# Patient Record
Sex: FEMALE | Race: WHITE | Employment: OTHER | URBAN - NONMETROPOLITAN AREA
[De-identification: names, ages, dates, MRNs, and addresses within clinical notes are randomized per-mention and may not be internally consistent; named-entity substitution may affect disease eponyms.]

---

## 2018-03-09 ENCOUNTER — HOSPITAL ENCOUNTER (OUTPATIENT)
Dept: WOMENS IMAGING | Age: 68
Discharge: HOME | End: 2018-03-09
Payer: COMMERCIAL

## 2018-03-09 DIAGNOSIS — Z12.31 VISIT FOR SCREENING MAMMOGRAM: ICD-10-CM

## 2018-03-09 PROCEDURE — 77063 BREAST TOMOSYNTHESIS BI: CPT

## 2018-05-10 ENCOUNTER — OFFICE VISIT (OUTPATIENT)
Dept: SURGERY | Age: 68
End: 2018-05-10
Payer: COMMERCIAL

## 2018-05-10 VITALS
SYSTOLIC BLOOD PRESSURE: 110 MMHG | RESPIRATION RATE: 20 BRPM | OXYGEN SATURATION: 97 % | HEIGHT: 64 IN | BODY MASS INDEX: 34.62 KG/M2 | TEMPERATURE: 97.5 F | HEART RATE: 64 BPM | DIASTOLIC BLOOD PRESSURE: 68 MMHG | WEIGHT: 202.8 LBS

## 2018-05-10 DIAGNOSIS — I10 ESSENTIAL HYPERTENSION: ICD-10-CM

## 2018-05-10 DIAGNOSIS — K80.50 BILIARY COLIC: Primary | ICD-10-CM

## 2018-05-10 PROCEDURE — 99204 OFFICE O/P NEW MOD 45 MIN: CPT | Performed by: SURGERY

## 2018-05-10 RX ORDER — OMEPRAZOLE 20 MG/1
20 CAPSULE, DELAYED RELEASE ORAL DAILY
COMMUNITY
End: 2019-04-15

## 2018-05-10 RX ORDER — ATORVASTATIN CALCIUM 10 MG/1
10 TABLET, FILM COATED ORAL DAILY
COMMUNITY

## 2018-05-11 ASSESSMENT — ENCOUNTER SYMPTOMS
EYE PAIN: 0
DIARRHEA: 1
APNEA: 0
RHINORRHEA: 1
NAUSEA: 1
ABDOMINAL PAIN: 1
BACK PAIN: 1
PHOTOPHOBIA: 0
COUGH: 1
CHOKING: 0

## 2018-05-14 ENCOUNTER — HOSPITAL ENCOUNTER (OUTPATIENT)
Dept: NUCLEAR MEDICINE | Age: 68
Discharge: HOME | End: 2018-05-14
Payer: COMMERCIAL

## 2018-05-14 VITALS — BODY MASS INDEX: 35.22 KG/M2 | WEIGHT: 202 LBS

## 2018-05-14 DIAGNOSIS — K80.50 BILIARY COLIC: ICD-10-CM

## 2018-05-14 PROCEDURE — A9537 TC99M MEBROFENIN: HCPCS | Performed by: SURGERY

## 2018-05-14 PROCEDURE — 6360000004 HC RX CONTRAST MEDICATION: Performed by: RADIOLOGY

## 2018-05-14 PROCEDURE — 78227 HEPATOBIL SYST IMAGE W/DRUG: CPT

## 2018-05-14 PROCEDURE — 3430000000 HC RX DIAGNOSTIC RADIOPHARMACEUTICAL: Performed by: SURGERY

## 2018-05-14 PROCEDURE — 2580000003 HC RX 258: Performed by: RADIOLOGY

## 2018-05-14 RX ADMIN — SODIUM CHLORIDE 1.83 MCG: 9 INJECTION, SOLUTION INTRAVENOUS at 10:47

## 2018-05-14 RX ADMIN — MEBROFENIN 8.6 MILLICURIE: 45 INJECTION, POWDER, LYOPHILIZED, FOR SOLUTION INTRAVENOUS at 09:42

## 2018-05-16 ASSESSMENT — ENCOUNTER SYMPTOMS
PHOTOPHOBIA: 0
DIARRHEA: 1
BACK PAIN: 1
APNEA: 0
CHOKING: 0
ABDOMINAL PAIN: 1
EYE PAIN: 0
NAUSEA: 1

## 2018-05-17 ENCOUNTER — OFFICE VISIT (OUTPATIENT)
Dept: SURGERY | Age: 68
End: 2018-05-17
Payer: COMMERCIAL

## 2018-05-17 VITALS
HEART RATE: 58 BPM | DIASTOLIC BLOOD PRESSURE: 76 MMHG | SYSTOLIC BLOOD PRESSURE: 120 MMHG | BODY MASS INDEX: 34.15 KG/M2 | HEIGHT: 64 IN | OXYGEN SATURATION: 97 % | WEIGHT: 200 LBS | TEMPERATURE: 97.2 F | RESPIRATION RATE: 18 BRPM

## 2018-05-17 DIAGNOSIS — E66.9 OBESITY (BMI 30-39.9): ICD-10-CM

## 2018-05-17 DIAGNOSIS — R74.8 ABNORMAL SERUM LEVEL OF LIPASE: ICD-10-CM

## 2018-05-17 DIAGNOSIS — R10.13 EPIGASTRIC PAIN: Primary | ICD-10-CM

## 2018-05-17 DIAGNOSIS — M79.7 FIBROMYALGIA: ICD-10-CM

## 2018-05-17 DIAGNOSIS — I10 ESSENTIAL HYPERTENSION: ICD-10-CM

## 2018-05-17 PROCEDURE — 99214 OFFICE O/P EST MOD 30 MIN: CPT | Performed by: SURGERY

## 2018-05-18 ASSESSMENT — ENCOUNTER SYMPTOMS
RHINORRHEA: 0
COUGH: 0

## 2018-05-29 ENCOUNTER — HOSPITAL ENCOUNTER (OUTPATIENT)
Dept: CT IMAGING | Age: 68
Discharge: HOME | End: 2018-05-29
Payer: COMMERCIAL

## 2018-05-29 ENCOUNTER — HOSPITAL ENCOUNTER (OUTPATIENT)
Age: 68
Discharge: HOME | End: 2018-05-29
Payer: COMMERCIAL

## 2018-05-29 DIAGNOSIS — R10.13 EPIGASTRIC PAIN: ICD-10-CM

## 2018-05-29 DIAGNOSIS — K80.50 BILIARY COLIC: ICD-10-CM

## 2018-05-29 DIAGNOSIS — I10 ESSENTIAL HYPERTENSION: ICD-10-CM

## 2018-05-29 LAB
ALBUMIN SERPL-MCNC: 4.2 G/DL (ref 3.5–5.1)
ALP BLD-CCNC: 112 U/L (ref 38–126)
ALT SERPL-CCNC: 14 U/L (ref 11–66)
ANION GAP SERPL CALCULATED.3IONS-SCNC: 10 MEQ/L (ref 8–16)
ANISOCYTOSIS: ABNORMAL
AST SERPL-CCNC: 17 U/L (ref 5–40)
BASOPHILS # BLD: 0.4 %
BASOPHILS ABSOLUTE: 0 THOU/MM3 (ref 0–0.1)
BILIRUB SERPL-MCNC: 0.4 MG/DL (ref 0.3–1.2)
BILIRUBIN DIRECT: < 0.2 MG/DL (ref 0–0.3)
BUN BLDV-MCNC: 18 MG/DL (ref 7–22)
CALCIUM SERPL-MCNC: 9.5 MG/DL (ref 8.5–10.5)
CHLORIDE BLD-SCNC: 104 MEQ/L (ref 98–111)
CO2: 30 MEQ/L (ref 23–33)
CREAT SERPL-MCNC: 0.8 MG/DL (ref 0.4–1.2)
EOSINOPHIL # BLD: 2 %
EOSINOPHILS ABSOLUTE: 0.1 THOU/MM3 (ref 0–0.4)
GFR SERPL CREATININE-BSD FRML MDRD: 71 ML/MIN/1.73M2
GLUCOSE BLD-MCNC: 116 MG/DL (ref 70–108)
HCT VFR BLD CALC: 39 % (ref 37–47)
HEMOGLOBIN: 13.2 GM/DL (ref 12–16)
LIPASE: 90.6 U/L (ref 5.6–51.3)
LYMPHOCYTES # BLD: 30.1 %
LYMPHOCYTES ABSOLUTE: 1.6 THOU/MM3 (ref 1–4.8)
MCH RBC QN AUTO: 28.9 PG (ref 27–31)
MCHC RBC AUTO-ENTMCNC: 34 GM/DL (ref 33–37)
MCV RBC AUTO: 85.1 FL (ref 81–99)
MONOCYTES # BLD: 8.5 %
MONOCYTES ABSOLUTE: 0.5 THOU/MM3 (ref 0.4–1.3)
NUCLEATED RED BLOOD CELLS: 0 /100 WBC
PDW BLD-RTO: 14.7 % (ref 11.5–14.5)
PLATELET # BLD: 171 THOU/MM3 (ref 130–400)
PMV BLD AUTO: 9.4 FL (ref 7.4–10.4)
POC CREATININE WHOLE BLOOD: 0.8 MG/DL (ref 0.5–1.2)
POTASSIUM SERPL-SCNC: 4.6 MEQ/L (ref 3.5–5.2)
RBC # BLD: 4.59 MILL/MM3 (ref 4.2–5.4)
SEG NEUTROPHILS: 59 %
SEGMENTED NEUTROPHILS ABSOLUTE COUNT: 3.2 THOU/MM3 (ref 1.8–7.7)
SODIUM BLD-SCNC: 144 MEQ/L (ref 135–145)
TOTAL PROTEIN: 6.9 G/DL (ref 6.1–8)
WBC # BLD: 5.4 THOU/MM3 (ref 4.8–10.8)

## 2018-05-29 PROCEDURE — 36415 COLL VENOUS BLD VENIPUNCTURE: CPT

## 2018-05-29 PROCEDURE — 6360000004 HC RX CONTRAST MEDICATION: Performed by: SURGERY

## 2018-05-29 PROCEDURE — 82565 ASSAY OF CREATININE: CPT

## 2018-05-29 PROCEDURE — 80053 COMPREHEN METABOLIC PANEL: CPT

## 2018-05-29 PROCEDURE — 83690 ASSAY OF LIPASE: CPT

## 2018-05-29 PROCEDURE — 82248 BILIRUBIN DIRECT: CPT

## 2018-05-29 PROCEDURE — 74177 CT ABD & PELVIS W/CONTRAST: CPT

## 2018-05-29 PROCEDURE — 85025 COMPLETE CBC W/AUTO DIFF WBC: CPT

## 2018-05-29 RX ADMIN — IOPAMIDOL 85 ML: 755 INJECTION, SOLUTION INTRAVENOUS at 13:50

## 2018-05-29 RX ADMIN — IOHEXOL 50 ML: 240 INJECTION, SOLUTION INTRATHECAL; INTRAVASCULAR; INTRAVENOUS; ORAL at 13:51

## 2018-05-31 ENCOUNTER — OFFICE VISIT (OUTPATIENT)
Dept: SURGERY | Age: 68
End: 2018-05-31
Payer: COMMERCIAL

## 2018-05-31 VITALS
HEART RATE: 54 BPM | DIASTOLIC BLOOD PRESSURE: 72 MMHG | RESPIRATION RATE: 18 BRPM | WEIGHT: 203.2 LBS | HEIGHT: 63 IN | SYSTOLIC BLOOD PRESSURE: 130 MMHG | OXYGEN SATURATION: 96 % | BODY MASS INDEX: 36 KG/M2 | TEMPERATURE: 98.3 F

## 2018-05-31 DIAGNOSIS — R74.8 ABNORMAL SERUM LEVEL OF LIPASE: ICD-10-CM

## 2018-05-31 DIAGNOSIS — R10.13 EPIGASTRIC PAIN: Primary | ICD-10-CM

## 2018-05-31 DIAGNOSIS — M79.7 FIBROMYALGIA: ICD-10-CM

## 2018-05-31 DIAGNOSIS — E66.9 OBESITY (BMI 30-39.9): ICD-10-CM

## 2018-05-31 DIAGNOSIS — I10 ESSENTIAL HYPERTENSION: ICD-10-CM

## 2018-05-31 PROCEDURE — 99213 OFFICE O/P EST LOW 20 MIN: CPT | Performed by: SURGERY

## 2018-05-31 ASSESSMENT — ENCOUNTER SYMPTOMS
CHOKING: 0
APNEA: 0
EYE PAIN: 0
NAUSEA: 1
BACK PAIN: 1
RHINORRHEA: 0
DIARRHEA: 1
COUGH: 0
PHOTOPHOBIA: 0
ABDOMINAL PAIN: 1

## 2018-06-01 ASSESSMENT — ENCOUNTER SYMPTOMS: ABDOMINAL DISTENTION: 0

## 2018-07-31 ENCOUNTER — HOSPITAL ENCOUNTER (EMERGENCY)
Age: 68
Discharge: HOME | End: 2018-07-31
Payer: COMMERCIAL

## 2018-07-31 VITALS
HEIGHT: 64 IN | WEIGHT: 202.4 LBS | HEART RATE: 58 BPM | OXYGEN SATURATION: 98 % | DIASTOLIC BLOOD PRESSURE: 72 MMHG | TEMPERATURE: 98.7 F | RESPIRATION RATE: 18 BRPM | SYSTOLIC BLOOD PRESSURE: 157 MMHG | BODY MASS INDEX: 34.56 KG/M2

## 2018-07-31 DIAGNOSIS — L25.5 CONTACT DERMATITIS DUE TO PLANTS, EXCEPT FOOD, UNSPECIFIED CONTACT DERMATITIS TYPE: Primary | ICD-10-CM

## 2018-07-31 PROCEDURE — 99213 OFFICE O/P EST LOW 20 MIN: CPT | Performed by: NURSE PRACTITIONER

## 2018-07-31 PROCEDURE — 99213 OFFICE O/P EST LOW 20 MIN: CPT

## 2018-07-31 RX ORDER — DIAPER,BRIEF,INFANT-TODD,DISP
EACH MISCELLANEOUS 2 TIMES DAILY
COMMUNITY
End: 2019-07-18

## 2018-07-31 RX ORDER — PREDNISONE 10 MG/1
TABLET ORAL
Qty: 30 TABLET | Refills: 0 | Status: SHIPPED | OUTPATIENT
Start: 2018-07-31 | End: 2018-12-10 | Stop reason: ALTCHOICE

## 2018-07-31 ASSESSMENT — ENCOUNTER SYMPTOMS
SHORTNESS OF BREATH: 0
VOMITING: 0
DIARRHEA: 0
ABDOMINAL PAIN: 0
NAUSEA: 0
CHEST TIGHTNESS: 0

## 2018-07-31 NOTE — ED PROVIDER NOTES
[facility]  Urgent Care Encounter      CHIEF COMPLAINT       Chief Complaint   Patient presents with    Rash     bilateral arms after working out in the yard       Nurses Notes reviewed and I agree except as noted in the HPI. HISTORY OF PRESENT ILLNESS   Chino Guerra is a 69 y.o. female who presents for evaluation of a rash that she noticed Thursday to her bilateral arms after working out in the yard. She thinks that it is poison ivy. She has been using Cortisone 10 to the areas with no relief. REVIEW OF SYSTEMS     Review of Systems   Constitutional: Negative. Respiratory: Negative for chest tightness and shortness of breath. Cardiovascular: Negative for chest pain. Gastrointestinal: Negative for abdominal pain, diarrhea, nausea and vomiting. Skin: Positive for rash. Allergic/Immunologic: Negative for environmental allergies and food allergies. Neurological: Negative for headaches. PAST MEDICAL HISTORY         Diagnosis Date    Arthritis     Asthma     seasonal    Depression     Fibromyalgia     GERD (gastroesophageal reflux disease)     Hx of blood clots 2003    DVT after arthroscopy right knee    Hyperlipidemia     Hypertension     Hypothyroidism     Restless legs syndrome     Type II or unspecified type diabetes mellitus without mention of complication, not stated as uncontrolled     Unspecified sleep apnea     no CPAP       SURGICAL HISTORY     Patient  has a past surgical history that includes sinus surgery (1994); Tonsillectomy (1955); Hysterectomy (1992); Knee arthroscopy (Right, 2003); Vena Cava Filter Placement (2003); Cardiac catheterization (2006?? ); Breast lumpectomy (Right, 2009? ??); Colonoscopy; joint replacement (Right, 2013); eye surgery (feb. 2016); skin biopsy; Upper gastrointestinal endoscopy (07/25/2017); and Colonoscopy (08/11/2017).     CURRENT MEDICATIONS       Previous Medications    ATENOLOL (TENORMIN) 25 MG TABLET    Take 25 mg by mouth daily. ATORVASTATIN (LIPITOR) 10 MG TABLET    Take 10 mg by mouth daily    GLIMEPIRIDE (AMARYL) 1 MG TABLET    Take 1 mg by mouth every morning (before breakfast). HYDROCORTISONE 1 % CREAM    Apply topically 2 times daily Apply topically 2 times daily. LEVOTHYROXINE (LEVOTHROID) 25 MCG TABLET    Take 25 mcg by mouth Daily    LISINOPRIL (PRINIVIL;ZESTRIL) 10 MG TABLET    Take 10 mg by mouth daily. OMEPRAZOLE (PRILOSEC) 20 MG DELAYED RELEASE CAPSULE    Take 20 mg by mouth Daily    SITAGLIPTAN (JANUVIA) 100 MG TABLET    Take 100 mg by mouth daily. VENLAFAXINE (EFFEXOR XR) 150 MG EXTENDED RELEASE CAPSULE    Take 150 mg by mouth daily       ALLERGIES     Patient is is allergic to cephalosporins and morphine. FAMILY HISTORY     Patient's family history includes Arthritis in her mother; Cancer in her mother; Diabetes in her mother; Heart Disease in her father; High Blood Pressure in her father; Other in her sister; Thyroid Cancer in her sister. SOCIAL HISTORY     Patient  reports that she has never smoked. She has never used smokeless tobacco. She reports that she does not drink alcohol or use drugs. PHYSICAL EXAM     ED TRIAGE VITALS  BP: (!) 157/72, Temp: 98.7 °F (37.1 °C), Pulse: 58, Resp: 18, SpO2: 98 %  Physical Exam   Constitutional: She is oriented to person, place, and time. Vital signs are normal. She appears well-developed and well-nourished. She is cooperative. She does not appear ill. No distress. HENT:   Head: Normocephalic and atraumatic. Right Ear: External ear normal.   Left Ear: External ear normal.   Nose: Nose normal.   Mouth/Throat: Mucous membranes are normal.   Eyes: Conjunctivae are normal.   Neck: Normal range of motion and full passive range of motion without pain. No neck rigidity. Cardiovascular: Normal rate. Pulmonary/Chest: Effort normal. No accessory muscle usage. No respiratory distress.    Neurological: She is alert and oriented to person, place, and [address] [phone]    As needed, If symptoms worsen, GO DIRECTLY TO THE EMERGENCY DEPARTMENT    DISCHARGE MEDICATIONS:  New Prescriptions    PREDNISONE (DELTASONE) 10 MG TABLET    Take 5 tabs daily AM x 2d, take 4 tabs daily AM x 2d, take 3 tabs daily AM x 2d, take 2 tabs daily AM x 2d, take 1 tab daily AM x 2.      Current Discharge Medication List          Adalberto Zuniga, [address], APRN - CNP  07/31/18 9178

## 2018-07-31 NOTE — ED TRIAGE NOTES
Pt to room 2 with her  and c/o a red itchy rash that appeared on her lower left arm on Friday after working in her yard. She states that is seems to be spreading to her right lower arm now and it is keeping her up at night because of the itching.

## 2018-12-10 ENCOUNTER — HOSPITAL ENCOUNTER (EMERGENCY)
Age: 68
Discharge: HOME | End: 2018-12-10
Payer: MEDICARE

## 2018-12-10 VITALS
RESPIRATION RATE: 18 BRPM | WEIGHT: 200 LBS | OXYGEN SATURATION: 97 % | HEIGHT: 63 IN | SYSTOLIC BLOOD PRESSURE: 138 MMHG | TEMPERATURE: 97.3 F | BODY MASS INDEX: 35.44 KG/M2 | HEART RATE: 56 BPM | DIASTOLIC BLOOD PRESSURE: 63 MMHG

## 2018-12-10 DIAGNOSIS — M79.621 PAIN IN RIGHT UPPER ARM: Primary | ICD-10-CM

## 2018-12-10 PROCEDURE — 99212 OFFICE O/P EST SF 10 MIN: CPT

## 2018-12-10 PROCEDURE — 99213 OFFICE O/P EST LOW 20 MIN: CPT | Performed by: NURSE PRACTITIONER

## 2018-12-10 RX ORDER — METHYLPREDNISOLONE 4 MG/1
TABLET ORAL
Qty: 1 KIT | Refills: 0 | Status: SHIPPED | OUTPATIENT
Start: 2018-12-10 | End: 2018-12-16

## 2018-12-10 ASSESSMENT — ENCOUNTER SYMPTOMS
DIARRHEA: 0
CHEST TIGHTNESS: 0
ABDOMINAL PAIN: 0
VOMITING: 0
SHORTNESS OF BREATH: 0
NAUSEA: 0

## 2018-12-10 ASSESSMENT — PAIN DESCRIPTION - PAIN TYPE: TYPE: ACUTE PAIN

## 2018-12-10 ASSESSMENT — PAIN DESCRIPTION - DESCRIPTORS: DESCRIPTORS: ACHING

## 2018-12-10 ASSESSMENT — PAIN DESCRIPTION - LOCATION: LOCATION: ARM

## 2018-12-10 ASSESSMENT — PAIN SCALES - GENERAL: PAINLEVEL_OUTOF10: 9

## 2018-12-10 ASSESSMENT — PAIN DESCRIPTION - FREQUENCY: FREQUENCY: INTERMITTENT

## 2018-12-10 NOTE — ED PROVIDER NOTES
Emilee Nieto [facility] URGENT CARE  [address] Encounter      [address] [facility]       Chief Complaint   Patient presents with    Bursitis     right arm       Nurses Notes reviewed and I agree except as noted in the HPI. HISTORY OF PRESENT ILLNESS   Fernando Chisholm is a 77 y.o. female who presents for evaluation of right upper arm pain for 6 weeks. She describes the pain as a deep muscle ache. She reports that her symptoms began when she started she has been carrying her 15 lb female dog around during this time due to injury inability and inability to walk. She denies any elbow or shoulder pain. She denies any radiation of pain, numbness, tingling. She denies injury or trauma to the site. She is right-hand dominant. She has been applying lidocaine patch left over from her husbands injury. It seems to help a little. REVIEW OF SYSTEMS     Review of Systems   Constitutional: Negative. Respiratory: Negative for chest tightness and shortness of breath. Cardiovascular: Negative for chest pain. Gastrointestinal: Negative for abdominal pain, diarrhea, nausea and vomiting. Musculoskeletal: Positive for myalgias. Skin: Negative for rash. Allergic/Immunologic: Negative for environmental allergies and food allergies. Neurological: Negative for headaches. PAST MEDICAL HISTORY         Diagnosis Date    Arthritis     Asthma     seasonal    Depression     Fibromyalgia     GERD (gastroesophageal reflux disease)     Hx of blood clots 2003    DVT after arthroscopy right knee    Hyperlipidemia     Hypertension     Hypothyroidism     Restless legs syndrome     Type II or unspecified type diabetes mellitus without mention of complication, not stated as uncontrolled     Unspecified sleep apnea     no CPAP       SURGICAL HISTORY     Patient  has a past surgical history that includes sinus surgery (1994); Tonsillectomy (1955); Hysterectomy (1992); Knee arthroscopy (Right, 2003);  Vena Cava Filter Placement (2003); Cardiac catheterization (2006?? ); Breast lumpectomy (Right, 2009? ??); Colonoscopy; joint replacement (Right, 2013); eye surgery (feb. 2016); skin biopsy; Upper gastrointestinal endoscopy (07/25/2017); and Colonoscopy (08/11/2017). CURRENT MEDICATIONS       Discharge Medication List as of 12/10/2018  9:41 AM      CONTINUE these medications which have NOT CHANGED    Details   hydrocortisone 1 % cream Apply topically 2 times daily Apply topically 2 times daily. , Topical, 2 TIMES DAILY, Historical Med      omeprazole (PRILOSEC) 20 MG delayed release capsule Take 20 mg by mouth DailyHistorical Med      atorvastatin (LIPITOR) 10 MG tablet Take 10 mg by mouth dailyHistorical Med      venlafaxine (EFFEXOR XR) 150 MG extended release capsule Take 150 mg by mouth dailyHistorical Med      levothyroxine (LEVOTHROID) 25 MCG tablet Take 25 mcg by mouth DailyHistorical Med      sitaGLIPtan (JANUVIA) 100 MG tablet Take 100 mg by mouth daily. lisinopril (PRINIVIL;ZESTRIL) 10 MG tablet Take 10 mg by mouth daily. atenolol (TENORMIN) 25 MG tablet Take 25 mg by mouth daily. glimepiride (AMARYL) 1 MG tablet Take 1 mg by mouth every morning (before breakfast). ALLERGIES     Patient is is allergic to cephalosporins and morphine. FAMILY HISTORY     Patient'sfamily history includes Arthritis in her mother; Cancer in her mother; Diabetes in her mother; Heart Disease in her father; High Blood Pressure in her father; Other in her sister; Thyroid Cancer in her sister. SOCIAL HISTORY     Patient  reports that she has never smoked. She has never used smokeless tobacco. She reports that she does not drink alcohol or use drugs. PHYSICAL EXAM     ED TRIAGE VITALS  BP: 138/63, Temp: 97.3 °F (36.3 °C), Pulse: 56, Resp: 18, SpO2: 97 %  Physical Exam   Constitutional: She is oriented to person, place, and time. Vital signs are normal. She appears well-developed and well-nourished.  She is Berto Sims, [address] [place] [address]  [phone]    Schedule an appointment as soon as possible for a visit in 3 days  for further evalation, If symptoms worsen, GO DIRECTLY TO [address] [place] [address] Urgent Care  [address] [place] [address] Drive  [phone]    As needed, If symptoms worsen, GO DIRECTLY TO THE EMERGENCY DEPARTMENT    DISCHARGE MEDICATIONS:  Discharge Medication List as of 12/10/2018  9:41 AM      START taking these medications    Details   methylPREDNISolone (MEDROL, AKUA,) 4 MG tablet Take by mouth., Disp-1 kit, R-0Normal           Discharge Medication List as of 12/10/2018  9:41 AM          Antonieta Maya, [address], APRN - CNP  12/10/18 9669

## 2019-03-13 ENCOUNTER — HOSPITAL ENCOUNTER (OUTPATIENT)
Dept: WOMENS IMAGING | Age: 69
Discharge: HOME | End: 2019-03-13
Payer: MEDICARE

## 2019-03-13 DIAGNOSIS — Z12.39 BREAST SCREENING: ICD-10-CM

## 2019-03-13 PROCEDURE — 77063 BREAST TOMOSYNTHESIS BI: CPT

## 2019-04-15 ENCOUNTER — HOSPITAL ENCOUNTER (EMERGENCY)
Age: 69
Discharge: HOME | End: 2019-04-15
Payer: MEDICARE

## 2019-04-15 VITALS
TEMPERATURE: 98.3 F | DIASTOLIC BLOOD PRESSURE: 72 MMHG | BODY MASS INDEX: 35.61 KG/M2 | RESPIRATION RATE: 18 BRPM | SYSTOLIC BLOOD PRESSURE: 160 MMHG | OXYGEN SATURATION: 98 % | HEIGHT: 63 IN | HEART RATE: 52 BPM | WEIGHT: 201 LBS

## 2019-04-15 DIAGNOSIS — J01.10 ACUTE FRONTAL SINUSITIS, RECURRENCE NOT SPECIFIED: Primary | ICD-10-CM

## 2019-04-15 DIAGNOSIS — H61.21 IMPACTED CERUMEN OF RIGHT EAR: ICD-10-CM

## 2019-04-15 PROCEDURE — 99213 OFFICE O/P EST LOW 20 MIN: CPT | Performed by: NURSE PRACTITIONER

## 2019-04-15 PROCEDURE — 99212 OFFICE O/P EST SF 10 MIN: CPT

## 2019-04-15 RX ORDER — PANTOPRAZOLE SODIUM 40 MG/1
40 TABLET, DELAYED RELEASE ORAL DAILY
COMMUNITY
Start: 2019-03-27 | End: 2020-11-10 | Stop reason: SDUPTHER

## 2019-04-15 RX ORDER — AZELASTINE 1 MG/ML
1 SPRAY, METERED NASAL 2 TIMES DAILY
Qty: 1 BOTTLE | Refills: 0 | Status: SHIPPED | OUTPATIENT
Start: 2019-04-15 | End: 2020-11-10

## 2019-04-15 RX ORDER — DEXTROMETHORPHAN HYDROBROMIDE AND PROMETHAZINE HYDROCHLORIDE 15; 6.25 MG/5ML; MG/5ML
5 SYRUP ORAL 4 TIMES DAILY PRN
Qty: 60 ML | Refills: 0 | Status: SHIPPED | OUTPATIENT
Start: 2019-04-15 | End: 2019-04-20

## 2019-04-15 RX ORDER — AMOXICILLIN AND CLAVULANATE POTASSIUM 875; 125 MG/1; MG/1
1 TABLET, FILM COATED ORAL 2 TIMES DAILY
Qty: 10 TABLET | Refills: 0 | Status: SHIPPED | OUTPATIENT
Start: 2019-04-15 | End: 2019-04-20

## 2019-04-15 ASSESSMENT — ENCOUNTER SYMPTOMS
SORE THROAT: 1
ABDOMINAL PAIN: 0
VOMITING: 0
CHEST TIGHTNESS: 0
RHINORRHEA: 1
CHOKING: 0
SINUS PRESSURE: 1
SINUS PAIN: 1
DIARRHEA: 0
NAUSEA: 1
WHEEZING: 0
COUGH: 1
APNEA: 0
SWOLLEN GLANDS: 0
STRIDOR: 0
SHORTNESS OF BREATH: 0
CONSTIPATION: 0

## 2019-04-15 ASSESSMENT — PAIN SCALES - GENERAL: PAINLEVEL_OUTOF10: 1

## 2019-04-15 ASSESSMENT — PAIN DESCRIPTION - PAIN TYPE: TYPE: ACUTE PAIN

## 2019-04-15 ASSESSMENT — PAIN DESCRIPTION - LOCATION: LOCATION: HEAD

## 2019-04-15 NOTE — ED PROVIDER NOTES
[place] [address]  Urgent Care Encounter      CHIEF COMPLAINT       Chief Complaint   Patient presents with    URI    Fatigue       Nurses Notes reviewed and I agree except as noted in the HPI. HISTORY OFPRESENT ILLNESS   Vincenzo Lee is a 77 y.o. The history is provided by the patient. No  was used. URI   Presenting symptoms: cough, fatigue, rhinorrhea and sore throat    Presenting symptoms: no congestion, no ear pain, no facial pain and no fever    Severity:  Severe  Onset quality:  Gradual  Duration:  1 week  Timing:  Constant  Progression:  Worsening  Chronicity:  New  Relieved by:  Nothing  Worsened by:  Certain positions and movement  Ineffective treatments:  OTC medications  Associated symptoms: headaches, sinus pain and sneezing    Associated symptoms: no arthralgias, no myalgias, no neck pain, no swollen glands and no wheezing    Risk factors: diabetes mellitus and sick contacts    Risk factors: not elderly, no chronic cardiac disease, no chronic kidney disease, no chronic respiratory disease, no immunosuppression, no recent illness and no recent travel        REVIEW OF SYSTEMS     Review of Systems   Constitutional: Positive for activity change, appetite change, diaphoresis and fatigue. Negative for chills and fever. HENT: Positive for postnasal drip, rhinorrhea, sinus pressure, sinus pain, sneezing and sore throat. Negative for congestion and ear pain. Respiratory: Positive for cough. Negative for apnea, choking, chest tightness, shortness of breath, wheezing and stridor. Cardiovascular: Negative for chest pain and leg swelling. Gastrointestinal: Positive for nausea. Negative for abdominal pain, constipation, diarrhea and vomiting. Musculoskeletal: Negative for arthralgias, myalgias and neck pain. Neurological: Positive for headaches. Negative for dizziness and light-headedness.        PAST MEDICAL HISTORY         Diagnosis Date    Arthritis     Heart Disease in her father; High Blood Pressure in her father; Other in her sister; Thyroid Cancer in her sister. SOCIAL HISTORY     Patient  reports that she has never smoked. She has never used smokeless tobacco. She reports that she does not drink alcohol or use drugs. PHYSICAL EXAM     ED TRIAGE VITALS  BP: (!) 160/72, Temp: 98.3 °F (36.8 °C), Pulse: 52, Resp: 18, SpO2: 98 %  Physical Exam   Constitutional: She is oriented to person, place, and time. Vital signs are normal. She appears well-developed and well-nourished. She is active and cooperative. Non-toxic appearance. She does not have a sickly appearance. She does not appear ill. No distress. HENT:   Head: Normocephalic and atraumatic. Right Ear: Hearing and external ear normal. A foreign body (dark dry cerumen obstructs TM) is present. Left Ear: Hearing, external ear and ear canal normal. Tympanic membrane is bulging. Nose: Mucosal edema and rhinorrhea present. Right sinus exhibits frontal sinus tenderness. Left sinus exhibits frontal sinus tenderness. Mouth/Throat: Uvula is midline, oropharynx is clear and moist and mucous membranes are normal. No tonsillar exudate. Eyes: Conjunctivae and EOM are normal.   Neck: Normal range of motion. Pulmonary/Chest: Effort normal and breath sounds normal. No stridor. No respiratory distress. She has no wheezes. She has no rales. She exhibits no tenderness. Musculoskeletal: Normal range of motion. Neurological: She is alert and oriented to person, place, and time. Skin: Skin is warm and dry. She is not diaphoretic. Psychiatric: She has a normal mood and affect. Her behavior is normal. Judgment and thought content normal.   Nursing note and vitals reviewed. DIAGNOSTIC RESULTS   Labs:No results found for this visit on 04/15/19.     IMAGING:  No orders to display     URGENT CARE COURSE:     Vitals:    04/15/19 1046   BP: (!) 160/72   Pulse: 52   Resp: 18   Temp: 98.3 °F (36.8 °C) TempSrc: Temporal   SpO2: 98%   Weight: 201 lb (91.2 kg)   Height: 5' 3\" (1.6 m)       Medications - No data to display  PROCEDURES:  None  FINAL IMPRESSION      1. Acute frontal sinusitis, recurrence not specified    2. Impacted cerumen of right ear        DISPOSITION/PLAN   Decision To Discharge    Drink lots of fluids  Take Motrin or Tylenol for headache  Humidification of air  Use nasal spray as directed  Take a nasal decongestant as directed  Monitor for any fever increased and sinus pain or pressure  Follow-up see her primary care provider in 48 hours  Or go to the emergency department for any changes or concerns.       PATIENT REFERRED TO:  Sophia March MD  [place] [address]  [phone]    Schedule an appointment as soon as possible for a visit in 1 week      DISCHARGE MEDICATIONS:  Discharge Medication List as of 4/15/2019 11:01 AM      START taking these medications    Details   amoxicillin-clavulanate (AUGMENTIN) 875-125 MG per tablet Take 1 tablet by mouth 2 times daily for 5 days, Disp-10 tablet, R-0Normal      azelastine (ASTELIN) 0.1 % nasal spray 1 spray by Nasal route 2 times daily for 14 days Use in each nostril as directed, Disp-1 Bottle, R-0Normal      promethazine-dextromethorphan (PROMETHAZINE-DM) 6.25-15 MG/5ML syrup Take 5 mLs by mouth 4 times daily as needed for Cough (may cause drowsiness), Disp-60 mL, R-0Normal           Discharge Medication List as of 4/15/2019 11:01 AM          ANA Carmen CNP, APRN - CNP  04/15/19 1108

## 2019-06-12 ENCOUNTER — HOSPITAL ENCOUNTER (EMERGENCY)
Dept: GENERAL RADIOLOGY | Age: 69
Discharge: HOME | End: 2019-06-12
Payer: MEDICARE

## 2019-06-12 ENCOUNTER — HOSPITAL ENCOUNTER (EMERGENCY)
Age: 69
Discharge: HOME | End: 2019-06-12
Payer: MEDICARE

## 2019-06-12 VITALS
SYSTOLIC BLOOD PRESSURE: 134 MMHG | BODY MASS INDEX: 33.8 KG/M2 | OXYGEN SATURATION: 97 % | HEART RATE: 50 BPM | TEMPERATURE: 97.9 F | DIASTOLIC BLOOD PRESSURE: 64 MMHG | RESPIRATION RATE: 16 BRPM | WEIGHT: 198 LBS | HEIGHT: 64 IN

## 2019-06-12 DIAGNOSIS — R11.0 NAUSEA: ICD-10-CM

## 2019-06-12 DIAGNOSIS — R19.7 ACUTE DIARRHEA: Primary | ICD-10-CM

## 2019-06-12 DIAGNOSIS — R10.13 ABDOMINAL PAIN, EPIGASTRIC: ICD-10-CM

## 2019-06-12 LAB
BASOPHILS # BLD: 0.5 %
BASOPHILS ABSOLUTE: 0 THOU/MM3 (ref 0–0.1)
BUN WHOLE BLOOD, UC: 16 MG/DL (ref 8–26)
CHLORIDE, WHOLE BLOOD: 107 MEQ/L (ref 98–109)
CO2, WHOLE BLOOD: 27 MEQ/L (ref 23–33)
CREATININE WHOLE BLOOD, UC: 0.8 MG/DL (ref 0.5–1.2)
EOSINOPHIL # BLD: 0.7 %
EOSINOPHILS ABSOLUTE: 0 THOU/MM3 (ref 0–0.4)
GFR, ESTIMATED: 75 ML/MIN/1.73M2
GLUCOSE, WHOLE BLOOD: 73 MG/DL (ref 70–108)
HCT VFR BLD CALC: 41.6 % (ref 37–47)
HEMOGLOBIN: 13.9 GM/DL (ref 12–16)
IMMATURE GRANS (ABS): 0.01 THOU/MM3 (ref 0–0.07)
IMMATURE GRANULOCYTES: 0.2 %
LYMPHOCYTES # BLD: 27.6 %
LYMPHOCYTES ABSOLUTE: 1.5 THOU/MM3 (ref 1–4.8)
MCH RBC QN AUTO: 28.4 PG (ref 27–31)
MCHC RBC AUTO-ENTMCNC: 33.4 GM/DL (ref 33–37)
MCV RBC AUTO: 85 FL (ref 81–99)
MONOCYTES # BLD: 9.7 %
MONOCYTES ABSOLUTE: 0.5 THOU/MM3 (ref 0.4–1.3)
NUCLEATED RED BLOOD CELLS: 0 /100 WBC
PDW BLD-RTO: 12.2 % (ref 11.5–14.5)
PLATELET # BLD: 161 THOU/MM3 (ref 130–400)
PMV BLD AUTO: 11.1 FL (ref 7.4–10.4)
POTASSIUM, WHOLE BLOOD: 3.5 MEQ/L (ref 3.5–4.9)
RBC # BLD: 4.88 MILL/MM3 (ref 4.2–5.4)
SEG NEUTROPHILS: 61.3 %
SEGMENTED NEUTROPHILS ABSOLUTE COUNT: 3.4 THOU/MM3 (ref 1.8–7.7)
SODIUM, WHOLE BLOOD: 144 MEQ/L (ref 138–146)
WBC # BLD: 5.6 THOU/MM3 (ref 4.8–10.8)

## 2019-06-12 PROCEDURE — 80051 ELECTROLYTE PANEL: CPT

## 2019-06-12 PROCEDURE — 85025 COMPLETE CBC W/AUTO DIFF WBC: CPT

## 2019-06-12 PROCEDURE — 82947 ASSAY GLUCOSE BLOOD QUANT: CPT

## 2019-06-12 PROCEDURE — 84520 ASSAY OF UREA NITROGEN: CPT

## 2019-06-12 PROCEDURE — 99214 OFFICE O/P EST MOD 30 MIN: CPT

## 2019-06-12 PROCEDURE — 2709999900 HC NON-CHARGEABLE SUPPLY

## 2019-06-12 PROCEDURE — 96374 THER/PROPH/DIAG INJ IV PUSH: CPT

## 2019-06-12 PROCEDURE — 2580000003 HC RX 258: Performed by: NURSE PRACTITIONER

## 2019-06-12 PROCEDURE — 6370000000 HC RX 637 (ALT 250 FOR IP): Performed by: NURSE PRACTITIONER

## 2019-06-12 PROCEDURE — 74018 RADEX ABDOMEN 1 VIEW: CPT

## 2019-06-12 PROCEDURE — 96361 HYDRATE IV INFUSION ADD-ON: CPT

## 2019-06-12 PROCEDURE — 6360000002 HC RX W HCPCS: Performed by: NURSE PRACTITIONER

## 2019-06-12 PROCEDURE — 82565 ASSAY OF CREATININE: CPT

## 2019-06-12 PROCEDURE — 36415 COLL VENOUS BLD VENIPUNCTURE: CPT

## 2019-06-12 PROCEDURE — 99214 OFFICE O/P EST MOD 30 MIN: CPT | Performed by: NURSE PRACTITIONER

## 2019-06-12 RX ORDER — 0.9 % SODIUM CHLORIDE 0.9 %
1000 INTRAVENOUS SOLUTION INTRAVENOUS ONCE
Status: COMPLETED | OUTPATIENT
Start: 2019-06-12 | End: 2019-06-12

## 2019-06-12 RX ORDER — ONDANSETRON 2 MG/ML
4 INJECTION INTRAMUSCULAR; INTRAVENOUS ONCE
Status: COMPLETED | OUTPATIENT
Start: 2019-06-12 | End: 2019-06-12

## 2019-06-12 RX ORDER — ONDANSETRON 4 MG/1
4 TABLET, FILM COATED ORAL EVERY 8 HOURS PRN
Qty: 15 TABLET | Refills: 0 | Status: SHIPPED | OUTPATIENT
Start: 2019-06-12 | End: 2019-07-18

## 2019-06-12 RX ORDER — DICYCLOMINE HYDROCHLORIDE 10 MG/1
20 CAPSULE ORAL ONCE
Status: COMPLETED | OUTPATIENT
Start: 2019-06-12 | End: 2019-06-12

## 2019-06-12 RX ADMIN — DICYCLOMINE HYDROCHLORIDE 20 MG: 10 CAPSULE ORAL at 13:04

## 2019-06-12 RX ADMIN — SODIUM CHLORIDE 1000 ML: 9 INJECTION, SOLUTION INTRAVENOUS at 13:01

## 2019-06-12 RX ADMIN — ONDANSETRON 4 MG: 2 INJECTION INTRAMUSCULAR; INTRAVENOUS at 12:57

## 2019-06-12 RX ADMIN — LIDOCAINE HYDROCHLORIDE: 20 SOLUTION ORAL; TOPICAL at 13:06

## 2019-06-12 ASSESSMENT — ENCOUNTER SYMPTOMS
TROUBLE SWALLOWING: 0
ABDOMINAL PAIN: 1
ANAL BLEEDING: 0
CHEST TIGHTNESS: 0
COUGH: 0
RECTAL PAIN: 0
VOMITING: 0
SORE THROAT: 0
ABDOMINAL DISTENTION: 0
BLOOD IN STOOL: 0
CONSTIPATION: 0
DIARRHEA: 1
VOICE CHANGE: 0
NAUSEA: 0
SHORTNESS OF BREATH: 0

## 2019-06-12 NOTE — ED TRIAGE NOTES
Patient ambulated to room with complaint of diarrhea that started Friday. States in the beginning of symptoms she had mid abdominal pain but not recently now. States she has had several episodes of incontinence of stool.  Also complains of nausea and feeling worn out also states she is having nausea since she has walked back to room 3

## 2019-06-12 NOTE — ED NOTES
Patient states her nausea is improved and pain is improved.  IV infusing with dressing clean, dry and intact     Rubia Weaver RN  06/12/19 8436

## 2019-06-12 NOTE — ED PROVIDER NOTES
[place]  Urgent Care Encounter      Vinh Armenta       Chief Complaint   Patient presents with    Diarrhea     x1       Nurses Notes reviewed and I agree except as noted in the HPI. HISTORY OF PRESENT ILLNESS   Lexi Tomlinson is a 77 y.o. female who presents:  Patient presents to the urgent care for evaluation of epigastric pain. She states it does not radiate and she has had 2 episodes of epigastric pain since her diarrhea started on Friday. She states it is a sharp pain. Nothing makes it better or worse. She denies any fever, chills, vomiting. She did become nauseated while she presented to the urgent care today. She denies any chest pain, dyspnea, dizziness. She did develop a headache recently with this illness. She has had a EGD and a colonoscopy in the past by her GI physician she has also been diagnosed in the past with biliary colic. She has a history of hypertension and diabetes. She states her sugars have been controlled. She has a history of hysterectomy but denies any other abdominal surgeries. She denies drinking alcohol or smoking. He states at first onset on Friday the diarrhea was loose and brown and she had approximately 10 stools. She states each day the diarrhea decreases stating that she only had one episode today. She denies any diaphoresis, blood in the stool, or mucus. She has not had any recent travel, or ill exposures. Denies any suspicion for spoiled foods or recent antibiotic use. She had oatmeal today and states she had a loose stool directly after eating. She has been urinating normal and drinking a lot of water. Denies any urgency, frequency, or dysuria. She took pepto bismol on the first day and it did not help so she stopped. She is on protonix has history of GERD. Denies any dyspepsia. The history is provided by the patient.        REVIEW OF SYSTEMS     Review of Systems   Constitutional: Positive for activity change and fatigue. Negative for appetite change, chills, diaphoresis and fever. HENT: Negative for sore throat, trouble swallowing and voice change. Eyes: Negative for visual disturbance. Respiratory: Negative for cough, chest tightness and shortness of breath. Cardiovascular: Negative for chest pain. Gastrointestinal: Positive for abdominal pain and diarrhea. Negative for abdominal distention, anal bleeding, blood in stool, constipation, nausea, rectal pain and vomiting. Genitourinary: Negative for decreased urine volume, difficulty urinating, dysuria, frequency, urgency and vaginal pain. Musculoskeletal: Negative for arthralgias and myalgias. Skin: Negative for pallor and rash. Neurological: Positive for headaches. Negative for dizziness, syncope, light-headedness and numbness. Hematological: Negative for adenopathy. Does not bruise/bleed easily. Psychiatric/Behavioral: The patient is not nervous/anxious. PAST MEDICAL HISTORY         Diagnosis Date    Arthritis     Asthma     seasonal    Depression     Fibromyalgia     GERD (gastroesophageal reflux disease)     Hx of blood clots 2003    DVT after arthroscopy right knee    Hyperlipidemia     Hypertension     Hypothyroidism     Restless legs syndrome     Type II or unspecified type diabetes mellitus without mention of complication, not stated as uncontrolled     Unspecified sleep apnea     no CPAP       SURGICAL HISTORY     Patient  has a past surgical history that includes sinus surgery (1994); Tonsillectomy (1955); Hysterectomy (1992); Knee arthroscopy (Right, 2003); Vena Cava Filter Placement (2003); Cardiac catheterization (2006?? ); Breast lumpectomy (Right, 2009? ??); Colonoscopy; joint replacement (Right, 2013); eye surgery (feb. 2016); skin biopsy; Upper gastrointestinal endoscopy (07/25/2017); and Colonoscopy (08/11/2017).     CURRENT MEDICATIONS       Previous Medications    ATENOLOL (TENORMIN) 25 MG TABLET    Take 25 mg by heard.  Pulmonary/Chest: Effort normal and breath sounds normal. No tachypnea. No respiratory distress. Abdominal: Soft. Normal appearance and bowel sounds are normal. She exhibits no distension and no mass. There is no hepatosplenomegaly. There is tenderness in the epigastric area. There is no rigidity, no rebound, no guarding, no CVA tenderness and negative Munoz's sign. No hernia. Musculoskeletal: Normal range of motion. Right knee: She exhibits normal range of motion. Left knee: She exhibits normal range of motion. Lymphadenopathy:     She has no cervical adenopathy. Neurological: She is alert and oriented to person, place, and time. No sensory deficit. GCS eye subscore is 4. GCS verbal subscore is 5. GCS motor subscore is 6. No neuro focal deficits   Skin: Skin is warm, dry and intact. No rash noted. She is not diaphoretic. No cyanosis or erythema. No pallor. No obvious signs of infection or trauma. Psychiatric: She has a normal mood and affect. Her behavior is normal.   Nursing note and vitals reviewed.       DIAGNOSTIC RESULTS   Labs:  Results for orders placed or performed during the hospital encounter of 06/12/19   POC Basic Metabol Panel without Calcium   Result Value Ref Range    Sodium, Whole Blood 144 138 - 146 meq/l    Potassium, Whole Blood 3.5 3.5 - 4.9 meq/l    Chloride, Whole Blood 107 98 - 109 meq/l    CO2, WHOLE BLOOD 27 23 - 33 meq/l    Glucose, Whole Blood 73 70 - 108 mg/dl    BUN WHOLE BLOOD, UC 16 8 - 26 mg/dl    CREATININE WHOLE BLOOD, UC 0.8 0.5 - 1.2 mg/dl   Glomerular Filtration Rate, Estimated   Result Value Ref Range    GFR, Estimated 75 ml/min/1.73m2       IMAGING:    URGENT CARE COURSE:     Vitals:    06/12/19 1155 06/12/19 1403   BP: (!) 140/67 134/64   Pulse: 52 50   Resp: 16 16   Temp: 97.9 °F (36.6 °C)    TempSrc: Oral    SpO2: 97% 97%   Weight: 198 lb (89.8 kg)    Height: 5' 4\" (1.626 m)        Medications   0.9 % sodium chloride bolus (0 mLs

## 2019-06-14 ENCOUNTER — HOSPITAL ENCOUNTER (OUTPATIENT)
Age: 69
Setting detail: SPECIMEN
Discharge: HOME | End: 2019-06-14
Payer: MEDICARE

## 2019-06-14 ENCOUNTER — HOSPITAL ENCOUNTER (EMERGENCY)
Age: 69
Discharge: HOME | End: 2019-06-14
Payer: MEDICARE

## 2019-06-14 VITALS
DIASTOLIC BLOOD PRESSURE: 76 MMHG | TEMPERATURE: 98.2 F | OXYGEN SATURATION: 99 % | HEIGHT: 64 IN | RESPIRATION RATE: 13 BRPM | WEIGHT: 196 LBS | HEART RATE: 52 BPM | BODY MASS INDEX: 33.46 KG/M2 | SYSTOLIC BLOOD PRESSURE: 142 MMHG

## 2019-06-14 DIAGNOSIS — R19.7 DIARRHEA OF PRESUMED INFECTIOUS ORIGIN: Primary | ICD-10-CM

## 2019-06-14 LAB
ALBUMIN SERPL-MCNC: 3.8 G/DL (ref 3.5–5.1)
ALP BLD-CCNC: 93 U/L (ref 38–126)
ALT SERPL-CCNC: 14 U/L (ref 11–66)
ANION GAP SERPL CALCULATED.3IONS-SCNC: 14 MEQ/L (ref 8–16)
AST SERPL-CCNC: 19 U/L (ref 5–40)
BASOPHILS # BLD: 0.4 %
BASOPHILS ABSOLUTE: 0 THOU/MM3 (ref 0–0.1)
BILIRUB SERPL-MCNC: 0.4 MG/DL (ref 0.3–1.2)
BUN BLDV-MCNC: 11 MG/DL (ref 7–22)
CALCIUM SERPL-MCNC: 9.1 MG/DL (ref 8.5–10.5)
CHLORIDE BLD-SCNC: 107 MEQ/L (ref 98–111)
CO2: 21 MEQ/L (ref 23–33)
CREAT SERPL-MCNC: 0.7 MG/DL (ref 0.4–1.2)
EOSINOPHIL # BLD: 0.6 %
EOSINOPHILS ABSOLUTE: 0 THOU/MM3 (ref 0–0.4)
ERYTHROCYTE [DISTWIDTH] IN BLOOD BY AUTOMATED COUNT: 13.5 % (ref 11.5–14.5)
ERYTHROCYTE [DISTWIDTH] IN BLOOD BY AUTOMATED COUNT: 43.8 FL (ref 35–45)
GFR SERPL CREATININE-BSD FRML MDRD: 83 ML/MIN/1.73M2
GLUCOSE BLD-MCNC: 116 MG/DL (ref 70–108)
HCT VFR BLD CALC: 40.3 % (ref 37–47)
HEMOGLOBIN: 12.8 GM/DL (ref 12–16)
IMMATURE GRANS (ABS): 0.01 THOU/MM3 (ref 0–0.07)
IMMATURE GRANULOCYTES: 0.2 %
LYMPHOCYTES # BLD: 28.1 %
LYMPHOCYTES ABSOLUTE: 1.3 THOU/MM3 (ref 1–4.8)
MCH RBC QN AUTO: 28.3 PG (ref 26–33)
MCHC RBC AUTO-ENTMCNC: 31.8 GM/DL (ref 32.2–35.5)
MCV RBC AUTO: 89 FL (ref 81–99)
MONOCYTES # BLD: 10.3 %
MONOCYTES ABSOLUTE: 0.5 THOU/MM3 (ref 0.4–1.3)
NUCLEATED RED BLOOD CELLS: 0 /100 WBC
OSMOLALITY CALCULATION: 283.5 MOSMOL/KG (ref 275–300)
PLATELET # BLD: 146 THOU/MM3 (ref 130–400)
PMV BLD AUTO: 10.3 FL (ref 9.4–12.4)
POTASSIUM SERPL-SCNC: 3.9 MEQ/L (ref 3.5–5.2)
RBC # BLD: 4.53 MILL/MM3 (ref 4.2–5.4)
SEG NEUTROPHILS: 60.4 %
SEGMENTED NEUTROPHILS ABSOLUTE COUNT: 2.8 THOU/MM3 (ref 1.8–7.7)
SODIUM BLD-SCNC: 142 MEQ/L (ref 135–145)
TOTAL PROTEIN: 6.9 G/DL (ref 6.1–8)
WBC # BLD: 4.7 THOU/MM3 (ref 4.8–10.8)

## 2019-06-14 PROCEDURE — 87449 NOS EACH ORGANISM AG IA: CPT

## 2019-06-14 PROCEDURE — 80053 COMPREHEN METABOLIC PANEL: CPT

## 2019-06-14 PROCEDURE — 2709999900 HC NON-CHARGEABLE SUPPLY

## 2019-06-14 PROCEDURE — 2580000003 HC RX 258: Performed by: PHYSICIAN ASSISTANT

## 2019-06-14 PROCEDURE — 36415 COLL VENOUS BLD VENIPUNCTURE: CPT

## 2019-06-14 PROCEDURE — 87507 IADNA-DNA/RNA PROBE TQ 12-25: CPT

## 2019-06-14 PROCEDURE — 99284 EMERGENCY DEPT VISIT MOD MDM: CPT

## 2019-06-14 PROCEDURE — 85025 COMPLETE CBC W/AUTO DIFF WBC: CPT

## 2019-06-14 RX ORDER — DIPHENOXYLATE HCL/ATROPINE 2.5-.025/5
10 LIQUID (ML) ORAL 4 TIMES DAILY PRN
Qty: 120 ML | Refills: 0 | Status: SHIPPED | OUTPATIENT
Start: 2019-06-14 | End: 2019-07-14

## 2019-06-14 RX ORDER — 0.9 % SODIUM CHLORIDE 0.9 %
1000 INTRAVENOUS SOLUTION INTRAVENOUS ONCE
Status: COMPLETED | OUTPATIENT
Start: 2019-06-14 | End: 2019-06-14

## 2019-06-14 RX ADMIN — SODIUM CHLORIDE 1000 ML: 9 INJECTION, SOLUTION INTRAVENOUS at 14:00

## 2019-06-14 ASSESSMENT — ENCOUNTER SYMPTOMS
EYE DISCHARGE: 0
BLOOD IN STOOL: 0
NAUSEA: 0
WHEEZING: 0
EYE PAIN: 0
RHINORRHEA: 0
DIARRHEA: 1
BACK PAIN: 0
ABDOMINAL PAIN: 1
SORE THROAT: 0
VOMITING: 0
SHORTNESS OF BREATH: 0
COUGH: 0

## 2019-06-14 NOTE — ED PROVIDER NOTES
[facility] Emergency Department      CHIEF COMPLAINT       Chief Complaint   Patient presents with    Diarrhea       Nurses Notes reviewed and I agree except as noted in the HPI. HISTORY OF PRESENT ILLNESS    Dimitris Fernandez is a 77 y.o. female who presents to the ED with a primary complain of diarrhea. Patient has experienced this diarrhea for the past 7 days. She denies any blood in the stool. She reports 5 to 6 episodes per day. Patient was evaluated at urgent care on 06/12. She complained of intermittent abdominal pain at that time as well. Lab work and an xray were performed with no acute findings. Patient was treated with Zofran and diagnosed with viral gastroenteritis. Her symptoms have continued, prompting her visit here today. She denies nausea or vomiting. She reports decreased appetite, but has been able to keep foods/liquids down. She had this intermittent stabbing pain to the RUQ X2. She denies any pain at this time. She denies fever, chills, or recent sick contacts. She denies pain/burning with urination. Patient recently started a new Nutri system diet. Before this, she was eating mostly fast foods, specifically [employer] and [employer]. Patient has a mild headache and occasional episodes of lightheadedness. She denies vision change. She denies chest pain or shortness of breath. The patient has been diagnosed with GERD and biliary colic in the past. She has a history of abdominal surgery including a hysterectomy. Patient follows with Dr. Jimena Alva for GI. She has other history including hypertension, hyperlipidemia, diabetes, asthma, and fibromyalgia. Patient does not appear to be in any distress. There are no other complaints or symptoms. REVIEW OF SYSTEMS     Review of Systems   Constitutional: Positive for appetite change. Negative for chills, fatigue and fever. HENT: Negative for congestion, ear pain, rhinorrhea and sore throat.     Eyes: Negative for pain, discharge and visual disturbance. Respiratory: Negative for cough, shortness of breath and wheezing. Cardiovascular: Negative for chest pain, palpitations and leg swelling. Gastrointestinal: Positive for abdominal pain (X2-resolved) and diarrhea. Negative for blood in stool, nausea and vomiting. Genitourinary: Negative for difficulty urinating, dysuria, hematuria and vaginal discharge. Musculoskeletal: Negative for arthralgias, back pain, joint swelling and neck pain. Skin: Negative for pallor and rash. Neurological: Negative for dizziness, syncope, weakness, light-headedness, numbness and headaches. Hematological: Negative for adenopathy. Psychiatric/Behavioral: Negative for confusion and suicidal ideas. The patient is not nervous/anxious. PAST MEDICAL HISTORY    has a past medical history of Arthritis, Asthma, Depression, Fibromyalgia, GERD (gastroesophageal reflux disease), Hx of blood clots, Hyperlipidemia, Hypertension, Hypothyroidism, Restless legs syndrome, Type II or unspecified type diabetes mellitus without mention of complication, not stated as uncontrolled, and Unspecified sleep apnea. SURGICAL HISTORY      has a past surgical history that includes sinus surgery (1994); Tonsillectomy (1955); Hysterectomy (1992); Knee arthroscopy (Right, 2003); Vena Cava Filter Placement (2003); Cardiac catheterization (2006?? ); Breast lumpectomy (Right, 2009? ??); Colonoscopy; joint replacement (Right, 2013); eye surgery (feb. 2016); skin biopsy; Upper gastrointestinal endoscopy (07/25/2017); and Colonoscopy (08/11/2017).     CURRENT MEDICATIONS       Discharge Medication List as of 6/14/2019  3:50 PM      CONTINUE these medications which have NOT CHANGED    Details   ondansetron (ZOFRAN) 4 MG tablet Take 1 tablet by mouth every 8 hours as needed for Nausea or Vomiting, Disp-15 tablet, R-0Normal      pantoprazole (PROTONIX) 40 MG tablet Historical Med      azelastine (ASTELIN) 0.1 % nasal spray 1 spray by Nasal route 2 times daily for 14 days Use in each nostril as directed, Disp-1 Bottle, R-0Normal      hydrocortisone 1 % cream Apply topically 2 times daily Apply topically 2 times daily. , Topical, 2 TIMES DAILY, Historical Med      atorvastatin (LIPITOR) 10 MG tablet Take 10 mg by mouth dailyHistorical Med      venlafaxine (EFFEXOR XR) 150 MG extended release capsule Take 150 mg by mouth dailyHistorical Med      levothyroxine (LEVOTHROID) 25 MCG tablet Take 25 mcg by mouth DailyHistorical Med      sitaGLIPtan (JANUVIA) 100 MG tablet Take 100 mg by mouth daily. lisinopril (PRINIVIL;ZESTRIL) 10 MG tablet Take 10 mg by mouth daily. atenolol (TENORMIN) 25 MG tablet Take 25 mg by mouth daily. glimepiride (AMARYL) 1 MG tablet Take 1 mg by mouth every morning (before breakfast). is allergic to cephalosporins and morphine. FAMILY HISTORY     indicated that her mother is . She indicated that her father is . She indicated that both of her sisters are alive. She indicated that her brother is alive. family history includes Arthritis in her mother; Cancer in her mother; Diabetes in her mother; Heart Disease in her father; High Blood Pressure in her father; Other in her sister; Thyroid Cancer in her sister. SOCIAL HISTORY      reports that she has never smoked. She has never used smokeless tobacco. She reports that she does not drink alcohol or use drugs. PHYSICAL EXAM     INITIAL VITALS:  height is 5' 4\" (1.626 m) and weight is 196 lb (88.9 kg). Her oral temperature is 98.2 °F (36.8 °C). Her blood pressure is 142/76 (abnormal) and her pulse is 52. Her respiration is 13 and oxygen saturation is 99%. Physical Exam   Constitutional: She is oriented to person, place, and time. Vital signs are normal. She appears well-developed and well-nourished. Non-toxic appearance. No distress. HENT:   Head: Normocephalic and atraumatic.    Right Ear: Hearing normal.   Left Ear: Hearing normal.   Nose: Nose normal. No rhinorrhea. Mouth/Throat: Uvula is midline, oropharynx is clear and moist and mucous membranes are normal.   Eyes: Pupils are equal, round, and reactive to light. Conjunctivae and EOM are normal. No scleral icterus. Neck: No JVD present. No neck rigidity. No tracheal deviation present. Cardiovascular: Normal rate, regular rhythm and normal heart sounds. Pulmonary/Chest: Effort normal and breath sounds normal. No respiratory distress. She has no decreased breath sounds. She has no wheezes. Abdominal: Soft. Normal appearance and bowel sounds are normal. She exhibits no distension and no pulsatile midline mass. There is no tenderness. There is no rigidity, no rebound, no guarding, no CVA tenderness, no tenderness at McBurney's point and negative Munoz's sign. No hernia. Musculoskeletal: Normal range of motion. Lymphadenopathy:     She has no cervical adenopathy. Neurological: She is alert and oriented to person, place, and time. She has normal strength. Gait normal.   Skin: Skin is warm, dry and intact. No rash noted. She is not diaphoretic. No pallor. Psychiatric: She has a normal mood and affect. Her speech is normal and behavior is normal. Thought content normal.   Nursing note and vitals reviewed. DIFFERENTIAL DIAGNOSIS:   Including but not limited to Gastroenteritis, Infectious diarrhea, Dehydration    DIAGNOSTIC RESULTS     EKG: All EKG's are interpreted by the Emergency Department Physician who either signs or Co-signs this chart in the absence of a cardiologist.  None     RADIOLOGY: non-plain film images(s) such as CT, Ultrasound andMRI are read by the radiologist.  Plain radiographic images are visualized and preliminarily interpreted by the emergency physician unless otherwise stated below.     No orders to display       LABS:   Labs Reviewed   COMPREHENSIVE METABOLIC PANEL - Abnormal; Notable for the following components:       Result Value    Glucose 116 (*)     CO2 21 (*)     All other components within normal limits   CBC WITH AUTO DIFFERENTIAL - Abnormal; Notable for the following components:    WBC 4.7 (*)     MCHC 31.8 (*)     All other components within normal limits   GLOMERULAR FILTRATION RATE, ESTIMATED - Abnormal; Notable for the following components:    Est, Glom Filt Rate 83 (*)     All other components within normal limits   GASTROINTESTINAL PANEL BY DNA   ANION GAP   OSMOLALITY       EMERGENCY DEPARTMENT COURSE:   Vitals:    Vitals:    06/14/19 1243 06/14/19 1248 06/14/19 1354 06/14/19 1450   BP:  (!) 147/71 (!) 145/73 (!) 142/76   Pulse: 60  50 52   Resp: 12  12 13   Temp: 98.2 °F (36.8 °C)      TempSrc: Oral      SpO2: 99%  98% 99%   Weight: 196 lb (88.9 kg)      Height: 5' 4\" (1.626 m)        1257 Labs ordered    1343 Fluids started    1535 I offered to make the patient an appointment at the [facility] clinic or family [facility] clinic and she declined. The patient presents here with complaints of diarrhea and intermittent abdominal pain for 1 week. Patient was evaluated at urgent care two weeks ago with a negative workup including labs and abdominal xray. Symptoms have not improved, prompting her visit here today. Vital signs are within normal limits. I appreciated a normal physical exam, specifically with no abdominal tenderness. Labs were ordered and patient was given IV fluids in the meantime. Patient agreed to provide a stool sample if possible, but she was unable to do so. Labs are reassuring. I discussed results with the patient and she was offered an appointment at the [facility] clinic or with family [facility], but she declined. On re-exam the patient's abdomen is soft and non-tender, with no peritoneal signs. Vital signs have remained stable. The patient remains non-toxic appearing with no distress evident in the ER. The patient was then discharged home in stable condition with Lomotil.     I have given the patient strict written and verbal instructions about care at home, follow-up, and sign and symptoms of worsening of condition and they did verbalize understanding. CRITICAL CARE:   None    CONSULTS:  None    PROCEDURES:  None    FINALIMPRESSION      1. Diarrhea of presumed infectious origin          DISPOSITION/PLAN   Discharge      PATIENT REFERRED TO:  Miki Sun  Lane Reyesler [address]    Schedule an appointment as soon as possible for a visit       Carissa Easley MD  [address] [facility]  [address]  [phone]    Schedule an appointment as soon as possible for a visit         DISCHARGE MEDICATIONS:  Discharge Medication List as of 6/14/2019  3:50 PM      START taking these medications    Details   diphenoxylate-atropine (LOMOTIL) 2.5-0.025 MG/5ML liquid Take 10 mLs by mouth 4 times daily as needed (diarrhea) for up to 30 days. , Disp-120 mL, R-0Print             (Please note that portions of this note were completed with a voice recognition program.  Efforts were made to edit the dictations but occasionally words are mis-transcribed.)    Scribe:by: Luke Low, 6/14/19 1:41 PM Scribing for and in the presence of Nelsy Wahl PA-C. Provider:  I personally performed the services described in the documentation, reviewed and edited the documentation which was dictated to the scribe in my presence, and it accurately records my words and actions.     Nelsy Wahl PA-C 6/14/19 8:00 PM        PAM [place]  06/14/19 2002

## 2019-06-14 NOTE — ED TRIAGE NOTES
Patient presents to the ED with complaints of diarrhea since 6/7/19. She states she was seen at ambulatory care 6/12/19 and was given instructions to eat the BRAT diet and advance as tolerated. She states she is diabetic but has not been able to check her sugar due to a broken glucometer for \"quite awhile\". She states she has not had an appetite to eat lately and has not eaten anything today due to fear of diarrhea episodes. She states she is afraid to leave the house due to episodes. She has has two bouts today, which was liquidity and a brownish color. She denies a foul odor. VSS. Denies pain at this time.

## 2019-06-15 LAB
ADENOVIRUS F 40 41 PCR: NOT DETECTED
ASTROVIRUS PCR: NOT DETECTED
C DIFF TOXIN/ANTIGEN: NEGATIVE
CAMPYLOBACTER PCR: NOT DETECTED
CLOSTRIDIUM DIFFICILE, PCR: NORMAL
CRYPTOSPORIDIUM PCR: NOT DETECTED
CYCLOSPORA CAYETANENSIS PCR: NOT DETECTED
E COLI 0157 PCR: NORMAL
E COLI ENTEROAGGREGATIVE PCR: NOT DETECTED
E COLI ENTEROPATHOGENIC PCR: NOT DETECTED
E COLI ENTEROTOXIGENIC PCR: NOT DETECTED
E COLI SHIGA LIKE TOXIN PCR: NOT DETECTED
E COLI SHIGELLA/ENTEROINVASIVE PCR: NOT DETECTED
E HISTOLYTICA GI FILM ARRAY: NOT DETECTED
GIARDIA LAMBLIA PCR: NOT DETECTED
NOROVIRUS GI GII PCR: NOT DETECTED
PLESIOMONAS SHIGELLOIDES PCR: NOT DETECTED
ROTAVIRUS A PCR: NOT DETECTED
SALMONELLA PCR: NOT DETECTED
SAPOVIRUS PCR: NOT DETECTED
VIBRIO CHOLERAE PCR: NOT DETECTED
VIBRIO PCR: NOT DETECTED
YERSINIA ENTEROCOLITICA PCR: NOT DETECTED

## 2019-10-07 ENCOUNTER — APPOINTMENT (OUTPATIENT)
Dept: GENERAL RADIOLOGY | Age: 69
End: 2019-10-07
Payer: MEDICARE

## 2019-10-07 ENCOUNTER — HOSPITAL ENCOUNTER (EMERGENCY)
Age: 69
Discharge: HOME | End: 2019-10-07
Attending: EMERGENCY MEDICINE
Payer: MEDICARE

## 2019-10-07 ENCOUNTER — APPOINTMENT (OUTPATIENT)
Dept: ULTRASOUND IMAGING | Age: 69
End: 2019-10-07
Payer: MEDICARE

## 2019-10-07 VITALS
OXYGEN SATURATION: 96 % | RESPIRATION RATE: 16 BRPM | DIASTOLIC BLOOD PRESSURE: 57 MMHG | BODY MASS INDEX: 35.43 KG/M2 | HEART RATE: 56 BPM | SYSTOLIC BLOOD PRESSURE: 127 MMHG | WEIGHT: 200 LBS | TEMPERATURE: 97.3 F

## 2019-10-07 DIAGNOSIS — K80.20 GALLSTONES: ICD-10-CM

## 2019-10-07 DIAGNOSIS — R07.89 ATYPICAL CHEST PAIN: Primary | ICD-10-CM

## 2019-10-07 LAB
ALBUMIN SERPL-MCNC: 3.9 G/DL (ref 3.5–5.1)
ALP BLD-CCNC: 121 U/L (ref 38–126)
ALT SERPL-CCNC: 31 U/L (ref 11–66)
AMORPHOUS: ABNORMAL
ANION GAP SERPL CALCULATED.3IONS-SCNC: 11 MEQ/L (ref 8–16)
AST SERPL-CCNC: 54 U/L (ref 5–40)
BACTERIA: ABNORMAL /HPF
BASOPHILS # BLD: 0.2 %
BASOPHILS ABSOLUTE: 0 THOU/MM3 (ref 0–0.1)
BILIRUB SERPL-MCNC: 0.3 MG/DL (ref 0.3–1.2)
BILIRUBIN DIRECT: < 0.2 MG/DL (ref 0–0.3)
BILIRUBIN URINE: NEGATIVE
BLOOD, URINE: NEGATIVE
BUN BLDV-MCNC: 19 MG/DL (ref 7–22)
CALCIUM SERPL-MCNC: 9.1 MG/DL (ref 8.5–10.5)
CASTS 2: ABNORMAL /LPF
CASTS UA: ABNORMAL /LPF
CHARACTER, URINE: ABNORMAL
CHLORIDE BLD-SCNC: 103 MEQ/L (ref 98–111)
CO2: 28 MEQ/L (ref 23–33)
COLOR: YELLOW
CREAT SERPL-MCNC: 0.8 MG/DL (ref 0.4–1.2)
CRYSTALS, UA: ABNORMAL
EKG ATRIAL RATE: 60 BPM
EKG P AXIS: 56 DEGREES
EKG P-R INTERVAL: 154 MS
EKG Q-T INTERVAL: 448 MS
EKG QRS DURATION: 102 MS
EKG QTC CALCULATION (BAZETT): 448 MS
EKG R AXIS: -4 DEGREES
EKG T AXIS: 42 DEGREES
EKG VENTRICULAR RATE: 60 BPM
EOSINOPHIL # BLD: 1.2 %
EOSINOPHILS ABSOLUTE: 0.1 THOU/MM3 (ref 0–0.4)
EPITHELIAL CELLS, UA: ABNORMAL /HPF
ERYTHROCYTE [DISTWIDTH] IN BLOOD BY AUTOMATED COUNT: 14.4 % (ref 11.5–14.5)
ERYTHROCYTE [DISTWIDTH] IN BLOOD BY AUTOMATED COUNT: 46.3 FL (ref 35–45)
ETHYL ALCOHOL, SERUM: < 0.01 %
GFR SERPL CREATININE-BSD FRML MDRD: 71 ML/MIN/1.73M2
GLUCOSE BLD-MCNC: 164 MG/DL (ref 70–108)
GLUCOSE URINE: NEGATIVE MG/DL
HCT VFR BLD CALC: 40.3 % (ref 37–47)
HEMOGLOBIN: 12.7 GM/DL (ref 12–16)
IMMATURE GRANS (ABS): 0.02 THOU/MM3 (ref 0–0.07)
IMMATURE GRANULOCYTES: 0.3 %
KETONES, URINE: NEGATIVE
LEUKOCYTE ESTERASE, URINE: NEGATIVE
LIPASE: 76.4 U/L (ref 5.6–51.3)
LYMPHOCYTES # BLD: 28.7 %
LYMPHOCYTES ABSOLUTE: 1.6 THOU/MM3 (ref 1–4.8)
MAGNESIUM: 1.9 MG/DL (ref 1.6–2.4)
MCH RBC QN AUTO: 28.2 PG (ref 26–33)
MCHC RBC AUTO-ENTMCNC: 31.5 GM/DL (ref 32.2–35.5)
MCV RBC AUTO: 89.4 FL (ref 81–99)
MISCELLANEOUS 2: ABNORMAL
MONOCYTES # BLD: 8 %
MONOCYTES ABSOLUTE: 0.5 THOU/MM3 (ref 0.4–1.3)
MUCUS: ABNORMAL
NITRITE, URINE: NEGATIVE
NUCLEATED RED BLOOD CELLS: 0 /100 WBC
OSMOLALITY CALCULATION: 289 MOSMOL/KG (ref 275–300)
PH UA: 5.5 (ref 5–9)
PLATELET # BLD: 142 THOU/MM3 (ref 130–400)
PMV BLD AUTO: 9.7 FL (ref 9.4–12.4)
POTASSIUM SERPL-SCNC: 3.9 MEQ/L (ref 3.5–5.2)
PRO-BNP: 76.9 PG/ML (ref 0–900)
PROTEIN UA: NEGATIVE
RBC # BLD: 4.51 MILL/MM3 (ref 4.2–5.4)
RBC URINE: ABNORMAL /HPF
RENAL EPITHELIAL, UA: ABNORMAL
SEG NEUTROPHILS: 61.6 %
SEGMENTED NEUTROPHILS ABSOLUTE COUNT: 3.5 THOU/MM3 (ref 1.8–7.7)
SODIUM BLD-SCNC: 142 MEQ/L (ref 135–145)
SPECIFIC GRAVITY, URINE: 1.02 (ref 1–1.03)
TOTAL PROTEIN: 6.7 G/DL (ref 6.1–8)
TROPONIN T: < 0.01 NG/ML
TROPONIN T: < 0.01 NG/ML
UROBILINOGEN, URINE: 0.2 EU/DL (ref 0–1)
WBC # BLD: 5.7 THOU/MM3 (ref 4.8–10.8)
WBC UA: ABNORMAL /HPF
YEAST: ABNORMAL

## 2019-10-07 PROCEDURE — 83880 ASSAY OF NATRIURETIC PEPTIDE: CPT

## 2019-10-07 PROCEDURE — 71046 X-RAY EXAM CHEST 2 VIEWS: CPT

## 2019-10-07 PROCEDURE — 80053 COMPREHEN METABOLIC PANEL: CPT

## 2019-10-07 PROCEDURE — 82248 BILIRUBIN DIRECT: CPT

## 2019-10-07 PROCEDURE — 36415 COLL VENOUS BLD VENIPUNCTURE: CPT

## 2019-10-07 PROCEDURE — 81001 URINALYSIS AUTO W/SCOPE: CPT

## 2019-10-07 PROCEDURE — 76705 ECHO EXAM OF ABDOMEN: CPT

## 2019-10-07 PROCEDURE — 99284 EMERGENCY DEPT VISIT MOD MDM: CPT

## 2019-10-07 PROCEDURE — 85025 COMPLETE CBC W/AUTO DIFF WBC: CPT

## 2019-10-07 PROCEDURE — G0480 DRUG TEST DEF 1-7 CLASSES: HCPCS

## 2019-10-07 PROCEDURE — 83690 ASSAY OF LIPASE: CPT

## 2019-10-07 PROCEDURE — 83735 ASSAY OF MAGNESIUM: CPT

## 2019-10-07 PROCEDURE — 84484 ASSAY OF TROPONIN QUANT: CPT

## 2019-10-07 PROCEDURE — 93010 ELECTROCARDIOGRAM REPORT: CPT | Performed by: NUCLEAR MEDICINE

## 2019-10-07 PROCEDURE — 6370000000 HC RX 637 (ALT 250 FOR IP): Performed by: EMERGENCY MEDICINE

## 2019-10-07 PROCEDURE — 93005 ELECTROCARDIOGRAM TRACING: CPT | Performed by: EMERGENCY MEDICINE

## 2019-10-07 RX ORDER — ONDANSETRON 4 MG/1
4 TABLET, ORALLY DISINTEGRATING ORAL ONCE
Status: COMPLETED | OUTPATIENT
Start: 2019-10-07 | End: 2019-10-07

## 2019-10-07 RX ORDER — PANTOPRAZOLE SODIUM 40 MG/1
40 TABLET, DELAYED RELEASE ORAL ONCE
Status: COMPLETED | OUTPATIENT
Start: 2019-10-07 | End: 2019-10-07

## 2019-10-07 RX ORDER — SUCRALFATE 1 G/1
1 TABLET ORAL ONCE
Status: COMPLETED | OUTPATIENT
Start: 2019-10-07 | End: 2019-10-07

## 2019-10-07 RX ADMIN — SUCRALFATE 1 G: 1 TABLET ORAL at 05:42

## 2019-10-07 RX ADMIN — PANTOPRAZOLE SODIUM 40 MG: 40 TABLET, DELAYED RELEASE ORAL at 05:42

## 2019-10-07 RX ADMIN — ONDANSETRON 4 MG: 4 TABLET, ORALLY DISINTEGRATING ORAL at 05:43

## 2019-10-07 RX ADMIN — LIDOCAINE HYDROCHLORIDE: 20 SOLUTION ORAL; TOPICAL at 05:41

## 2019-10-07 ASSESSMENT — ENCOUNTER SYMPTOMS
EYE DISCHARGE: 0
DIARRHEA: 0
ABDOMINAL PAIN: 1
SORE THROAT: 0
CHEST TIGHTNESS: 0
PHOTOPHOBIA: 0
CHOKING: 0
RHINORRHEA: 0
CONSTIPATION: 0
COUGH: 0
BACK PAIN: 1
VOICE CHANGE: 0
NAUSEA: 0
EYE PAIN: 0
SINUS PRESSURE: 0
VOMITING: 0
BLOOD IN STOOL: 0
SHORTNESS OF BREATH: 0
EYE ITCHING: 0
WHEEZING: 0
ABDOMINAL DISTENTION: 0
TROUBLE SWALLOWING: 0
EYE REDNESS: 0

## 2019-10-25 ENCOUNTER — HOSPITAL ENCOUNTER (OUTPATIENT)
Dept: PHYSICAL THERAPY | Age: 69
Setting detail: THERAPIES SERIES
Discharge: HOME | End: 2019-10-25
Payer: MEDICARE

## 2019-11-06 ENCOUNTER — HOSPITAL ENCOUNTER (OUTPATIENT)
Dept: PHYSICAL THERAPY | Age: 69
Setting detail: THERAPIES SERIES
Discharge: HOME | End: 2019-11-06
Payer: MEDICARE

## 2019-11-06 PROCEDURE — 97161 PT EVAL LOW COMPLEX 20 MIN: CPT

## 2019-11-06 PROCEDURE — 97035 APP MDLTY 1+ULTRASOUND EA 15: CPT

## 2019-11-06 ASSESSMENT — PAIN DESCRIPTION - DESCRIPTORS: DESCRIPTORS: ACHING;DULL

## 2019-11-06 ASSESSMENT — PAIN DESCRIPTION - LOCATION: LOCATION: KNEE

## 2019-11-06 ASSESSMENT — PAIN DESCRIPTION - PAIN TYPE: TYPE: CHRONIC PAIN

## 2019-11-06 ASSESSMENT — PAIN DESCRIPTION - FREQUENCY: FREQUENCY: INTERMITTENT

## 2019-11-06 ASSESSMENT — PAIN DESCRIPTION - ORIENTATION: ORIENTATION: LEFT;RIGHT

## 2019-11-11 ENCOUNTER — APPOINTMENT (OUTPATIENT)
Dept: PHYSICAL THERAPY | Age: 69
End: 2019-11-11
Payer: MEDICARE

## 2019-11-15 ENCOUNTER — APPOINTMENT (OUTPATIENT)
Dept: PHYSICAL THERAPY | Age: 69
End: 2019-11-15
Payer: MEDICARE

## 2019-11-18 ENCOUNTER — APPOINTMENT (OUTPATIENT)
Dept: PHYSICAL THERAPY | Age: 69
End: 2019-11-18
Payer: MEDICARE

## 2019-11-22 ENCOUNTER — APPOINTMENT (OUTPATIENT)
Dept: PHYSICAL THERAPY | Age: 69
End: 2019-11-22
Payer: MEDICARE

## 2019-11-25 ENCOUNTER — APPOINTMENT (OUTPATIENT)
Dept: PHYSICAL THERAPY | Age: 69
End: 2019-11-25
Payer: MEDICARE

## 2020-01-22 ENCOUNTER — HOSPITAL ENCOUNTER (EMERGENCY)
Age: 70
Discharge: HOME | End: 2020-01-22
Payer: MEDICARE

## 2020-01-22 VITALS
WEIGHT: 196 LBS | HEART RATE: 55 BPM | BODY MASS INDEX: 33.46 KG/M2 | SYSTOLIC BLOOD PRESSURE: 152 MMHG | DIASTOLIC BLOOD PRESSURE: 67 MMHG | TEMPERATURE: 97.4 F | RESPIRATION RATE: 14 BRPM | HEIGHT: 64 IN | OXYGEN SATURATION: 97 %

## 2020-01-22 PROCEDURE — 99213 OFFICE O/P EST LOW 20 MIN: CPT | Performed by: NURSE PRACTITIONER

## 2020-01-22 PROCEDURE — 99212 OFFICE O/P EST SF 10 MIN: CPT

## 2020-01-22 PROCEDURE — 6370000000 HC RX 637 (ALT 250 FOR IP): Performed by: NURSE PRACTITIONER

## 2020-01-22 RX ORDER — AMOXICILLIN 500 MG/1
500 CAPSULE ORAL 2 TIMES DAILY
Qty: 20 CAPSULE | Refills: 0 | Status: SHIPPED | OUTPATIENT
Start: 2020-01-22 | End: 2020-02-01

## 2020-01-22 RX ORDER — MECLIZINE HYDROCHLORIDE 25 MG/1
25 TABLET ORAL 3 TIMES DAILY PRN
Qty: 30 TABLET | Refills: 0 | Status: SHIPPED | OUTPATIENT
Start: 2020-01-22 | End: 2020-02-01

## 2020-01-22 RX ORDER — MECLIZINE HYDROCHLORIDE CHEWABLE TABLETS 25 MG/1
50 TABLET, CHEWABLE ORAL ONCE
Status: COMPLETED | OUTPATIENT
Start: 2020-01-22 | End: 2020-01-22

## 2020-01-22 RX ADMIN — MECLIZINE HCL 50 MG: 25 TABLET, CHEWABLE ORAL at 14:24

## 2020-01-22 ASSESSMENT — ENCOUNTER SYMPTOMS
RHINORRHEA: 0
COUGH: 0
VOMITING: 0
SORE THROAT: 0
TROUBLE SWALLOWING: 0
SHORTNESS OF BREATH: 0
SINUS PRESSURE: 0
VISUAL CHANGE: 0
BLOOD IN STOOL: 0
BACK PAIN: 0
NAUSEA: 0
DIARRHEA: 0

## 2020-01-22 NOTE — ED TRIAGE NOTES
To room 2 c/o dizziness that started today? Pt reports \" I had left ear pain andfew dauys ago.   And Stephany had a cough for about 1 year\"

## 2020-01-22 NOTE — ED PROVIDER NOTES
preauricular, posterior auricular or occipital adenopathy. Cervical: No cervical adenopathy. Right cervical: No superficial, deep or posterior cervical adenopathy. Left cervical: No superficial, deep or posterior cervical adenopathy. Upper Body:      Right upper body: No supraclavicular adenopathy. Left upper body: No supraclavicular adenopathy. Skin:     General: Skin is warm and dry. Capillary Refill: Capillary refill takes less than 2 seconds. Findings: No rash. Neurological:      Mental Status: She is alert and oriented to person, place, and time. Psychiatric:         Mood and Affect: Mood normal.         Behavior: Behavior normal. Behavior is cooperative. DIAGNOSTIC RESULTS     Labs:No results found for this visit on 01/22/20. IMAGING:    No orders to display         EKG:      URGENT CARE COURSE:     Vitals:    01/22/20 1356   BP: (!) 152/67   Pulse: 55   Resp: 14   Temp: 97.4 °F (36.3 °C)   SpO2: 97%   Weight: 196 lb (88.9 kg)   Height: 5' 3.5\" (1.613 m)       Medications   meclizine (ANTIVERT) chewable tablet 50 mg (50 mg Oral Given 1/22/20 1424)            PROCEDURES:  None    FINAL IMPRESSION      1. Non-recurrent acute serous otitis media of left ear    2. Benign paroxysmal positional vertigo, unspecified laterality          DISPOSITION/ PLAN      The parent or patient representative was advised that at this point the patient can be treated safely at home, the parent or Patient representative should be aware of the following symptoms. The patient is to go slow from lying sitting and standing and the patient was also advised to not drive or operate heavy machinery while experiencing vertigo. The patient was also advised that if he develops any headaches, visual changes, slurred speech, dysphagia,weakness of extremities or any other symptoms the patient is to dial 911 or go directly to the emergency department. the patient or patient's representative are agreeable to the management of care at this time. They were advised to follow-up with her primary care provider in 2-3 days if no changes or go to the emergency department for evaluation. The patient/Patient representative are agreeable to the treatment plan and left ambulatory without any problems.       PATIENT REFERRED TO:  MD eDb Pritchett / Kanwal Mckenna [id number]      DISCHARGE MEDICATIONS:  Discharge Medication List as of 1/22/2020  2:26 PM      START taking these medications    Details   amoxicillin (AMOXIL) 500 MG capsule Take 1 capsule by mouth 2 times daily for 10 days, Disp-20 capsule, R-0Normal      meclizine (ANTIVERT) 25 MG tablet Take 1 tablet by mouth 3 times daily as needed for Dizziness, Disp-30 tablet, R-0Normal             Discharge Medication List as of 1/22/2020  2:26 PM          Discharge Medication List as of 1/22/2020  2:26 PM          ANA Garcia CNP    (Please note that portions of this note were completed with a voice recognition program. Efforts were made to edit the dictations but occasionally words are mis-transcribed.)           ANA Garcia CNP  01/22/20 4674

## 2020-10-09 ENCOUNTER — HOSPITAL ENCOUNTER (OUTPATIENT)
Age: 70
Setting detail: SPECIMEN
Discharge: HOME | End: 2020-10-09
Payer: MEDICARE

## 2020-10-09 PROCEDURE — U0003 INFECTIOUS AGENT DETECTION BY NUCLEIC ACID (DNA OR RNA); SEVERE ACUTE RESPIRATORY SYNDROME CORONAVIRUS 2 (SARS-COV-2) (CORONAVIRUS DISEASE [COVID-19]), AMPLIFIED PROBE TECHNIQUE, MAKING USE OF HIGH THROUGHPUT TECHNOLOGIES AS DESCRIBED BY CMS-2020-01-R: HCPCS

## 2020-10-10 LAB — SARS-COV-2: NOT DETECTED

## 2020-10-23 ENCOUNTER — APPOINTMENT (OUTPATIENT)
Dept: GENERAL RADIOLOGY | Age: 70
End: 2020-10-23
Payer: MEDICARE

## 2020-10-23 ENCOUNTER — HOSPITAL ENCOUNTER (EMERGENCY)
Age: 70
Discharge: HOME | End: 2020-10-23
Attending: EMERGENCY MEDICINE
Payer: MEDICARE

## 2020-10-23 VITALS
HEIGHT: 64 IN | BODY MASS INDEX: 33.12 KG/M2 | HEART RATE: 66 BPM | SYSTOLIC BLOOD PRESSURE: 145 MMHG | DIASTOLIC BLOOD PRESSURE: 74 MMHG | WEIGHT: 194 LBS | TEMPERATURE: 98.1 F | OXYGEN SATURATION: 95 % | RESPIRATION RATE: 18 BRPM

## 2020-10-23 PROCEDURE — 6370000000 HC RX 637 (ALT 250 FOR IP): Performed by: STUDENT IN AN ORGANIZED HEALTH CARE EDUCATION/TRAINING PROGRAM

## 2020-10-23 PROCEDURE — 72100 X-RAY EXAM L-S SPINE 2/3 VWS: CPT

## 2020-10-23 PROCEDURE — 99283 EMERGENCY DEPT VISIT LOW MDM: CPT

## 2020-10-23 RX ORDER — ACETAMINOPHEN 500 MG
1000 TABLET ORAL ONCE
Status: COMPLETED | OUTPATIENT
Start: 2020-10-23 | End: 2020-10-23

## 2020-10-23 RX ORDER — LIDOCAINE 4 G/G
1 PATCH TOPICAL ONCE
Status: DISCONTINUED | OUTPATIENT
Start: 2020-10-23 | End: 2020-10-23 | Stop reason: HOSPADM

## 2020-10-23 RX ORDER — IBUPROFEN 200 MG
TABLET ORAL
Status: DISCONTINUED
Start: 2020-10-23 | End: 2020-10-23 | Stop reason: HOSPADM

## 2020-10-23 RX ORDER — IBUPROFEN 200 MG
600 TABLET ORAL ONCE
Status: COMPLETED | OUTPATIENT
Start: 2020-10-23 | End: 2020-10-23

## 2020-10-23 RX ADMIN — IBUPROFEN 600 MG: 200 TABLET, FILM COATED ORAL at 19:12

## 2020-10-23 RX ADMIN — ACETAMINOPHEN 1000 MG: 500 TABLET ORAL at 19:12

## 2020-10-23 ASSESSMENT — ENCOUNTER SYMPTOMS
NAUSEA: 0
DIARRHEA: 0
SHORTNESS OF BREATH: 0
EYE REDNESS: 0
ABDOMINAL PAIN: 0
BACK PAIN: 1
ABDOMINAL DISTENTION: 0
RHINORRHEA: 0
EYE DISCHARGE: 0
COLOR CHANGE: 0
SINUS PAIN: 0
CHEST TIGHTNESS: 0
EYE ITCHING: 0
VOMITING: 0
SINUS PRESSURE: 0

## 2020-10-23 ASSESSMENT — PAIN SCALES - GENERAL
PAINLEVEL_OUTOF10: 10
PAINLEVEL_OUTOF10: 7

## 2020-10-23 ASSESSMENT — PAIN DESCRIPTION - PAIN TYPE: TYPE: ACUTE PAIN

## 2020-10-23 ASSESSMENT — PAIN DESCRIPTION - LOCATION: LOCATION: BACK

## 2020-10-23 ASSESSMENT — PAIN DESCRIPTION - ORIENTATION: ORIENTATION: LOWER

## 2020-10-23 NOTE — ED PROVIDER NOTES
[facility] ENCOUNTER          Pt Name: Chacha Graves  MRN: [id number]  [place] 1950  Date of evaluation: 10/23/2020  Treating Resident Physician: Guille Mahajan DO  Supervising Physician: Evangelist Pugh MD    [address] [place] [address]       Chief Complaint   Patient presents with    Back Pain     lower     History obtained from the patient. HISTORY OF PRESENT ILLNESS    Chacha Graves is a 65 y.o. female who presents to the emergency department for evaluation of back pain. Patient states that she has had chronic back pain on and off for the past several months which she has not seen her PCP for. She states that she has been seeing her chiropractor for her back pain, most recently yesterday, and had an adjustment and reported feeling better. She states that today her back pain became gradually much worse starting in approximately 230 today prior to arrival.  She reports that her back pain is worse with movement, walking, and better with lying flat. She endorses radiation of her back pain down her right buttock and states that it is 10/10 in severity when she is moving and reports only mild pain at rest.  She has been using Aspercreme with mild relief. Denies red flag back pain symptoms. The patient has no other acute complaints at this time. REVIEW OF SYSTEMS   Review of Systems   Constitutional: Negative for fever. HENT: Negative for rhinorrhea, sinus pressure and sinus pain. Eyes: Negative for discharge, redness and itching. Respiratory: Negative for chest tightness and shortness of breath. Cardiovascular: Negative for chest pain. Gastrointestinal: Negative for abdominal distention, abdominal pain, diarrhea, nausea and vomiting. Genitourinary: Negative for difficulty urinating, dysuria, frequency, hematuria and urgency. Musculoskeletal: Positive for back pain. Negative for arthralgias.    Skin: Negative for color change and pallor. Neurological: Negative for dizziness, light-headedness and headaches. PAST MEDICAL AND SURGICAL HISTORY     Past Medical History:   Diagnosis Date    Arthritis     Asthma     seasonal    Depression     Fibromyalgia     GERD (gastroesophageal reflux disease)     Hx of blood clots 2003    DVT after arthroscopy right knee    Hyperlipidemia     Hypertension     Hypothyroidism     Restless legs syndrome     Type II or unspecified type diabetes mellitus without mention of complication, not stated as uncontrolled     Unspecified sleep apnea     no CPAP     Past Surgical History:   Procedure Laterality Date    BREAST LUMPECTOMY Right 2009???    Dr Kendell Go  2006??    results ok    COLONOSCOPY      last one 2014??    COLONOSCOPY  08/11/2017    Dr Sanjay Hancock  feb. 2016    left cataract surgery    HYSTERECTOMY  1992    JOINT REPLACEMENT Right 2013    total knee replacement    KNEE ARTHROSCOPY Right 2003    SINUS SURGERY  1994    SKIN BIOPSY      TONSILLECTOMY  1955    UPPER GASTROINTESTINAL ENDOSCOPY  07/25/2017    Dr Madisyn Gilliam  2003    casandra filter placed         MEDICATIONS     Current Facility-Administered Medications:     lidocaine 4 % external patch 1 patch, 1 patch, Transdermal, Once, López Berry, DO    ibuprofen (ADVIL;MOTRIN) 200 MG tablet, , , ,     Current Outpatient Medications:     Multiple Vitamins-Minerals (EYE VITAMINS PO), Take by mouth daily, Disp: , Rfl:     VITAMIN D PO, Take by mouth daily, Disp: , Rfl:     Ascorbic Acid (VITAMIN C PO), Take by mouth daily, Disp: , Rfl:     MULTIPLE VITAMIN PO, Take by mouth daily, Disp: , Rfl:     Lactobacillus (ACIDOPHILUS PO), Take by mouth daily Indications: 1Billion 3 daily, Disp: , Rfl:     polyethylene glycol (GLYCOLAX) 17 GM/SCOOP powder, Dispense 238 Gram Bottle.   Use as Directed, Disp: 238 g, Rfl: 0    diphenoxylate-atropine (LOMOTIL) 2.5-0.025 symptoms. Plan: Tylenol, ibuprofen, Lidoderm patch. Will pursue imaging of the lumbar spine with lumbar plain films given the patient has not had prior imaging. ED RESULTS   Laboratory results:  Labs Reviewed - No data to display    Radiologic studies results:  XR LUMBAR SPINE (2-3 VIEWS)   Final Result   Degenerative changes. No acute osseous findings. **This report has been created using voice recognition software. It may contain minor errors which are inherent in voice recognition technology. **      Final report electronically signed by Dr. Atilio Ayala on 10/23/2020 7:21 PM          ED Medications administered this visit:   Medications   lidocaine 4 % external patch 1 patch (has no administration in time range)   ibuprofen (ADVIL;MOTRIN) 200 MG tablet (has no administration in time range)   acetaminophen (TYLENOL) tablet 1,000 mg (1,000 mg Oral Given 10/23/20 1912)   ibuprofen (ADVIL;MOTRIN) tablet 600 mg (600 mg Oral Given 10/23/20 1912)         ED COURSE        Strict return precautions and follow up instructions were discussed with the patient prior to discharge, with which the patient agrees. MEDICATION CHANGES     Discharge Medication List as of 10/23/2020  8:24 PM            FINAL DISPOSITION   This is a 75-year-old female who presents to the emergency department for evaluation of back pain without red flag back pain symptoms and a positive straight leg test on the right. Work-up in the emergency department was remarkable for an x-ray of the lumbar spine which showed degenerative changes. The patient was given Tylenol, ibuprofen, and a Lidoderm patch in the emergency department and reported improvement in her pain and with walking and felt comfortable for discharge home.   I discussed with the patient that she could use Tylenol, ibuprofen, and Lidoderm patches over-the-counter as directed on the packaging for her pain and that she should follow-up with her primary care provider as soon as possible regarding her emergency department visit today. I additionally discussed that she should return to the emergency department if she noticed any red flag back pain symptoms. The patient verbalized understanding of the test results, the treatment plan, the return precautions, and the follow-up instructions and all questions were answered at bedside. The patient was subsequently discharged from the emergency department. Final diagnoses:   Acute right-sided low back pain with right-sided sciatica     Condition: condition: good  Dispo: Discharge to home      This transcription was electronically signed. Parts of this transcriptions may have been dictated by use of voice recognition software and electronically transcribed, and parts may have been transcribed with the assistance of an ED scribe. The transcription may contain errors not detected in proofreading. Please refer to my supervising physician's documentation if my documentation differs.     Electronically Signed: Ann-Marie Smith, 10/23/20, 8:33 PM          Ann-Marie Smith DO  Resident  10/23/20 2036

## 2020-10-23 NOTE — ED PROVIDER NOTES
ATTENDING ATTESTATION  Evaluation of Nav Larry. Patient seen and examined by me. Case discussed and care plan developed with resident. I agree with the note and plan as documented by him, except if my documentation differs. I reviewed the medical, surgical, family and social history, medications and allergies. I have reviewed the nursing documentation. I have reviewed the patient's vital signs. Patient c/o low back pain for the last approximately 10 days. Patient denies any recent history of fall or injury. She reports pain has been primarily on the right side of her lower back and occasionally radiating to the right buttock. She went to a chiropractor yesterday and had an adjustment and states that she \"was dancing out of the office I felt so good\". Then later on the afternoon pain began to come back again in her back. Pain is worse with movement and walking and better with rest.  She has been using Aspercreme only for pain relief. She denies fever, saddle anesthesia, bowel or bladder incontinence. She denies any urinary complaints. Physical exam is notable for well appearing, she has point tenderness at the left superior SI joint on my exam.  Straight leg raise is negative bilaterally. Strength is 5 out of 5 in the bilateral lower extremities. DTRs are 2+ bilaterally. MDM: Patient presents with lower back pain, has had no imaging up to this point time. We will obtain x-rays and administer medications in the emergency department. xrays negative except for DJD. Plan: As above. Please see the residents' completed note for final disposition except as documented on this attestation. Diagnosis, treatment and disposition plans were discussed and agreed upon by patient. This transcription was electronically signed. It was dictated by use of voice recognition software and electronically transcribed. The transcription may contain errors not detected in proofreading.      I was present

## 2020-10-23 NOTE — ED TRIAGE NOTES
Pt presents to ER with lower back pain shooting into her buttocks. Pt states she went to the chiropractor and got some relief but the pain came back.

## 2020-11-06 ENCOUNTER — HOSPITAL ENCOUNTER (OUTPATIENT)
Dept: PHYSICAL THERAPY | Age: 70
Setting detail: THERAPIES SERIES
Discharge: HOME | End: 2020-11-06
Payer: MEDICARE

## 2020-11-06 PROCEDURE — 97161 PT EVAL LOW COMPLEX 20 MIN: CPT

## 2020-11-06 NOTE — FLOWSHEET NOTE
** PLEASE SIGN, DATE AND TIME CERTIFICATION BELOW AND RETURN TO [facility] OUTPATIENT REHABILITATION (FAX #: [phone]). ATTEST/CO-SIGN IF ACCESSING VIA [employer]. THANK YOU.**    I certify that I have examined the patient below and determined that Physical Medicine and Rehabilitation service is necessary and that I approve the established plan of care for up to 90 days or as specifically noted. Attestation, signature or co-signature of physician indicates approval of certification requirements.    ________________________ ____________ __________  Physician Signature   Date   Time  [facility]  PHYSICAL THERAPY  [x] EVALUATION  [] DAILY NOTE (LAND) [] DAILY NOTE (AQUATIC ) [] PROGRESS NOTE [] DISCHARGE NOTE    [x] [facility]   [] [place] [address]    [] [facility]   [] Frankell Hand    Date: 2020  Patient Name:  Danial Carl  : 1950  MRN: [id number]    Referring Practitioner Gerard Villagran MD   Diagnosis Radiculopathy, lumbosacral region [M54.17]    Treatment Diagnosis Lumbago, B hip stiffness, R buttock pain, spinal stiffness, muscular weakness, difficulty walking, B knee pain, B knee stiffness   Date of Evaluation 20    Additional Pertinent History Lumbar XR 10/23/20 - Degenerative changes lumbar spine with disc space narrowing. Mild anterolisthesis L4 on L5 by 2 mm. Endplate spurring. SI joints are symmetric. .  R TKA , also having L knee pain, OIO gave cortisone injection       Functional Outcome Measure Used Mod Oswestry LBP   Functional Outcome Score 20/50 = 40% impairment (20)       Insurance: Primary: Payor: Tammy De /  /  / ,   Secondary:    Authorization Information: $40 copay, Auth after EVAL.  Unlimited visits, no massage   Visit # 1, 1/10 for progress note   Visits Allowed: Clive [id number]   Recertification Date:    Physician Follow-Up: None scheduled   Physician Orders: Eval and treat, modalities including therapeutic massage for R lumbar spasm, stiffness, improve ROM   History of Present Illness: Lower back pain on R side, flared up September 2020, sweeping floor with short broom and the pain severely flared up, went to ER and XR showing arthritis. SUBJECTIVE: Patient reports 1/10 back pain, mostly in the evenings elevates to 6/10. Difficulty with standing in static positions, walking is better. Sitting in static positions also aggravates pain. Pain located in lower back and R buttock regions, hurting and tingling. Also c/o knee arthritis bilateral. She has been trying to wear knee sleeve but it did not fit well, has tried using Salonpas patches, takes tylenol for pain. Social/Functional History and Current Status:   Medications and Allergies have been reviewed and are listed on Medical History Questionnaire. Harry Coko lives alone in a multiple floor home with stairs and no handrail to enter. Laundry in basement.      Task Previous Current   ADLs  Independent Independent   IADL's Independent Assistance Required - yardwork and mowing hired help   Ambulation Independent Independent   Transfers Independent Independent   Recreation Independent Modified Independent - wants to return to walking in the neighborhood, family lives in [place] [address]  Active    Work Retired  Retired     OBJECTIVE:    Pain: 1/10 lumbar and R buttock pain   Palpation Painful Midline Lumbar L3-S1, B SI joints, R piriformis/sciatic nerve   Observation    Posture Increased lordosis, Left iliac higher than R in standing  Left LLE shorter than RLE in standing       Range of Motion Standing trunk flexion 0-40 deg tight, cracking lumbar joints  Knee flexion WFL mild limitation R from TKA  Hip flex 0-90 deg B  Piriformis tighter on R  Hamstring 0-65 deg bilateral, tight  Prone knee flexion increased LBP bilaterally   Strength Hip abd 4/5  Hip add 4+/5  Hip flex 4/5 R, 4+/5 L  Bridge weak and limited   Coordination    Sensation WFL   Bed Mobility    Transfers    Ambulation Decreased gait speed, wide TATIANA, decreased B heel strike and anterior trunk lean   Stairs Reciprocal ascending/descending with BUE support, R knee anterior pain   Balance WFL static narrow TATIANA, eyes closed x10 sec, perturbations WFL  Unable to perform standing squat without UE support due to knee pain   Special Tests SLR test negative for provoking radicular buttock tingling         TREATMENT   Precautions:    Pain: Lumbar, R buttock tingling    X in shaded column indicates activity completed today   Modalities Parameters/  Location  Notes   Moist heat lumbar seated after exercises 10 min x                Manual Therapy Time/Technique  Notes                     Exercise/Intervention   Notes   Hooklying piriformis stretch, hip flexor supine off edge 3x 30 sec x bilateral   Longitudinal hip ER/IR rotation 10x  x bilateral   Posterior pelvic tilt 5x 5 sec x                                                              Specific Interventions Next Treatment: Check leg length - left LE appearing shorter than Right. MET could include isometric L hip flexion for correcting posterior iliac rotation. R piriformis inhibition/ manual therapy for sciatica pain in buttock. Piriformis stretching, hip flexor stretching, hamstring stretching, posterior tilt program (marching, quad sets, SAQ, longitudinal hip ER/IR), bridges, clamshells    Activity/Treatment Tolerance:  [x]  Patient tolerated treatment well  []  Patient limited by fatigue  []  Patient limited by pain   []  Patient limited by medical complications  [x]  Other: pain 6/10 at end of assessment, given moist heat    Assessment: Patient presents with acute exacerbation of lower back pain especially R buttock tingling radicular pain.  Her pain limits her tolerance for static positions standing or sitting, difficulty bending and lifting her small dogs and caring for them refilling their water. Patient lives alone and needs to be able to perform all indoor household tasks safely with good body mechanics, currently bending forward a great deal due to chronic knee pain. She would benefit from physical therapy to decrease lumbar pain and improve flexibility and strength of her hips, knees, and trunk in order to perform household tasks and care for her dogs without difficulty. Body Structures/Functions/Activity Limitations: impaired activity tolerance, impaired ROM, impaired strength, pain and abnormal gait  Prognosis: good    GOALS:  Patient Goal: decrease pain, bend down to lift without straining back    Short Term Goals:  Time Frame: 6 weeks    Patient will report decreased lower back and R buttock pain from baseline 6/10 to less than 2/10 during therapy exercises in order to  kitchen for meal preparation without breaks. Patient will improve BLE AROM to 0-80 deg hamstring, 0-120 deg piriformis stretch, 0-15 deg hip extension in order to bend and lift without straining lumbar spine. Patient will demonstrate good abdominal bracing and body mechanics during therapy session in order to preserve neutral spine during household tasks. Patient will be IND with HEP in order to meet long term goals. Long Term Goals:  Time Frame: 12 weeks    Patient will improve score of Modified Oswestry LBP questionnaire from baseline 40% impairment to at least 20% impairment in order to perform household tasks like sweeping, vacuuming and laundry. Patient will squat to lift 10 lb weight from floor to standing with good body mechanics in order to bend to lift her dogs at home and refill their water. Patient will improve BLE strength to 5/5 in order to squat, lunge, and navigate steps without difficulty.        Patient Education:   [x]  HEP/Education Completed: Plan of Care, Goals, HEP handout given for hip flexor stretch, piriformis, longitudinal ER/IR hip, and posterior tilt. Also given body mechanics for squat to lift, log roll, and fall prevention handout  [address] Access Code: [email or web address]  []  No new Education completed  []  Reviewed Prior HEP      [x]  Patient verbalized and/or demonstrated understanding of education provided. []  Patient unable to verbalize and/or demonstrate understanding of education provided. Will continue education. []  Barriers to learning: none    PLAN:  Treatment Recommendations: Strengthening, Range of Motion, Balance Training, Functional Mobility Training, Gait Training, Stair Training, Manual Therapy - Soft Tissue Mobilization, Manual Therapy - Joint Manipulation, Pain Management, Home Exercise Program, Patient Education, Integrative Dry Needling, Aquatics and Modalities    [x]  Plan of care initiated. Plan to see patient 2 times per week for 12 weeks to address the treatment planned outlined above.   []  Continue with current plan of care  []  Modify plan of care as follows:    []  Hold pending physician visit  []  Discharge    Time In 1355   Time Out 1455   Timed Code Minutes: 0 min AUTH   Total Treatment Time: 60 min       Electronically Signed by: Yvan Jimenez

## 2020-11-23 ENCOUNTER — HOSPITAL ENCOUNTER (OUTPATIENT)
Dept: PHYSICAL THERAPY | Age: 70
Setting detail: THERAPIES SERIES
Discharge: HOME | End: 2020-11-23
Payer: MEDICARE

## 2020-11-23 PROCEDURE — 97110 THERAPEUTIC EXERCISES: CPT

## 2020-11-23 NOTE — PROGRESS NOTES
[address] [facility]  PHYSICAL THERAPY  [x] DAILY NOTE (LAND) [] DAILY NOTE (AQUATIC ) [] PROGRESS NOTE [] DISCHARGE NOTE    [x] OUTPATIENT REHABILITATION CENTER [facility]   [] [place] [address]    [] [facility]   [] Darlene Singh    Date: 2020  Patient Name:  Ledy Paz  : 1950  MRN: [id number]    Referring Practitioner Sheron Martino MD   Diagnosis Radiculopathy, lumbosacral region [M54.17]    Treatment Diagnosis Lumbago, B hip stiffness, R buttock pain, spinal stiffness, muscular weakness, difficulty walking, B knee pain, B knee stiffness   Date of Evaluation 20    Additional Pertinent History Lumbar XR 10/23/20 - Degenerative changes lumbar spine with disc space narrowing. Mild anterolisthesis L4 on L5 by 2 mm. Endplate spurring. SI joints are symmetric. .  R TKA , also having L knee pain, OIO gave cortisone injection       Functional Outcome Measure Used Mod Oswestry LBP   Functional Outcome Score 20/50 = 40% impairment (20)       Insurance: Primary: Payor: Claudy Lezama /  /  / ,   Secondary:    Authorization Information: $40 copay, Auth after EVAL. RECEIVED AUTH FOR 6 PT VISITS FROM 20-21  no massage   Visit # 2, 2/10 for progress note   Visits Allowed: Ricarda Toribio   Recertification Date:    Physician Follow-Up: None scheduled   Physician Orders: Eval and treat, modalities including therapeutic massage for R lumbar spasm, stiffness, improve ROM   History of Present Illness: Lower back pain on R side, flared up 2020, sweeping floor with short broom and the pain severely flared up, went to ER and XR showing arthritis. SUBJECTIVE: Patient currently denies having any pain and reporting not doing HEP as much as she should.     TREATMENT   Precautions:    Pain: Denies    X in shaded column indicates activity completed today   Modalities Parameters/  Location  Notes   Moist heat lumbar seated after exercises 10 min Manual Therapy Time/Technique  Notes                     Exercise/Intervention   Notes   Hooklying piriformis stretch, hip flexor supine off edge, HS stretch 90/90 3x 30 sec x bilateral   LTR  3 15 sec x    Longitudinal hip ER/IR rotation 10x  x bilateral   Posterior pelvic tilt 5x 5 sec x    DLSP: Bracing/ Pelvic tilt with all                Shoulder flexion, marching  10  x              Heel slides                Quad sets, SAQ, hip adduction with ball  10 5 sec x    Bridge 1  x Stopped due to it causing pain   Hook lying hip abduction bilateral and unilateral  10 5 x Green band   Seated piriformis stretch and HS stretch 3 30 sec x    LAQ  10 5  x    HS curl and hip abduction bilat and unilat  10 5 x g                          Specific Interventions Next Treatment: Check leg length - left LE appearing shorter than Right. MET could include isometric L hip flexion for correcting posterior iliac rotation. R piriformis inhibition/ manual therapy for sciatica pain in buttock. Piriformis stretching, hip flexor stretching, hamstring stretching, posterior tilt program (marching, quad sets, SAQ, longitudinal hip ER/IR), bridges, clamshells    Activity/Treatment Tolerance:  [x]  Patient tolerated treatment well  []  Patient limited by fatigue  []  Patient limited by pain   []  Patient limited by medical complications  [x]  Other: pain 6/10 at end of assessment, given moist heat    Assessment: Progressed with stretches and strengthening exercises as noted with patient having good tolerance. Patient did get a little bit of low back pain while supine but pain went away upon sitting up. Patient denies having any pain at end of session and reporting no other complains.     GOALS:  Patient Goal: decrease pain, bend down to lift without straining back    Short Term Goals:  Time Frame: 6 weeks    Patient will report decreased lower back and R buttock pain from baseline 6/10 to less than 2/10 during therapy exercises in order to  kitchen for meal preparation without breaks. Patient will improve BLE AROM to 0-80 deg hamstring, 0-120 deg piriformis stretch, 0-15 deg hip extension in order to bend and lift without straining lumbar spine. Patient will demonstrate good abdominal bracing and body mechanics during therapy session in order to preserve neutral spine during household tasks. Patient will be IND with HEP in order to meet long term goals. Long Term Goals:  Time Frame: 12 weeks    Patient will improve score of Modified Oswestry LBP questionnaire from baseline 40% impairment to at least 20% impairment in order to perform household tasks like sweeping, vacuuming and laundry. Patient will squat to lift 10 lb weight from floor to standing with good body mechanics in order to bend to lift her dogs at home and refill their water. Patient will improve BLE strength to 5/5 in order to squat, lunge, and navigate steps without difficulty. Patient Education:   [x]  HEP/Education Completed:     Isidro Stein Access Code: [email or web address]    []  No new Education completed  []  Reviewed Prior HEP      [x]  Patient verbalized and/or demonstrated understanding of education provided. []  Patient unable to verbalize and/or demonstrate understanding of education provided. Will continue education. []  Barriers to learning: none    PLAN:  Treatment Recommendations: Strengthening, Range of Motion, Balance Training, Functional Mobility Training, Gait Training, Stair Training, Manual Therapy - Soft Tissue Mobilization, Manual Therapy - Joint Manipulation, Pain Management, Home Exercise Program, Patient Education, Integrative Dry Needling, Aquatics and Modalities       [x]  Continue with current plan of care. 2 times per week for 12 weeks.   []  Modify plan of care as follows:    []  Hold pending physician visit  []  Discharge    Time In 0945   Time Out 1039   Timed Code Minutes: 50   Total Treatment Time: 54 min

## 2020-11-25 ENCOUNTER — APPOINTMENT (OUTPATIENT)
Dept: PHYSICAL THERAPY | Age: 70
End: 2020-11-25
Payer: MEDICARE

## 2020-12-01 ENCOUNTER — HOSPITAL ENCOUNTER (OUTPATIENT)
Dept: PHYSICAL THERAPY | Age: 70
Setting detail: THERAPIES SERIES
Discharge: HOME | End: 2020-12-01
Payer: MEDICARE

## 2020-12-01 PROCEDURE — 97110 THERAPEUTIC EXERCISES: CPT

## 2020-12-01 NOTE — PROGRESS NOTES
[address] [facility]  PHYSICAL THERAPY  [x] DAILY NOTE (LAND) [] DAILY NOTE (AQUATIC ) [] PROGRESS NOTE [] DISCHARGE NOTE    [x] OUTPATIENT REHABILITATION CENTER [facility]   [] [place] [address]    [] [facility]   [] Adriana Momin    Date: 2020  Patient Name:  Fer Mcleod  : 1950  MRN: [id number]    Referring Practitioner Cely Lay MD   Diagnosis Radiculopathy, lumbosacral region [M54.17]    Treatment Diagnosis Lumbago, B hip stiffness, R buttock pain, spinal stiffness, muscular weakness, difficulty walking, B knee pain, B knee stiffness   Date of Evaluation 20    Additional Pertinent History Lumbar XR 10/23/20 - Degenerative changes lumbar spine with disc space narrowing. Mild anterolisthesis L4 on L5 by 2 mm. Endplate spurring. SI joints are symmetric. .  R TKA , also having L knee pain, OIO gave cortisone injection       Functional Outcome Measure Used Mod Oswestry LBP   Functional Outcome Score 20/50 = 40% impairment (20)       Insurance: Primary: Payor: Dee Aden /  /  / ,   Secondary:    Authorization Information: $40 copay, Auth after EVAL. RECEIVED AUTH FOR 6 PT VISITS FROM 20-21  no massage   Visit # 3, 3/10 for progress note   Visits Allowed: Dayanara Watts   Recertification Date:    Physician Follow-Up: None scheduled   Physician Orders: Eval and treat, modalities including therapeutic massage for R lumbar spasm, stiffness, improve ROM   History of Present Illness: Lower back pain on R side, flared up 2020, sweeping floor with short broom and the pain severely flared up, went to ER and XR showing arthritis. SUBJECTIVE: Patient currently denies having any pain.     TREATMENT   Precautions:    Pain: Denies    X in shaded column indicates activity completed today   Modalities Parameters/  Location  Notes   Moist heat lumbar seated after exercises 10 min                 Manual Therapy Time/Technique  Notes session and reporting no other complains. GOALS:  Patient Goal: decrease pain, bend down to lift without straining back    Short Term Goals:  Time Frame: 6 weeks    Patient will report decreased lower back and R buttock pain from baseline 6/10 to less than 2/10 during therapy exercises in order to  kitchen for meal preparation without breaks. Patient will improve BLE AROM to 0-80 deg hamstring, 0-120 deg piriformis stretch, 0-15 deg hip extension in order to bend and lift without straining lumbar spine. Patient will demonstrate good abdominal bracing and body mechanics during therapy session in order to preserve neutral spine during household tasks. Patient will be IND with HEP in order to meet long term goals. Long Term Goals:  Time Frame: 12 weeks    Patient will improve score of Modified Oswestry LBP questionnaire from baseline 40% impairment to at least 20% impairment in order to perform household tasks like sweeping, vacuuming and laundry. Patient will squat to lift 10 lb weight from floor to standing with good body mechanics in order to bend to lift her dogs at home and refill their water. Patient will improve BLE strength to 5/5 in order to squat, lunge, and navigate steps without difficulty. Patient Education:   [x]  HEP/Education Completed:     [address] Access Code: [email or web address]    []  No new Education completed  []  Reviewed Prior HEP      [x]  Patient verbalized and/or demonstrated understanding of education provided. []  Patient unable to verbalize and/or demonstrate understanding of education provided. Will continue education.   []  Barriers to learning: none    PLAN:  Treatment Recommendations: Strengthening, Range of Motion, Balance Training, Functional Mobility Training, Gait Training, Stair Training, Manual Therapy - Soft Tissue Mobilization, Manual Therapy - Joint Manipulation, Pain Management, Home Exercise Program, Patient Education, Integrative Dry Needling, Aquatics and Modalities       [x]  Continue with current plan of care. 2 times per week for 12 weeks.   []  Modify plan of care as follows:    []  Hold pending physician visit  []  Discharge    Time In [phone]   Time Out 1031   Timed Code Minutes: 44   Total Treatment Time: 44 min       Electronically Signed by: Wei Gary

## 2020-12-03 ENCOUNTER — APPOINTMENT (OUTPATIENT)
Dept: PHYSICAL THERAPY | Age: 70
End: 2020-12-03
Payer: MEDICARE

## 2020-12-08 ENCOUNTER — APPOINTMENT (OUTPATIENT)
Dept: PHYSICAL THERAPY | Age: 70
End: 2020-12-08
Payer: MEDICARE

## 2020-12-10 ENCOUNTER — APPOINTMENT (OUTPATIENT)
Dept: PHYSICAL THERAPY | Age: 70
End: 2020-12-10
Payer: MEDICARE

## 2021-01-06 NOTE — DISCHARGE SUMMARY
[facility]    Patient Name: Charo Whiteside        CSN: [id number]   YOB: 1950  Gender: female  Major Palafox MD,    Radiculopathy, lumbosacral region [M54.17] ,      Patient is discharged from Physical Therapy services at this time. See last note for details related to results of therapy and goal achievement. Date: 1/6/2021    Reason for discharge: patient request. She completed eval 11/6/20 and received auth for 6 visits. Patient completed 2 therapy visits and then on 12/7/20 called and requested to cancel her remaining appointments, too painful.        Mason Holcomb, PT, DPT

## 2021-02-12 ENCOUNTER — APPOINTMENT (OUTPATIENT)
Dept: GENERAL RADIOLOGY | Age: 71
End: 2021-02-12
Payer: MEDICARE

## 2021-02-12 ENCOUNTER — HOSPITAL ENCOUNTER (EMERGENCY)
Age: 71
Discharge: HOME | End: 2021-02-12
Payer: MEDICARE

## 2021-02-12 VITALS
SYSTOLIC BLOOD PRESSURE: 146 MMHG | OXYGEN SATURATION: 98 % | HEART RATE: 56 BPM | RESPIRATION RATE: 16 BRPM | WEIGHT: 200 LBS | TEMPERATURE: 97.3 F | BODY MASS INDEX: 34.33 KG/M2 | DIASTOLIC BLOOD PRESSURE: 80 MMHG

## 2021-02-12 DIAGNOSIS — G54.0 BRACHIAL PLEXUS DYSFUNCTION: Primary | ICD-10-CM

## 2021-02-12 PROCEDURE — 99213 OFFICE O/P EST LOW 20 MIN: CPT | Performed by: NURSE PRACTITIONER

## 2021-02-12 PROCEDURE — 73060 X-RAY EXAM OF HUMERUS: CPT

## 2021-02-12 PROCEDURE — 99213 OFFICE O/P EST LOW 20 MIN: CPT

## 2021-02-12 RX ORDER — ACETAMINOPHEN 325 MG/1
650 TABLET ORAL EVERY 6 HOURS PRN
COMMUNITY

## 2021-02-12 RX ORDER — PREDNISONE 20 MG/1
TABLET ORAL
Qty: 24 TABLET | Refills: 0 | Status: SHIPPED | OUTPATIENT
Start: 2021-02-12 | End: 2021-02-20

## 2021-02-12 ASSESSMENT — ENCOUNTER SYMPTOMS
CHOKING: 0
BACK PAIN: 0
WHEEZING: 0
CHEST TIGHTNESS: 0
APNEA: 0
STRIDOR: 0
SHORTNESS OF BREATH: 0
COUGH: 0

## 2021-02-12 ASSESSMENT — PAIN DESCRIPTION - LOCATION: LOCATION: ARM

## 2021-02-12 ASSESSMENT — PAIN DESCRIPTION - PAIN TYPE: TYPE: ACUTE PAIN

## 2021-02-12 ASSESSMENT — PAIN - FUNCTIONAL ASSESSMENT: PAIN_FUNCTIONAL_ASSESSMENT: PREVENTS OR INTERFERES SOME ACTIVE ACTIVITIES AND ADLS

## 2021-02-12 ASSESSMENT — PAIN DESCRIPTION - DESCRIPTORS: DESCRIPTORS: BURNING

## 2021-02-12 ASSESSMENT — PAIN DESCRIPTION - FREQUENCY: FREQUENCY: INTERMITTENT

## 2021-02-12 NOTE — ED TRIAGE NOTES
Patient states right upper arm pain for 1 1/2 wks. Denies injury. Pain worse with lying on right arm while sleeping, backward motion behind body. Tylenol taken. Noted,Patient has bruised arm from pressing on arm.

## 2021-02-12 NOTE — ED PROVIDER NOTES
[facility]  Urgent Care Encounter      CHIEF COMPLAINT       Chief Complaint   Patient presents with    Arm Pain     right upper arm       Nurses Notes reviewed and I agree except as noted in the HPI. HISTORY OFPRESENT ILLNESS   Ariana Rascon is a 65 y.o. The history is provided by the patient. No  was used. Arm Injury  Location:  Arm (denies injury presents with pain)  Arm location:  R upper arm  Injury: no    Pain details:     Quality:  Sharp and shooting    Radiates to:  R shoulder    Severity:  Severe    Onset quality:  Gradual    Duration:  2 weeks    Timing:  Constant    Progression:  Worsening  Handedness:  Right-handed  Dislocation: no    Foreign body present:  No foreign bodies  Tetanus status:  Unknown  Prior injury to area:  No  Relieved by:  Nothing  Worsened by:  Nothing  Ineffective treatments:  Rest and acetaminophen (topicals)  Associated symptoms: no back pain, no decreased range of motion, no fatigue, no fever, no muscle weakness, no neck pain, no numbness, no stiffness, no swelling and no tingling    Risk factors: no concern for non-accidental trauma, no known bone disorder, no frequent fractures and no recent illness        REVIEW OF SYSTEMS     Review of Systems   Constitutional: Negative for activity change, appetite change, chills, diaphoresis, fatigue and fever. Respiratory: Negative for apnea, cough, choking, chest tightness, shortness of breath, wheezing and stridor. Cardiovascular: Negative for chest pain, palpitations and leg swelling. Musculoskeletal: Positive for myalgias. Negative for arthralgias, back pain, gait problem, joint swelling, neck pain, neck stiffness and stiffness. Skin: Negative for pallor, rash and wound. Neurological: Negative for dizziness, light-headedness and headaches.        PAST MEDICAL HISTORY         Diagnosis Date    Arthritis     Asthma     seasonal    Depression     Fibromyalgia     GERD glimepiride (AMARYL) 1 MG tablet Take 1 mg by mouth every morning (before breakfast). diphenoxylate-atropine (LOMOTIL) 2.5-0.025 MG per tablet Take 1 tablet by mouth 4 times daily as needed for Diarrhea. Historical Med             ALLERGIES     Patient is is allergic to cephalosporins and morphine. FAMILY HISTORY     Patient's family history includes Arthritis in her mother; Cancer in her mother; Diabetes in her mother; Heart Disease in her father; High Blood Pressure in her father; Other in her sister; Thyroid Cancer in her sister. SOCIAL HISTORY     Patient  reports that she has never smoked. She has never used smokeless tobacco. She reports that she does not drink alcohol or use drugs. PHYSICAL EXAM     ED TRIAGE VITALS  BP: (!) 146/80, Temp: 97.3 °F (36.3 °C), Pulse: 56, Resp: 16, SpO2: 98 %  Physical Exam  Vitals signs and nursing note reviewed. Constitutional:       General: She is not in acute distress. Appearance: Normal appearance. She is obese. She is not ill-appearing, toxic-appearing or diaphoretic. HENT:      Head: Normocephalic and atraumatic. Right Ear: External ear normal.      Left Ear: External ear normal.   Eyes:      Extraocular Movements: Extraocular movements intact. Conjunctiva/sclera: Conjunctivae normal.   Neck:      Musculoskeletal: Normal range of motion. Cardiovascular:      Pulses: Normal pulses. Radial pulses are 2+ on the right side. Femoral pulses are 2+ on the right side. Pulmonary:      Effort: Pulmonary effort is normal.   Musculoskeletal:         General: Tenderness present. Right shoulder: She exhibits pain. She exhibits normal range of motion, no tenderness, no bony tenderness, no swelling, no effusion, no crepitus, no deformity, no laceration, no spasm, normal pulse and normal strength. Right elbow: She exhibits normal range of motion, no swelling, no effusion, no deformity and no laceration. No tenderness found.  No radial head, no medial epicondyle, no lateral epicondyle and no olecranon process tenderness noted. Right upper arm: She exhibits tenderness. She exhibits no bony tenderness, no swelling, no edema, no deformity and no laceration. Right forearm: She exhibits no tenderness, no bony tenderness, no swelling, no edema, no deformity and no laceration. Right hand: She exhibits normal range of motion, no tenderness, no bony tenderness, normal two-point discrimination, normal capillary refill, no deformity, no laceration and no swelling. Normal sensation noted. Normal strength noted. Skin:     Findings: Bruising present. Neurological:      General: No focal deficit present. Mental Status: She is alert and oriented to person, place, and time. Psychiatric:         Mood and Affect: Mood normal.         Behavior: Behavior normal.         Thought Content: Thought content normal.         Judgment: Judgment normal.         DIAGNOSTIC RESULTS   Labs:No results found for this visit on 02/12/21. IMAGING:  XR HUMERUS RIGHT (MIN 2 VIEWS)   Final Result   Normal  humerus. **This report has been created using voice recognition software. It may contain minor errors which are inherent in voice recognition technology. **      Final report electronically signed by Dr. Geovani Burgess on 2/12/2021 10:49 AM        URGENT CARE COURSE:     Vitals:    02/12/21 1012   BP: (!) 146/80   Pulse: 56   Resp: 16   Temp: 97.3 °F (36.3 °C)   TempSrc: Temporal   SpO2: 98%   Weight: 200 lb (90.7 kg)       Medications - No data to display  PROCEDURES:  None  FINAL IMPRESSION      1. Brachial plexus dysfunction        DISPOSITION/PLAN     Eat a few servings of vegetables every day, drink lots of water, avoid processed food. Stretch and meditate daily, workout, gentle stretching, at least 3 times a week, rest and think positively.      PATIENT REFERRED TO:  Andrea Carver MD  [place] [id number]  [phone]    Schedule an appointment as soon as possible for a visit in 1 week  For re-check    Fina Summers, [address] [address]  [phone]    Call   As needed    DISCHARGE MEDICATIONS:  Discharge Medication List as of 2/12/2021 10:59 AM      START taking these medications    Details   predniSONE (DELTASONE) 20 MG tablet Take 2 tablets by mouth 2 times daily for 4 days, THEN 1 tablet 2 times daily for 4 days. , Disp-24 tablet, R-0Normal           Discharge Medication List as of 2/12/2021 10:59 AM          Fabi Ventura, ANA - CHAYA Ventura, ANA - CNP  02/12/21 2643

## 2021-03-10 ENCOUNTER — HOSPITAL ENCOUNTER (OUTPATIENT)
Dept: WOMENS IMAGING | Age: 71
Discharge: HOME | End: 2021-03-10
Payer: MEDICARE

## 2021-03-10 DIAGNOSIS — Z12.31 VISIT FOR SCREENING MAMMOGRAM: ICD-10-CM

## 2021-03-10 PROCEDURE — 77063 BREAST TOMOSYNTHESIS BI: CPT

## 2021-05-30 ENCOUNTER — APPOINTMENT (OUTPATIENT)
Dept: CT IMAGING | Age: 71
End: 2021-05-30
Payer: MEDICARE

## 2021-05-30 ENCOUNTER — HOSPITAL ENCOUNTER (EMERGENCY)
Age: 71
Discharge: HOME | End: 2021-05-30
Attending: EMERGENCY MEDICINE
Payer: MEDICARE

## 2021-05-30 VITALS
WEIGHT: 208 LBS | DIASTOLIC BLOOD PRESSURE: 84 MMHG | TEMPERATURE: 98.5 F | HEIGHT: 63 IN | BODY MASS INDEX: 36.86 KG/M2 | HEART RATE: 67 BPM | SYSTOLIC BLOOD PRESSURE: 169 MMHG | OXYGEN SATURATION: 97 % | RESPIRATION RATE: 16 BRPM

## 2021-05-30 DIAGNOSIS — K29.90 GASTRITIS AND DUODENITIS: Primary | ICD-10-CM

## 2021-05-30 LAB
ALBUMIN SERPL-MCNC: 4.1 G/DL (ref 3.5–5.1)
ALP BLD-CCNC: 113 U/L (ref 38–126)
ALT SERPL-CCNC: 21 U/L (ref 11–66)
ANION GAP SERPL CALCULATED.3IONS-SCNC: 10 MEQ/L (ref 8–16)
AST SERPL-CCNC: 22 U/L (ref 5–40)
BASOPHILS # BLD: 0.5 %
BASOPHILS ABSOLUTE: 0 THOU/MM3 (ref 0–0.1)
BILIRUB SERPL-MCNC: 0.2 MG/DL (ref 0.3–1.2)
BILIRUBIN URINE: NEGATIVE
BLOOD, URINE: NEGATIVE
BUN BLDV-MCNC: 18 MG/DL (ref 7–22)
CALCIUM SERPL-MCNC: 9.5 MG/DL (ref 8.5–10.5)
CHARACTER, URINE: CLEAR
CHLORIDE BLD-SCNC: 103 MEQ/L (ref 98–111)
CO2: 27 MEQ/L (ref 23–33)
COLOR: YELLOW
CREAT SERPL-MCNC: 0.7 MG/DL (ref 0.4–1.2)
EOSINOPHIL # BLD: 1.8 %
EOSINOPHILS ABSOLUTE: 0.1 THOU/MM3 (ref 0–0.4)
ERYTHROCYTE [DISTWIDTH] IN BLOOD BY AUTOMATED COUNT: 13.4 % (ref 11.5–14.5)
ERYTHROCYTE [DISTWIDTH] IN BLOOD BY AUTOMATED COUNT: 44.6 FL (ref 35–45)
GFR SERPL CREATININE-BSD FRML MDRD: 82 ML/MIN/1.73M2
GLUCOSE BLD-MCNC: 125 MG/DL (ref 70–108)
GLUCOSE URINE: NEGATIVE MG/DL
HCT VFR BLD CALC: 41.9 % (ref 37–47)
HEMOGLOBIN: 13.2 GM/DL (ref 12–16)
IMMATURE GRANS (ABS): 0.02 THOU/MM3 (ref 0–0.07)
IMMATURE GRANULOCYTES: 0.3 %
KETONES, URINE: 15
LEUKOCYTE ESTERASE, URINE: NEGATIVE
LIPASE: 184.1 U/L (ref 5.6–51.3)
LYMPHOCYTES # BLD: 19.8 %
LYMPHOCYTES ABSOLUTE: 1.6 THOU/MM3 (ref 1–4.8)
MCH RBC QN AUTO: 28.7 PG (ref 26–33)
MCHC RBC AUTO-ENTMCNC: 31.5 GM/DL (ref 32.2–35.5)
MCV RBC AUTO: 91.1 FL (ref 81–99)
MONOCYTES # BLD: 8.5 %
MONOCYTES ABSOLUTE: 0.7 THOU/MM3 (ref 0.4–1.3)
NITRITE, URINE: NEGATIVE
NUCLEATED RED BLOOD CELLS: 0 /100 WBC
OSMOLALITY CALCULATION: 282.8 MOSMOL/KG (ref 275–300)
PH UA: 7 (ref 5–9)
PLATELET # BLD: 165 THOU/MM3 (ref 130–400)
PMV BLD AUTO: 10 FL (ref 9.4–12.4)
POTASSIUM REFLEX MAGNESIUM: 4.3 MEQ/L (ref 3.5–5.2)
PROTEIN UA: NEGATIVE
RBC # BLD: 4.6 MILL/MM3 (ref 4.2–5.4)
SEG NEUTROPHILS: 69.1 %
SEGMENTED NEUTROPHILS ABSOLUTE COUNT: 5.5 THOU/MM3 (ref 1.8–7.7)
SODIUM BLD-SCNC: 140 MEQ/L (ref 135–145)
SPECIFIC GRAVITY, URINE: > 1.03 (ref 1–1.03)
TOTAL PROTEIN: 6.9 G/DL (ref 6.1–8)
TROPONIN T: < 0.01 NG/ML
UROBILINOGEN, URINE: 0.2 EU/DL (ref 0–1)
WBC # BLD: 7.9 THOU/MM3 (ref 4.8–10.8)

## 2021-05-30 PROCEDURE — 2500000003 HC RX 250 WO HCPCS: Performed by: EMERGENCY MEDICINE

## 2021-05-30 PROCEDURE — 96375 TX/PRO/DX INJ NEW DRUG ADDON: CPT

## 2021-05-30 PROCEDURE — 2580000003 HC RX 258: Performed by: EMERGENCY MEDICINE

## 2021-05-30 PROCEDURE — 99284 EMERGENCY DEPT VISIT MOD MDM: CPT

## 2021-05-30 PROCEDURE — 84484 ASSAY OF TROPONIN QUANT: CPT

## 2021-05-30 PROCEDURE — 6370000000 HC RX 637 (ALT 250 FOR IP): Performed by: EMERGENCY MEDICINE

## 2021-05-30 PROCEDURE — 6360000002 HC RX W HCPCS: Performed by: EMERGENCY MEDICINE

## 2021-05-30 PROCEDURE — 83690 ASSAY OF LIPASE: CPT

## 2021-05-30 PROCEDURE — 6360000004 HC RX CONTRAST MEDICATION: Performed by: EMERGENCY MEDICINE

## 2021-05-30 PROCEDURE — 96374 THER/PROPH/DIAG INJ IV PUSH: CPT

## 2021-05-30 PROCEDURE — 85025 COMPLETE CBC W/AUTO DIFF WBC: CPT

## 2021-05-30 PROCEDURE — 93005 ELECTROCARDIOGRAM TRACING: CPT | Performed by: EMERGENCY MEDICINE

## 2021-05-30 PROCEDURE — 81003 URINALYSIS AUTO W/O SCOPE: CPT

## 2021-05-30 PROCEDURE — 80053 COMPREHEN METABOLIC PANEL: CPT

## 2021-05-30 PROCEDURE — 36415 COLL VENOUS BLD VENIPUNCTURE: CPT

## 2021-05-30 PROCEDURE — 74177 CT ABD & PELVIS W/CONTRAST: CPT

## 2021-05-30 RX ORDER — ONDANSETRON 2 MG/ML
4 INJECTION INTRAMUSCULAR; INTRAVENOUS ONCE
Status: COMPLETED | OUTPATIENT
Start: 2021-05-30 | End: 2021-05-30

## 2021-05-30 RX ORDER — FAMOTIDINE 20 MG/1
20 TABLET, FILM COATED ORAL 2 TIMES DAILY
Qty: 60 TABLET | Refills: 3 | Status: SHIPPED | OUTPATIENT
Start: 2021-05-30 | End: 2022-06-20

## 2021-05-30 RX ORDER — 0.9 % SODIUM CHLORIDE 0.9 %
1000 INTRAVENOUS SOLUTION INTRAVENOUS ONCE
Status: COMPLETED | OUTPATIENT
Start: 2021-05-30 | End: 2021-05-30

## 2021-05-30 RX ORDER — ONDANSETRON 4 MG/1
4 TABLET, ORALLY DISINTEGRATING ORAL EVERY 8 HOURS PRN
Qty: 12 TABLET | Refills: 0 | Status: SHIPPED | OUTPATIENT
Start: 2021-05-30

## 2021-05-30 RX ADMIN — ONDANSETRON 4 MG: 2 INJECTION INTRAMUSCULAR; INTRAVENOUS at 20:11

## 2021-05-30 RX ADMIN — IOPAMIDOL 80 ML: 755 INJECTION, SOLUTION INTRAVENOUS at 20:21

## 2021-05-30 RX ADMIN — SODIUM CHLORIDE 1000 ML: 9 INJECTION, SOLUTION INTRAVENOUS at 20:11

## 2021-05-30 RX ADMIN — LIDOCAINE HYDROCHLORIDE: 20 SOLUTION ORAL; TOPICAL at 21:34

## 2021-05-30 RX ADMIN — FAMOTIDINE 20 MG: 10 INJECTION, SOLUTION INTRAVENOUS at 20:11

## 2021-05-30 ASSESSMENT — PAIN DESCRIPTION - DESCRIPTORS
DESCRIPTORS: PATIENT UNABLE TO DESCRIBE
DESCRIPTORS: ACHING
DESCRIPTORS: PATIENT UNABLE TO DESCRIBE
DESCRIPTORS: PATIENT UNABLE TO DESCRIBE

## 2021-05-30 ASSESSMENT — PAIN SCALES - GENERAL
PAINLEVEL_OUTOF10: 2
PAINLEVEL_OUTOF10: 6
PAINLEVEL_OUTOF10: 2
PAINLEVEL_OUTOF10: 2

## 2021-05-30 ASSESSMENT — PAIN DESCRIPTION - LOCATION
LOCATION: ABDOMEN

## 2021-05-30 ASSESSMENT — PAIN DESCRIPTION - PAIN TYPE
TYPE: ACUTE PAIN

## 2021-05-30 ASSESSMENT — PAIN DESCRIPTION - FREQUENCY
FREQUENCY: CONTINUOUS
FREQUENCY: CONTINUOUS
FREQUENCY: INTERMITTENT
FREQUENCY: CONTINUOUS

## 2021-05-30 ASSESSMENT — PAIN DESCRIPTION - ORIENTATION
ORIENTATION: RIGHT;LEFT;MID;UPPER
ORIENTATION: LEFT;RIGHT;UPPER
ORIENTATION: RIGHT;LEFT;MID;UPPER

## 2021-05-30 NOTE — ED PROVIDER NOTES
ARTHROSCOPY Right 2003    OR BX OF BREAST,VACUUM ASST,IMAGE GUIDE Right 09/26/2013    Radial scar    SINUS SURGERY  1994    SKIN BIOPSY      TONSILLECTOMY  1955    UPPER GASTROINTESTINAL ENDOSCOPY  07/25/2017    Dr Goldy Tesfaye  2003    casandra filter placed       CURRENT MEDICATIONS       Discharge Medication List as of 5/30/2021  9:47 PM      CONTINUE these medications which have NOT CHANGED    Details   acetaminophen (TYLENOL) 325 MG tablet Take 650 mg by mouth every 6 hours as needed for PainHistorical Med      pantoprazole (PROTONIX) 40 MG tablet Take 1 tablet by mouth every morning (before breakfast), Disp-30 tablet,R-5Normal      Multiple Vitamins-Minerals (EYE VITAMINS PO) Take by mouth dailyHistorical Med      Ascorbic Acid (VITAMIN C PO) Take by mouth dailyHistorical Med      MULTIPLE VITAMIN PO Take by mouth dailyHistorical Med      Lactobacillus (ACIDOPHILUS PO) Take by mouth daily Indications: 1Billion 3 dailyHistorical Med      diphenoxylate-atropine (LOMOTIL) 2.5-0.025 MG per tablet Take 1 tablet by mouth 4 times daily as needed for Diarrhea. Historical Med      atorvastatin (LIPITOR) 10 MG tablet Take 10 mg by mouth dailyHistorical Med      venlafaxine (EFFEXOR XR) 150 MG extended release capsule Take 150 mg by mouth dailyHistorical Med      levothyroxine (LEVOTHROID) 25 MCG tablet Take 25 mcg by mouth DailyHistorical Med      sitaGLIPtan (JANUVIA) 100 MG tablet Take 100 mg by mouth daily. lisinopril (PRINIVIL;ZESTRIL) 10 MG tablet Take 10 mg by mouth daily. atenolol (TENORMIN) 25 MG tablet Take 25 mg by mouth daily. glimepiride (AMARYL) 1 MG tablet Take 1 mg by mouth every morning (before breakfast).              ALLERGIES     Cephalosporins and Morphine    FAMILY HISTORY       Family History   Problem Relation Age of Onset    Arthritis Mother     Cancer Mother         lung    Diabetes Mother     Heart Disease Father     High Blood Pressure Father  Thyroid Cancer Sister     Other Sister         graves disease    Breast Cancer Paternal Aunt         SOCIAL HISTORY       Social History     Socioeconomic History    Marital status:      Spouse name: None    Number of children: None    Years of education: None    Highest education level: None   Occupational History    None   Tobacco Use    Smoking status: Never Smoker    Smokeless tobacco: Never Used   Vaping Use    Vaping Use: Never used   Substance and Sexual Activity    Alcohol use: None    Drug use: No    Sexual activity: Yes     Partners: Male   Other Topics Concern    None   Social History Narrative    None     Social Determinants of Health     Financial Resource Strain:     Difficulty of Paying Living Expenses:    Food Insecurity:     Worried About Running Out of Food in the Last Year:     Ran Out of Food in the Last Year:    Transportation Needs:     Lack of Transportation (Medical):  Lack of Transportation (Non-Medical):    Physical Activity:     Days of Exercise per Week:     Minutes of Exercise per Session:    Stress:     Feeling of Stress :    Social Connections:     Frequency of Communication with Friends and Family:     Frequency of Social Gatherings with Friends and Family:     Attends Spiritism Services:     Active Member of Clubs or Organizations:     Attends Club or Organization Meetings:     Marital Status:    Intimate Partner Violence:     Fear of Current or Ex-Partner:     Emotionally Abused:     Physically Abused:     Sexually Abused:        REVIEW OF SYSTEMS     Review of Systems   Constitutional: Negative for fatigue and fever. HENT: Negative for congestion and trouble swallowing. Eyes: Negative for redness. Respiratory: Negative for cough and shortness of breath. Cardiovascular: Negative for chest pain. Gastrointestinal: Positive for abdominal pain. Negative for constipation, diarrhea, nausea and vomiting.    Genitourinary: Negative for dysuria. Musculoskeletal: Negative for back pain. Skin: Negative for rash. Allergic/Immunologic: Negative for immunocompromised state. Neurological: Negative for light-headedness. Hematological: Does not bruise/bleed easily. Except as noted above the remainder of the review of systems was reviewed and is. PHYSICAL EXAM    (up to 7 for level 4, 8 or more for level 5)     ED Triage Vitals   BP Temp Temp Source Pulse Resp SpO2 Height Weight   05/30/21 1906 05/30/21 1914 05/30/21 1914 05/30/21 1906 05/30/21 1906 05/30/21 1906 05/30/21 1906 05/30/21 1906   (!) 180/85 98.5 °F (36.9 °C) Oral 69 18 97 % 5' 3\" (1.6 m) 208 lb (94.3 kg)       Physical Exam  Vitals and nursing note reviewed. Exam conducted with a chaperone present. Constitutional:       General: She is not in acute distress. Appearance: She is normal weight. She is not ill-appearing. HENT:      Head: Normocephalic and atraumatic. Nose: Nose normal. No congestion. Mouth/Throat:      Mouth: Mucous membranes are moist.      Pharynx: Oropharynx is clear. No oropharyngeal exudate or posterior oropharyngeal erythema. Eyes:      Extraocular Movements: Extraocular movements intact. Pupils: Pupils are equal, round, and reactive to light. Cardiovascular:      Rate and Rhythm: Normal rate and regular rhythm. Pulses: Normal pulses. Heart sounds: No murmur heard. No friction rub. Pulmonary:      Effort: Pulmonary effort is normal. No respiratory distress. Breath sounds: Normal breath sounds. No wheezing. Abdominal:      General: Abdomen is flat. Bowel sounds are normal. There is no distension. Palpations: Abdomen is soft. Tenderness: There is abdominal tenderness in the epigastric area. There is no guarding or rebound. Musculoskeletal:         General: Normal range of motion. Skin:     General: Skin is warm and dry. Capillary Refill: Capillary refill takes less than 2 seconds. Neurological:      General: No focal deficit present. Mental Status: She is alert and oriented to person, place, and time. DIAGNOSTIC RESULTS     EKG:(none if blank)  All EKG's are interpreted [facility] Phys[facility] who either signs or Co-signs this chart in the absence of a cardiologist.        RADIOLOGY: (none if blank)   Interpretation per the Radiologistbelow, if available at the time of this note:    CT ABDOMEN PELVIS W IV CONTRAST Additional Contrast? None   Final Result   1. Thickening of the wall of the proximal duodenum, compatible with    duodenitis. 2. Inferior vena cava filter in place. 3. Cholelithiasis. 4. Mild colonic diverticulosis without diverticulitis. This document has been electronically signed by: Kana Best MD on    05/30/2021 08:56 PM      All CTs at this facility use dose modulation techniques and iterative    reconstructions, and/or weight-based dosing   when appropriate to reduce radiation to a low as reasonably achievable.           LABS:  Labs Reviewed   COMPREHENSIVE METABOLIC PANEL W/ REFLEX TO MG FOR LOW K - Abnormal; Notable for the following components:       Result Value    Glucose 125 (*)     Total Bilirubin 0.2 (*)     All other components within normal limits   CBC WITH AUTO DIFFERENTIAL - Abnormal; Notable for the following components:    MCHC 31.5 (*)     All other components within normal limits   URINE RT REFLEX TO CULTURE - Abnormal; Notable for the following components:    Ketones, Urine 15 (*)     Specific Gravity, Urine > 1.030 (*)     All other components within normal limits   LIPASE - Abnormal; Notable for the following components:    Lipase 184.1 (*)     All other components within normal limits   GLOMERULAR FILTRATION RATE, ESTIMATED - Abnormal; Notable for the following components:    Est, Glom Filt Rate 82 (*)     All other components within normal limits   ANION GAP   OSMOLALITY   TROPONIN       All other labs were 2021)   aluminum & magnesium hydroxide-simethicone (MAALOX) 30 mL, lidocaine viscous hcl (XYLOCAINE) 5 mL (GI COCKTAIL) ( Oral Given 5/30/21 7003)         Procedures: (None if blank)       CLINICAL       1.  Gastritis and duodenitis          DISPOSITION/PLAN   DISPOSITION Decision To Discharge 05/30/2021 09:45:22 PM      PATIENT REFERRED TO:  Norm Marx MD  [address]  [phone]    Schedule an appointment as soon as possible for a visit in 1 week  If symptoms worsen      DISCHARGE MEDICATIONS:  Discharge Medication List as of 5/30/2021  9:47 PM      START taking these medications    Details   famotidine (PEPCID) 20 MG tablet Take 1 tablet by mouth 2 times daily, Disp-60 tablet, R-3Normal      ondansetron (ZOFRAN ODT) 4 MG disintegrating tablet Take 1 tablet by mouth every 8 hours as needed for Nausea or Vomiting, Disp-12 tablet, R-0Normal                    (Please note that portions of this note were completed with a voice recognition program.  Efforts were made to edit the dictations but occasionallywords are mis-transcribed.)      Electronically signed by Anat Mo MD on 5/30/21 at 7:53 PM EDT    Attending Physician, Emergency Department       Rosa Ramos MD  05/31/21 6360

## 2021-05-31 LAB
EKG ATRIAL RATE: 63 BPM
EKG P AXIS: 51 DEGREES
EKG P-R INTERVAL: 160 MS
EKG Q-T INTERVAL: 414 MS
EKG QRS DURATION: 86 MS
EKG QTC CALCULATION (BAZETT): 423 MS
EKG R AXIS: 2 DEGREES
EKG T AXIS: 59 DEGREES
EKG VENTRICULAR RATE: 63 BPM

## 2021-05-31 ASSESSMENT — ENCOUNTER SYMPTOMS
VOMITING: 0
SHORTNESS OF BREATH: 0
NAUSEA: 0
ABDOMINAL PAIN: 1
EYE REDNESS: 0
CONSTIPATION: 0
BACK PAIN: 0
TROUBLE SWALLOWING: 0
COUGH: 0
DIARRHEA: 0

## 2021-12-15 ENCOUNTER — HOSPITAL ENCOUNTER (EMERGENCY)
Age: 71
Discharge: HOME | End: 2021-12-15
Payer: MEDICARE

## 2021-12-15 VITALS
SYSTOLIC BLOOD PRESSURE: 177 MMHG | DIASTOLIC BLOOD PRESSURE: 73 MMHG | TEMPERATURE: 98.2 F | OXYGEN SATURATION: 98 % | HEART RATE: 70 BPM | RESPIRATION RATE: 16 BRPM

## 2021-12-15 DIAGNOSIS — R22.41 LOWER LEG MASS, RIGHT: Primary | ICD-10-CM

## 2021-12-15 PROCEDURE — 99282 EMERGENCY DEPT VISIT SF MDM: CPT

## 2021-12-15 ASSESSMENT — ENCOUNTER SYMPTOMS
DIARRHEA: 0
COLOR CHANGE: 0
SORE THROAT: 0
NAUSEA: 0
VOMITING: 0
COUGH: 0

## 2021-12-15 NOTE — ED TRIAGE NOTES
Pt presents to the ED via lobby with c/o a lump on her right calf. Pt states she noted the lump last week and states that it has increased in size since. Pt denies pain.  No redness noted

## 2021-12-15 NOTE — ED PROVIDER NOTES
[facility] [address] [address] EMERGENCY DEPT    EMERGENCY MEDICINE     Pt Name: Keyshawn Najera  MRN: [id number]  Christian 1950  Date of evaluation: 12/15/2021  Provider: Paulette Lee PA-C    CHIEF COMPLAINT       Chief Complaint   Patient presents with    Other     lump on right calf       HISTORY  [place] Cosme Ernst is a pleasant 66 y.o. female who presents to the emergency department for left lower leg bump. Patient states the past week she recently in the past week noticed a bump on the back of her left lower leg. It is not getting progressively worse or bigger in size. She states it is nontender unless you really push hard on it then it causes mild discomfort. She has no complaints of fevers or chills. No complaints of shortness of breath or coughing. No complaints of numbness and tingling to the extremity. Patient has history of DVT in the past and she states that it does not feel like that. She has not had any swelling to the leg but only those 1 small bump on the back of her leg. She is able to ambulate without discomfort. Triage notes and Nursing notes were reviewed by myself. Any discrepancies are addressed above. PAST MEDICAL HISTORY     Past Medical History:   Diagnosis Date    Arthritis     Asthma     seasonal    Depression     Fibromyalgia     GERD (gastroesophageal reflux disease)     Hx of blood clots 2003    DVT after arthroscopy right knee    Hyperlipidemia     Hypertension     Hypothyroidism     Restless legs syndrome     Type II or unspecified type diabetes mellitus without mention of complication, not stated as uncontrolled     Unspecified sleep apnea     no CPAP       SURGICAL HISTORY       Past Surgical History:   Procedure Laterality Date    BREAST LUMPECTOMY Right 10/17/2013    radial scar removal    CARDIAC CATHETERIZATION  2006??    results ok    COLONOSCOPY      last one 2014??    COLONOSCOPY  08/11/2017    Dr Anabel Almeida  feb. 2016 left cataract surgery    HYSTERECTOMY  1992    JOINT REPLACEMENT Right 2013    total knee replacement    KNEE ARTHROSCOPY Right 2003    IN BX OF BREAST,VACUUM ASST,IMAGE GUIDE Right 09/26/2013    Radial scar    SINUS SURGERY  1994    SKIN BIOPSY      TONSILLECTOMY  1955    UPPER GASTROINTESTINAL ENDOSCOPY  07/25/2017    Dr William Dc  2003    casandra filter placed       CURRENT MEDICATIONS       Discharge Medication List as of 12/15/2021  3:31 PM      CONTINUE these medications which have NOT CHANGED    Details   pantoprazole (PROTONIX) 40 MG tablet Take 1 tablet by mouth every morning (before breakfast), Disp-90 tablet, R-2Normal      famotidine (PEPCID) 20 MG tablet Take 1 tablet by mouth 2 times daily, Disp-60 tablet, R-3Normal      ondansetron (ZOFRAN ODT) 4 MG disintegrating tablet Take 1 tablet by mouth every 8 hours as needed for Nausea or Vomiting, Disp-12 tablet, R-0Normal      acetaminophen (TYLENOL) 325 MG tablet Take 650 mg by mouth every 6 hours as needed for PainHistorical Med      Multiple Vitamins-Minerals (EYE VITAMINS PO) Take by mouth dailyHistorical Med      Ascorbic Acid (VITAMIN C PO) Take by mouth dailyHistorical Med      MULTIPLE VITAMIN PO Take by mouth dailyHistorical Med      Lactobacillus (ACIDOPHILUS PO) Take by mouth daily Indications: 1Billion 3 dailyHistorical Med      diphenoxylate-atropine (LOMOTIL) 2.5-0.025 MG per tablet Take 1 tablet by mouth 4 times daily as needed for Diarrhea. Historical Med      atorvastatin (LIPITOR) 10 MG tablet Take 10 mg by mouth dailyHistorical Med      venlafaxine (EFFEXOR XR) 150 MG extended release capsule Take 150 mg by mouth dailyHistorical Med      levothyroxine (LEVOTHROID) 25 MCG tablet Take 25 mcg by mouth DailyHistorical Med      sitaGLIPtan (JANUVIA) 100 MG tablet Take 100 mg by mouth daily. lisinopril (PRINIVIL;ZESTRIL) 10 MG tablet Take 10 mg by mouth daily.       atenolol (TENORMIN) 25 MG tablet Take 25 mg by mouth daily. glimepiride (AMARYL) 1 MG tablet Take 1 mg by mouth every morning (before breakfast). ALLERGIES     Cephalosporins and Morphine    FAMILY HISTORY       Family History   Problem Relation Age of Onset    Arthritis Mother     Cancer Mother         lung    Diabetes Mother     Heart Disease Father     High Blood Pressure Father     Thyroid Cancer Sister     Other Sister         graves disease    Breast Cancer Paternal Aunt         SOCIAL HISTORY       Social History     Socioeconomic History    Marital status:      Spouse name: None    Number of children: None    Years of education: None    Highest education level: None   Occupational History    None   Tobacco Use    Smoking status: Never Smoker    Smokeless tobacco: Never Used   Vaping Use    Vaping Use: Never used   Substance and Sexual Activity    Alcohol use: None    Drug use: No    Sexual activity: Yes     Partners: Male   Other Topics Concern    None   Social History Narrative    None     Social Determinants of Health     Financial Resource Strain:     Difficulty of Paying Living Expenses: Not on file   Food Insecurity:     Worried About Running Out of Food in the Last Year: Not on file    Galileo of Food in the Last Year: Not on file   Transportation Needs:     Lack of Transportation (Medical): Not on file    Lack of Transportation (Non-Medical):  Not on file   Physical Activity:     Days of Exercise per Week: Not on file    Minutes of Exercise per Session: Not on file   Stress:     Feeling of Stress : Not on file   Social Connections:     Frequency of Communication with Friends and Family: Not on file    Frequency of Social Gatherings with Friends and Family: Not on file    Attends Sikhism Services: Not on file    Active Member of Clubs or Organizations: Not on file    Attends Club or Organization Meetings: Not on file    Marital Status: Not on file   Intimate Partner Violence:  Fear of Current or Ex-Partner: Not on file    Emotionally Abused: Not on file    Physically Abused: Not on file    Sexually Abused: Not on file   Housing Stability:     Unable to Pay for Housing in the Last Year: Not on file    Number of Places Lived in the Last Year: Not on file    Unstable Housing in the Last Year: Not on file       REVIEW OF SYSTEMS     Review of Systems   Constitutional: Negative for chills and fever. HENT: Negative for congestion, ear pain and sore throat. Respiratory: Negative for cough. Cardiovascular: Negative for chest pain and palpitations. Gastrointestinal: Negative for diarrhea, nausea and vomiting. Genitourinary: Negative for dysuria and urgency. Musculoskeletal: Negative for arthralgias, myalgias and neck pain. Skin: Negative for color change, pallor, rash and wound. Small palpable mass       Except as noted above the remainder of the review of systems was reviewed and is. PHYSICAL EXAM     INITIAL VITALS:  oral temperature is 98.2 °F (36.8 °C). Her blood pressure is 177/73 (abnormal) and her pulse is 70. Her respiration is 16 and oxygen saturation is 98%. Physical Exam  Vitals and nursing note reviewed. Exam conducted with a chaperone present. Constitutional:       General: She is not in acute distress. Appearance: Normal appearance. She is normal weight. She is not ill-appearing, toxic-appearing or diaphoretic. HENT:      Head: Normocephalic and atraumatic. Right Ear: External ear normal.      Left Ear: External ear normal.      Nose: Nose normal.      Mouth/Throat:      Mouth: Mucous membranes are moist.   Eyes:      Pupils: Pupils are equal, round, and reactive to light. Cardiovascular:      Rate and Rhythm: Normal rate and regular rhythm. Pulses: Normal pulses. Pulmonary:      Effort: Pulmonary effort is normal. No respiratory distress. Abdominal:      General: There is no distension.       Palpations: Abdomen is soft.      Tenderness: There is no abdominal tenderness. Musculoskeletal:         General: Normal range of motion. Cervical back: Normal range of motion and neck supple. Left lower leg: Normal.        Legs:       Comments: Right lower extremity 2+ palpable dorsalis pedis and posterior tibial pulse. Sensation intact distally light touch throughout the foot. 5/5 strength EHL, dorsiflexion, plantarflexion. Skin:     General: Skin is warm and dry. Neurological:      Mental Status: She is alert and oriented to person, place, and time. Psychiatric:         Mood and Affect: Mood normal.         Behavior: Behavior normal.         Thought Content: Thought content normal.         DIFFERENTIAL DIAGNOSIS:   Differential diagnoses are discussed    DIAGNOSTIC RESULTS     EKG: All EKG's are interpreted by the Emergency Department Physician who either signs or Co-signsthis chart in the absence of a cardiologist.    None    RADIOLOGY: non-plain film images(s) such as CT, Ultrasound and MRI are read by the radiologist.    No orders to display       LABS:   Labs Reviewed - No data to display    EMERGENCY DEPARTMENT COURSE:   Vitals:    Vitals:    12/15/21 1417   BP: (!) 177/73   Pulse: 70   Resp: 16   Temp: 98.2 °F (36.8 °C)   TempSrc: Oral   SpO2: 98%     3:45 PM EST: The patient was seen and evaluated. MDM:  Patient is 73-year-old female that is being evaluated for right lower leg mass that she noticed in the past week. It is not getting progressively worse or bigger in size. It is nontender with no erythema or signs of infection. Negative Homans. No extremity edema otherwise. At this point discussed possible diagnoses with the patient and believe this can be treated not emergently. Recommend patient following up with orthopedist or general surgery for reevaluation and determination if needing surgically removed. Recommend heat massage at the area of the mass.   He is over-the-counter medication as needed for pain control. Patient has no pain with ambulation and is able to be discharged home from the ED. Patient understands and agrees to treatment plan. If worsening symptoms of redness or swelling may follow-up to the ED in the next 2 days. CRITICAL CARE:   None    CONSULTS:  None    PROCEDURES:  None    FINAL IMPRESSION      1.  Lower leg mass, right          DISPOSITION/PLAN   Discharged home    PATIENT REFERRED TO:  Cris Ferreira MD  [facility]  [phone]    In 3 days  For re-evaluation    [address] [facility] [address] [address] EMERGENCY DEPT  [address] [address]  [phone]  In 2 days  If symptoms worsen    [address]  [phone]  In 2 days  If not improving      DISCHARGEMEDICATIONS:  Discharge Medication List as of 12/15/2021  3:31 PM              (Please note that portions of this note were completed with a voice recognition program.  Efforts were made to edit the dictations but occasionallywords are mis-transcribed.)      Sierra Andersen PA-C(electronically signed)  Physician Associate, Emergency Department       Sierra Andersen PA-C  12/15/21 [id number]

## 2022-03-11 ENCOUNTER — HOSPITAL ENCOUNTER (OUTPATIENT)
Dept: WOMENS IMAGING | Age: 72
Discharge: HOME | End: 2022-03-11

## 2022-03-11 DIAGNOSIS — Z12.31 VISIT FOR SCREENING MAMMOGRAM: ICD-10-CM

## 2022-03-11 PROCEDURE — 77067 SCR MAMMO BI INCL CAD: CPT

## 2022-05-20 ENCOUNTER — HOSPITAL ENCOUNTER (OUTPATIENT)
Dept: CT IMAGING | Age: 72
Discharge: HOME | End: 2022-05-20
Payer: COMMERCIAL

## 2022-05-20 DIAGNOSIS — R19.7 DIARRHEA, UNSPECIFIED TYPE: ICD-10-CM

## 2022-05-20 DIAGNOSIS — R79.9 ABNORMAL BLOOD CHEMISTRY: ICD-10-CM

## 2022-05-20 DIAGNOSIS — R14.0 BLOATING: ICD-10-CM

## 2022-05-20 LAB — POC CREATININE WHOLE BLOOD: 0.8 MG/DL (ref 0.5–1.2)

## 2022-05-20 PROCEDURE — 6360000004 HC RX CONTRAST MEDICATION: Performed by: NURSE PRACTITIONER

## 2022-05-20 PROCEDURE — 82565 ASSAY OF CREATININE: CPT

## 2022-05-20 PROCEDURE — 74177 CT ABD & PELVIS W/CONTRAST: CPT

## 2022-05-20 RX ADMIN — IOPAMIDOL 85 ML: 755 INJECTION, SOLUTION INTRAVENOUS at 11:16

## 2022-05-20 RX ADMIN — IOHEXOL 50 ML: 240 INJECTION, SOLUTION INTRATHECAL; INTRAVASCULAR; INTRAVENOUS; ORAL at 11:17

## 2022-06-20 ENCOUNTER — HOSPITAL ENCOUNTER (EMERGENCY)
Age: 72
Discharge: HOME | End: 2022-06-20
Payer: COMMERCIAL

## 2022-06-20 VITALS
RESPIRATION RATE: 16 BRPM | BODY MASS INDEX: 34.15 KG/M2 | DIASTOLIC BLOOD PRESSURE: 84 MMHG | SYSTOLIC BLOOD PRESSURE: 152 MMHG | HEART RATE: 74 BPM | WEIGHT: 200 LBS | HEIGHT: 64 IN | TEMPERATURE: 98.3 F | OXYGEN SATURATION: 97 %

## 2022-06-20 DIAGNOSIS — R19.7 DIARRHEA, UNSPECIFIED TYPE: Primary | ICD-10-CM

## 2022-06-20 PROCEDURE — 99213 OFFICE O/P EST LOW 20 MIN: CPT | Performed by: NURSE PRACTITIONER

## 2022-06-20 PROCEDURE — 99213 OFFICE O/P EST LOW 20 MIN: CPT

## 2022-06-20 ASSESSMENT — ENCOUNTER SYMPTOMS
EYE REDNESS: 0
NAUSEA: 1
SORE THROAT: 0
VOMITING: 0
TROUBLE SWALLOWING: 0
RHINORRHEA: 0
SHORTNESS OF BREATH: 0
DIARRHEA: 1
EYE DISCHARGE: 0
COUGH: 0

## 2022-06-20 ASSESSMENT — PAIN - FUNCTIONAL ASSESSMENT: PAIN_FUNCTIONAL_ASSESSMENT: NONE - DENIES PAIN

## 2022-06-20 NOTE — ED PROVIDER NOTES
[id number]       Chief Complaint   Patient presents with    Diarrhea     x6 last 24 hours       Nurses Notes reviewed and I agree except as noted in the HPI. HISTORY OF PRESENT ILLNESS   Suhail Mace is a 66 y.o. female who presents for evaluation of diarrhea. Onset Saturday, unchanged. She notes 6 episodes of loose/watery stool in the last 24 hours. No blood/mucus in stool. States stool color is yellow. No changes in diet. No travel. She was scheduled for a colonoscopy in May. However, her labs/imaging were normal and she cancelled the procedure. No current treatment. REVIEW OF SYSTEMS     Review of Systems   Constitutional: Positive for appetite change. Negative for chills, diaphoresis, fatigue, fever and unexpected weight change. HENT: Negative for congestion, ear pain, rhinorrhea, sore throat and trouble swallowing. Eyes: Negative for discharge and redness. Respiratory: Negative for cough and shortness of breath. Cardiovascular: Negative for chest pain. Gastrointestinal: Positive for diarrhea and nausea (resolved). Negative for vomiting. Genitourinary: Negative for decreased urine volume. Musculoskeletal: Negative for neck pain and neck stiffness. Skin: Negative for rash. Neurological: Negative for headaches. Hematological: Negative for adenopathy. Psychiatric/Behavioral: Negative for sleep disturbance.        PAST MEDICAL HISTORY         Diagnosis Date    Arthritis     Asthma     seasonal    Depression     Fibromyalgia     GERD (gastroesophageal reflux disease)     Hx of blood clots 2003    DVT after arthroscopy right knee    Hyperlipidemia     Hypertension     Hypothyroidism     Restless legs syndrome     Type II or unspecified type diabetes mellitus without mention of complication, not stated as uncontrolled     Unspecified sleep apnea     no CPAP       SURGICAL HISTORY     Patient has a past surgical history that includes sinus surgery (1994); Tonsillectomy (1955); Hysterectomy (1992); Knee arthroscopy (Right, 2003); Vena Cava Filter Placement (2003); Cardiac catheterization (2006??); Colonoscopy; joint replacement (Right, 2013); eye surgery (feb. 2016); skin biopsy; Upper gastrointestinal endoscopy (07/25/2017); Colonoscopy (08/11/2017); pr bx of breast,vacuum asst,image guide (Right, 09/26/2013); and Breast lumpectomy (Right, 10/17/2013). CURRENT MEDICATIONS       Discharge Medication List as of 6/20/2022 10:54 AM      CONTINUE these medications which have NOT CHANGED    Details   pantoprazole (PROTONIX) 40 MG tablet Take 1 tablet by mouth every morning (before breakfast), Disp-90 tablet, R-2Normal      ondansetron (ZOFRAN ODT) 4 MG disintegrating tablet Take 1 tablet by mouth every 8 hours as needed for Nausea or Vomiting, Disp-12 tablet, R-0Normal      acetaminophen (TYLENOL) 325 MG tablet Take 650 mg by mouth every 6 hours as needed for PainHistorical Med      Multiple Vitamins-Minerals (EYE VITAMINS PO) Take by mouth dailyHistorical Med      MULTIPLE VITAMIN PO Take by mouth dailyHistorical Med      Lactobacillus (ACIDOPHILUS PO) Take by mouth daily Indications: 1Billion 3 dailyHistorical Med      diphenoxylate-atropine (LOMOTIL) 2.5-0.025 MG per tablet Take 1 tablet by mouth 4 times daily as needed for Diarrhea. Historical Med      atorvastatin (LIPITOR) 10 MG tablet Take 10 mg by mouth dailyHistorical Med      venlafaxine (EFFEXOR XR) 150 MG extended release capsule Take 150 mg by mouth dailyHistorical Med      levothyroxine (LEVOTHROID) 25 MCG tablet Take 25 mcg by mouth DailyHistorical Med      sitaGLIPtan (JANUVIA) 100 MG tablet Take 100 mg by mouth daily. lisinopril (PRINIVIL;ZESTRIL) 10 MG tablet Take 10 mg by mouth daily. atenolol (TENORMIN) 25 MG tablet Take 25 mg by mouth daily.       glimepiride (AMARYL) 1 MG tablet Take 1 mg by mouth every morning (before breakfast). ALLERGIES     Patient is is allergic to cephalosporins and morphine. FAMILY HISTORY     Patient'sfamily history includes Arthritis in her mother; Breast Cancer in her paternal aunt; Cancer in her mother; Diabetes in her mother; Heart Disease in her father; High Blood Pressure in her father; Other in her sister; Thyroid Cancer in her sister. SOCIAL HISTORY     Patient  reports that she has never smoked. She has never used smokeless tobacco. She reports that she does not drink alcohol and does not use drugs. PHYSICAL EXAM     ED TRIAGE VITALS  BP: (!) 152/84, Temp: 98.3 °F (36.8 °C), Heart Rate: 74, Resp: 16, SpO2: 97 %  Physical Exam  Vitals and nursing note reviewed. Constitutional:       General: She is not in acute distress. Appearance: Normal appearance. She is well-developed. She is not ill-appearing, toxic-appearing or diaphoretic. HENT:      Head: Normocephalic and atraumatic. Right Ear: Hearing, tympanic membrane, ear canal and external ear normal. No mastoid tenderness. No hemotympanum. Tympanic membrane is not perforated, erythematous or bulging. Left Ear: Hearing, tympanic membrane, ear canal and external ear normal. No mastoid tenderness. No hemotympanum. Tympanic membrane is not perforated, erythematous or bulging. Nose: Nose normal.      Mouth/Throat:      Mouth: Mucous membranes are moist.      Pharynx: Oropharynx is clear. Uvula midline. Tonsils: 0 on the right. 0 on the left. Eyes:      General: No scleral icterus. Conjunctiva/sclera: Conjunctivae normal.      Right eye: Right conjunctiva is not injected. No hemorrhage. Left eye: Left conjunctiva is not injected. No hemorrhage. Neck:      Thyroid: No thyromegaly. Trachea: Trachea normal.   Cardiovascular:      Rate and Rhythm: Normal rate and regular rhythm. No extrasystoles are present. Chest Wall: PMI is not displaced. Heart sounds: Normal heart sounds.  No murmur heard.  No friction rub. No gallop. Pulmonary:      Effort: Pulmonary effort is normal. No respiratory distress. Breath sounds: Normal breath sounds. Chest:   Breasts:      Right: No supraclavicular adenopathy. Left: No supraclavicular adenopathy. Musculoskeletal:      Cervical back: Normal range of motion and neck supple. Lymphadenopathy:      Head:      Right side of head: No submental, submandibular, tonsillar or occipital adenopathy. Left side of head: No submental, submandibular, tonsillar or occipital adenopathy. Cervical: No cervical adenopathy. Upper Body:      Right upper body: No supraclavicular adenopathy. Left upper body: No supraclavicular adenopathy. Skin:     General: Skin is warm and dry. Capillary Refill: Capillary refill takes less than 2 seconds. Coloration: Skin is not pale. Findings: No rash. Comments: Skin warm and dry to touch, no rashes noted on exposed surfaces. Neurological:      Mental Status: She is alert and oriented to person, place, and time. She is not disoriented. Psychiatric:         Mood and Affect: Mood normal.         Behavior: Behavior is cooperative. DIAGNOSTIC RESULTS   Labs: No results found for this visit on 06/20/22. IMAGING:  No orders to display     URGENT CARE COURSE:     Vitals:    06/20/22 1035 06/20/22 1055   BP: (!) 182/86 (!) 152/84   Pulse: 72 74   Resp: 18 16   Temp: 98.3 °F (36.8 °C)    TempSrc: Temporal    SpO2: 96% 97%   Weight: 200 lb (90.7 kg)    Height: 5' 4\" (1.626 m)        Medications - No data to display  PROCEDURES:  None  FINALIMPRESSION      1. Diarrhea, unspecified type        DISPOSITION/PLAN   DISPOSITION Decision To Discharge 06/20/2022 10:43:12 AM  Non-toxic, no distress. Oropharynx clear/moist.  No concern for dehydration. Denies undercooked food. Discussed daily probiotic. Replace diarrhea with electrolyte solution. If worse go to ER.   PATIENT REFERRED TO:  MD [address]el [address]  [phone]      Follow up as needed. Push fluids, advance diet as tolerated. Replace diarrhea with electrolyte solution such as pedialyte, propel, or gatorade/powerade zero. Daily probiotic. If any distress go to ER.     DISCHARGE MEDICATIONS:  Discharge Medication List as of 6/20/2022 10:54 AM        Discharge Medication List as of 6/20/2022 10:54 AM          [address], APRN - CNP  06/20/22 1140

## 2022-08-11 ENCOUNTER — HOSPITAL ENCOUNTER (EMERGENCY)
Age: 72
Discharge: HOME | End: 2022-08-11
Attending: NURSE PRACTITIONER
Payer: MEDICARE

## 2022-08-11 ENCOUNTER — APPOINTMENT (OUTPATIENT)
Dept: CT IMAGING | Age: 72
End: 2022-08-11
Payer: MEDICARE

## 2022-08-11 VITALS
SYSTOLIC BLOOD PRESSURE: 140 MMHG | RESPIRATION RATE: 20 BRPM | TEMPERATURE: 99.1 F | HEIGHT: 63 IN | OXYGEN SATURATION: 97 % | HEART RATE: 75 BPM | WEIGHT: 200 LBS | BODY MASS INDEX: 35.44 KG/M2 | DIASTOLIC BLOOD PRESSURE: 78 MMHG

## 2022-08-11 DIAGNOSIS — J01.10 ACUTE FRONTAL SINUSITIS, RECURRENCE NOT SPECIFIED: ICD-10-CM

## 2022-08-11 DIAGNOSIS — U07.1 COVID: Primary | ICD-10-CM

## 2022-08-11 DIAGNOSIS — R51.9 ACUTE NONINTRACTABLE HEADACHE, UNSPECIFIED HEADACHE TYPE: ICD-10-CM

## 2022-08-11 LAB — SARS-COV-2, NAA: DETECTED

## 2022-08-11 PROCEDURE — 99215 OFFICE O/P EST HI 40 MIN: CPT

## 2022-08-11 PROCEDURE — 70450 CT HEAD/BRAIN W/O DYE: CPT

## 2022-08-11 PROCEDURE — 93005 ELECTROCARDIOGRAM TRACING: CPT | Performed by: STUDENT IN AN ORGANIZED HEALTH CARE EDUCATION/TRAINING PROGRAM

## 2022-08-11 PROCEDURE — 2580000003 HC RX 258: Performed by: STUDENT IN AN ORGANIZED HEALTH CARE EDUCATION/TRAINING PROGRAM

## 2022-08-11 PROCEDURE — 96375 TX/PRO/DX INJ NEW DRUG ADDON: CPT

## 2022-08-11 PROCEDURE — 87635 SARS-COV-2 COVID-19 AMP PRB: CPT

## 2022-08-11 PROCEDURE — 6370000000 HC RX 637 (ALT 250 FOR IP): Performed by: EMERGENCY MEDICINE

## 2022-08-11 PROCEDURE — 99284 EMERGENCY DEPT VISIT MOD MDM: CPT

## 2022-08-11 PROCEDURE — 6360000002 HC RX W HCPCS: Performed by: STUDENT IN AN ORGANIZED HEALTH CARE EDUCATION/TRAINING PROGRAM

## 2022-08-11 PROCEDURE — 96374 THER/PROPH/DIAG INJ IV PUSH: CPT

## 2022-08-11 RX ORDER — OXYMETAZOLINE HYDROCHLORIDE 0.05 G/100ML
1 SPRAY NASAL 2 TIMES DAILY
Status: DISCONTINUED | OUTPATIENT
Start: 2022-08-11 | End: 2022-08-11 | Stop reason: HOSPADM

## 2022-08-11 RX ORDER — KETOROLAC TROMETHAMINE 30 MG/ML
15 INJECTION, SOLUTION INTRAMUSCULAR; INTRAVENOUS ONCE
Status: COMPLETED | OUTPATIENT
Start: 2022-08-11 | End: 2022-08-11

## 2022-08-11 RX ORDER — 0.9 % SODIUM CHLORIDE 0.9 %
500 INTRAVENOUS SOLUTION INTRAVENOUS ONCE
Status: COMPLETED | OUTPATIENT
Start: 2022-08-11 | End: 2022-08-11

## 2022-08-11 RX ORDER — PRASUGREL 5 MG/1
TABLET, FILM COATED ORAL
COMMUNITY
Start: 2022-07-07

## 2022-08-11 RX ORDER — LOSARTAN POTASSIUM 50 MG/1
TABLET ORAL
COMMUNITY
Start: 2022-07-06

## 2022-08-11 RX ORDER — METOCLOPRAMIDE HYDROCHLORIDE 5 MG/ML
10 INJECTION INTRAMUSCULAR; INTRAVENOUS ONCE
Status: COMPLETED | OUTPATIENT
Start: 2022-08-11 | End: 2022-08-11

## 2022-08-11 RX ADMIN — SODIUM CHLORIDE 500 ML: 9 INJECTION, SOLUTION INTRAVENOUS at 14:54

## 2022-08-11 RX ADMIN — OXYMETAZOLINE HYDROCHLORIDE 1 SPRAY: 0.05 SPRAY NASAL at 15:50

## 2022-08-11 RX ADMIN — KETOROLAC TROMETHAMINE 15 MG: 30 INJECTION, SOLUTION INTRAMUSCULAR; INTRAVENOUS at 14:57

## 2022-08-11 RX ADMIN — METOCLOPRAMIDE 10 MG: 5 INJECTION, SOLUTION INTRAMUSCULAR; INTRAVENOUS at 14:57

## 2022-08-11 ASSESSMENT — ENCOUNTER SYMPTOMS
EYE REDNESS: 0
WHEEZING: 1
EYE DISCHARGE: 0
BACK PAIN: 0
EYES NEGATIVE: 1
CONSTIPATION: 0
NAUSEA: 1
SINUS PRESSURE: 1
RECTAL PAIN: 0
DIARRHEA: 0
FACIAL SWELLING: 0
COLOR CHANGE: 0
ANAL BLEEDING: 0
BLOOD IN STOOL: 0
ABDOMINAL PAIN: 0
EYE ITCHING: 0
TROUBLE SWALLOWING: 0
SORE THROAT: 1
STRIDOR: 0
ABDOMINAL DISTENTION: 0
VOMITING: 0
CHEST TIGHTNESS: 0
APNEA: 0
EYE PAIN: 0
SINUS PAIN: 1
CHOKING: 0
SORE THROAT: 0
SHORTNESS OF BREATH: 0
COUGH: 1

## 2022-08-11 ASSESSMENT — PAIN - FUNCTIONAL ASSESSMENT
PAIN_FUNCTIONAL_ASSESSMENT: 0-10
PAIN_FUNCTIONAL_ASSESSMENT: 0-10

## 2022-08-11 ASSESSMENT — PAIN DESCRIPTION - LOCATION
LOCATION: HEAD
LOCATION: HEAD

## 2022-08-11 ASSESSMENT — PAIN SCALES - GENERAL
PAINLEVEL_OUTOF10: 7
PAINLEVEL_OUTOF10: 8

## 2022-08-11 ASSESSMENT — PAIN DESCRIPTION - FREQUENCY: FREQUENCY: CONTINUOUS

## 2022-08-11 NOTE — ED NOTES
Covid nasal pharyngeal swab obtained and sent to lab. Proper ppe worn during procedure. Pt tolerated well.        Lucie Gambino LPN  98/08/62 0412

## 2022-08-11 NOTE — ED PROVIDER NOTES
[facility] ENCOUNTER          Pt Name: Jaxson Yoo  MRN: [id number]  [place] 1950  Date of evaluation: 8/11/2022  Treating Resident Physician: Nathalie Abbott MD  Supervising Physician: Selma Ledbetter, [address] [place] [address]       Chief Complaint   Patient presents with    Fatigue    Cough    Headache     History obtained from the patient. HISTORY OF PRESENT ILLNESS    HPI  Jaxson Yoo is a 66 y.o. female with PMHx of tension, GERD, diabetes who presents to the emergency department for evaluation of fatigue, cough, HA    Patient to ED due to evaluation of headache, fatigue, cough. Patient states that the symptoms started 3 days prior. Patient main concern is her bifrontal headache radiating to her occipital region. Patient states that she does also have some mild nausea but no vomiting. Patient woke up this morning with some dizziness which prompted her to seek care. Pt was seen at  earlier today and diagnosed with covid. Due to her headache, urgent care center to ED to rule out intracranial hemorrhage. The patient has no other acute complaints at this time. REVIEW OF SYSTEMS   Review of Systems   Constitutional:  Positive for appetite change and fatigue. Negative for activity change, diaphoresis and fever. HENT:  Positive for sinus pressure and sinus pain. Negative for ear pain, facial swelling, sore throat and trouble swallowing. Eyes:  Negative for pain, discharge, redness and itching. Respiratory:  Positive for cough. Negative for apnea, choking, chest tightness and shortness of breath. Cardiovascular:  Negative for chest pain, palpitations and leg swelling. Gastrointestinal:  Positive for nausea. Negative for abdominal distention, abdominal pain, anal bleeding, diarrhea, rectal pain and vomiting. Endocrine: Negative for polydipsia, polyphagia and polyuria.    Genitourinary:  Negative for dysuria, flank pain, genital sores and hematuria. Musculoskeletal:  Negative for arthralgias, back pain, joint swelling, myalgias, neck pain and neck stiffness. Skin:  Negative for color change, rash and wound. Neurological:  Positive for dizziness and headaches. Negative for syncope, facial asymmetry and speech difficulty. Hematological:  Negative for adenopathy. Psychiatric/Behavioral:  Negative for agitation, behavioral problems, hallucinations, self-injury and suicidal ideas. PAST MEDICAL AND SURGICAL HISTORY     Past Medical History:   Diagnosis Date    Arthritis     Asthma     seasonal    Depression     Fibromyalgia     GERD (gastroesophageal reflux disease)     Hx of blood clots 2003    DVT after arthroscopy right knee    Hyperlipidemia     Hypertension     Hypothyroidism     Restless legs syndrome     Type II or unspecified type diabetes mellitus without mention of complication, not stated as uncontrolled     Unspecified sleep apnea     no CPAP     Past Surgical History:   Procedure Laterality Date    BREAST LUMPECTOMY Right 10/17/2013    radial scar removal    CARDIAC CATHETERIZATION  2006??    results ok    COLONOSCOPY      last one 2014??    COLONOSCOPY  08/11/2017    Dr Lori Ho  feb.  2016    left cataract surgery    HYSTERECTOMY (CERVIX STATUS UNKNOWN)  1992    JOINT REPLACEMENT Right 2013    total knee replacement    KNEE ARTHROSCOPY Right 2003    NE BX OF BREAST,VACUUM ASST,IMAGE GUIDE Right 09/26/2013    Radial scar    SINUS SURGERY  1994    SKIN BIOPSY      TONSILLECTOMY  1955    UPPER GASTROINTESTINAL ENDOSCOPY  07/25/2017    Dr Bard Cyr  2003    casandra filter placed         MEDICATIONS     Current Facility-Administered Medications:     oxymetazoline (AFRIN) 0.05 % nasal spray 1 spray, 1 spray, Each Nostril, BID, Kavita Keys, DO, 1 spray at 08/11/22 1550    Current Outpatient Medications:     prasugrel (EFFIENT) 5 MG TABS, TAKE 1 TABLET BY MOUTH EVERY DAY, Disp: , Rfl: losartan (COZAAR) 50 MG tablet, TAKE 1 TABLET BY MOUTH EVERY DAY, Disp: , Rfl:     pantoprazole (PROTONIX) 40 MG tablet, Take 1 tablet by mouth every morning (before breakfast), Disp: 90 tablet, Rfl: 2    ondansetron (ZOFRAN ODT) 4 MG disintegrating tablet, Take 1 tablet by mouth every 8 hours as needed for Nausea or Vomiting, Disp: 12 tablet, Rfl: 0    acetaminophen (TYLENOL) 325 MG tablet, Take 650 mg by mouth every 6 hours as needed for Pain, Disp: , Rfl:     Multiple Vitamins-Minerals (EYE VITAMINS PO), Take by mouth daily, Disp: , Rfl:     MULTIPLE VITAMIN PO, Take by mouth daily, Disp: , Rfl:     Lactobacillus (ACIDOPHILUS PO), Take by mouth daily Indications: 1Billion 3 daily, Disp: , Rfl:     diphenoxylate-atropine (LOMOTIL) 2.5-0.025 MG per tablet, Take 1 tablet by mouth 4 times daily as needed for Diarrhea., Disp: , Rfl:     atorvastatin (LIPITOR) 10 MG tablet, Take 10 mg by mouth daily, Disp: , Rfl:     venlafaxine (EFFEXOR XR) 150 MG extended release capsule, Take 150 mg by mouth daily, Disp: , Rfl:     levothyroxine (LEVOTHROID) 25 MCG tablet, Take 25 mcg by mouth Daily, Disp: , Rfl:     sitaGLIPtan (JANUVIA) 100 MG tablet, Take 100 mg by mouth daily. , Disp: , Rfl:     atenolol (TENORMIN) 25 MG tablet, Take 25 mg by mouth daily. , Disp: , Rfl:     glimepiride (AMARYL) 1 MG tablet, Take 1 mg by mouth every morning (before breakfast). , Disp: , Rfl:       SOCIAL HISTORY     Social History     Social History Narrative    Not on file     Social History     Tobacco Use    Smoking status: Never    Smokeless tobacco: Never   Vaping Use    Vaping Use: Never used   Substance Use Topics    Alcohol use: Never     Alcohol/week: 0.0 standard drinks    Drug use: No         ALLERGIES     Allergies   Allergen Reactions    Cephalosporins Rash    Morphine Nausea And Vomiting         FAMILY HISTORY     Family History   Problem Relation Age of Onset    Arthritis Mother     Cancer Mother         lung    Diabetes Mother Heart Disease Father     High Blood Pressure Father     Thyroid Cancer Sister     Other Sister         graves disease    Breast Cancer Paternal Aunt          PREVIOUS RECORDS   Previous records reviewed: I reviewed the patient's past medical records including relevant labs, imaging and procedures. Patient was seen in urgent care earlier today for these presenting symptoms. PHYSICAL EXAM     ED Triage Vitals   BP Temp Temp Source Heart Rate Resp SpO2 Height Weight   08/11/22 1028 08/11/22 1028 08/11/22 1028 08/11/22 1028 08/11/22 1028 08/11/22 1028 08/11/22 1212 08/11/22 1212   (!) 152/70 99.4 °F (37.4 °C) Temporal 78 16 94 % 5' 3\" (1.6 m) 200 lb (90.7 kg)     Initial vital signs and nursing assessment reviewed and abnormal from hypertension . Body mass index is 35.43 kg/m². Pulsoximetry is normal per my interpretation. Additional Vital Signs:  Vitals:    08/11/22 1212   BP: (!) 140/78   Pulse: 75   Resp: 20   Temp: 99.1 °F (37.3 °C)   SpO2: 97%       Physical Exam  Constitutional:       General: She is not in acute distress. Appearance: She is ill-appearing. She is not diaphoretic. HENT:      Head: Normocephalic and atraumatic. Right Ear: External ear normal.      Left Ear: External ear normal.      Nose: Nose normal. No congestion or rhinorrhea. Mouth/Throat:      Mouth: Mucous membranes are moist.      Pharynx: No oropharyngeal exudate or posterior oropharyngeal erythema. Eyes:      General: No scleral icterus. Extraocular Movements: Extraocular movements intact. Conjunctiva/sclera: Conjunctivae normal.      Pupils: Pupils are equal, round, and reactive to light. Cardiovascular:      Rate and Rhythm: Normal rate and regular rhythm. Pulses: Normal pulses. Heart sounds: Normal heart sounds. Pulmonary:      Effort: Pulmonary effort is normal. No respiratory distress. Breath sounds: Normal breath sounds. Chest:      Chest wall: No tenderness.    Abdominal: General: Bowel sounds are normal. There is no distension. Palpations: Abdomen is soft. Tenderness: There is no abdominal tenderness. There is no guarding or rebound. Musculoskeletal:         General: No swelling, tenderness or deformity. Normal range of motion. Cervical back: Normal range of motion and neck supple. No rigidity or tenderness. Skin:     General: Skin is warm and dry. Capillary Refill: Capillary refill takes less than 2 seconds. Coloration: Skin is not jaundiced or pale. Findings: No bruising, erythema, lesion or rash. Neurological:      General: No focal deficit present. Mental Status: She is alert and oriented to person, place, and time. Psychiatric:         Mood and Affect: Mood normal.         Behavior: Behavior normal.         Thought Content: Thought content normal.       ED RESULTS   Laboratory results:  Labs Reviewed   COVID-19, RAPID - Abnormal; Notable for the following components:       Result Value    SARS-CoV-2, EDDIE DETECTED (*)     All other components within normal limits       Radiologic studies results:  CT Head WO Contrast   Final Result   Sphenoid sinusitis. No acute intracranial pathology otherwise            **This report has been created using voice recognition software. It may contain minor errors which are inherent in voice recognition technology. **      Final report electronically signed by Dr. Bear De Anda on 8/11/2022 2:12 PM          ED Medications administered this visit:   Medications   oxymetazoline (AFRIN) 0.05 % nasal spray 1 spray (1 spray Each Nostril Given 8/11/22 1550)   0.9 % sodium chloride bolus (0 mLs IntraVENous Stopped 8/11/22 1636)   ketorolac (TORADOL) injection 15 mg (15 mg IntraVENous Given 8/11/22 1457)   metoclopramide (REGLAN) injection 10 mg (10 mg IntraVENous Given 8/11/22 1457)         ED COURSE     ED Course as of 08/11/22 1646   Thu Aug 11, 2022   1332 SARS-CoV-2, EDDIE(!!): DETECTED [EL]   1455 CT Head WO Contrast  IMPRESSION:  Sphenoid sinusitis. No acute intracranial pathology otherwise    [EL]   1533 Pt states she does not want to receive tx with paxlovid and would rather let covid run its course. Offered to give pt risks and benefits for paxlovid but pt refusing. [EL]      ED Course User Index  [EL] Edmar Reed MD       MEDICAL DECISION MAKING   Initial Assessment:   68-year-old female chief complaint fatigue, headache, generalized weakness and fatigue. X3 days  Tested positive for COVID at urgent care    Given the patient's above chief complaint and findings on history and physical examination, I thought it was appropriate to consider the following emergency medical conditions:  COVID, flu, intracranial hemorrhage, viral illness, pneumonia, ACS    Although some of these diagnoses are unlikely they were considered in my medical decision making. 68-year-old female chief complaint fatigue, headache, generalized weakness and fatigue x3 days. Tested positive for COVID at urgent care. Came to ED for further evaluation of this headache and concern for intracranial hemorrhage. Patient is on Prasugrel. CT head without contrast in ED showing sphenoid sinusitis. Patient given IV fluids, Toradol, Reglan and Afrin in the ED. On repeat examination, patient with symptomatic relief. Patient SPO2 97 to 100% on room air. Physical exam with no wheezing, rhonchi. At this time, patient is stable for discharge. Spoke with patient about treatment with Paxlovid. Pt states she is vaccinated x3 for covid and would like to just let covid run its course. Patient states that she does have a home pulse ox. Advised patient to monitor her pulse ox. Given instructions for isolation, strict return precautions to ED if symptoms were to progress. Patient agreeable to plan.     MEDICATION CHANGES     Discharge Medication List as of 8/11/2022  4:34 PM            FINAL DISPOSITION     Final diagnoses:   COVID   Acute nonintractable headache, unspecified headache type   Acute frontal sinusitis, recurrence not specified     Condition: condition: stable  Dispo: Discharge to home      This transcription was electronically signed. Parts of this transcriptions may have been dictated by use of voice recognition software and electronically transcribed, and parts may have been transcribed with the assistance of an ED scribe. The transcription may contain errors not detected in proofreading. Please refer to my supervising physician's documentation if my documentation differs.     Electronically Signed: Todd Rodriguez MD, 08/11/22, 4:46 PM         Jaswant Short MD  Resident  08/11/22 2839

## 2022-08-11 NOTE — ED PROVIDER NOTES
Attending Supervising Physician's Attestation Statement  I performed a history and physical examination on the patient and discussed the management with the resident physician. I reviewed and agree with the findings and plan as documented in the resident physician note. This includes all diagnostic interpretations and treatment plans as written. I was present for the key portion of any procedures performed and the inclusive time noted in any critical care statement. Except as noted below. Brief H&P   This patient is a 66 y.o. Female with Headache. HA has been going on for the last 3 days. Frontal throbbing pressure. Rated 9/10. No WOL. It is associated with sinus pressure and nasal congestion. Patient DX with covid. She was seen at [facility] and sent to the ED for her HA. Patient states she is on effient and asa. On my examination, Resting comfortably. HEENT: Normocephalic, Atraumatic. Neck is supple without TTP. No meningeal signs. +PND and Sinus TTP. Lungs: Clear and equal bilaterally. No increased work of breathing or respiratory distress. Heart: Rate and rhythm regular, no murmurs clicks or gallops  Abdomen: Soft non-tender, and non-distended abdomen. No rigidity. No Guarding. Lower extremities: No edema, no tenderness to palpation. Neuro: Awake and alert, no lateralizing deficits, cranial nerves II through XII grossly intact bilaterally    Diagnostics and Treatments      LABS / RADIOLOGY:     Results for orders placed or performed during the hospital encounter of 08/11/22   COVID-19, Rapid   Result Value Ref Range    SARS-CoV-2, EDDIE DETECTED (AA) NOT DETECTED       CT Head WO Contrast   Final Result   Sphenoid sinusitis. No acute intracranial pathology otherwise            **This report has been created using voice recognition software. It may contain minor errors which are inherent in voice recognition technology. **      Final report electronically signed by Dr. Juan A Escobar on 8/11/2022 2:12 PM MEDICATIONS GIVEN:  Orders Placed This Encounter   Medications    0.9 % sodium chloride bolus    ketorolac (TORADOL) injection 15 mg    metoclopramide (REGLAN) injection 10 mg    oxymetazoline (AFRIN) 0.05 % nasal spray 1 spray       Medical Decision Making   Potential Diagnoses Considered: Sinusitis, URI, COVID PNA, ICH  Assessment and Plan   Sinus HA with URI. Afrin and symptomatic tx. Offer Paxlovid. CT was obtained due to age, headache, and blood thinners. It was negative. Please see the resident physician completed note for final disposition except as documented on this attestation. I have reviewed and interpreted all available lab, radiology and ekg results available at the moment. Diagnosis, treatment and disposition plans were discussed and agreed upon by patient and/or their family. This transcription was electronically signed. It was dictated by use of voice recognition software and electronically transcribed. The transcription may contain errors not detected in proofreading.        Electronically signed by Dianna Tidwell DO on 8/11/22 at 2:44 PM EDT      Dianna Tidwell DO  08/11/22 2690

## 2022-08-11 NOTE — ED PROVIDER NOTES
[facility]  UrgentCare Encounter      [address]       Chief Complaint   Patient presents with    Fatigue    Cough    Headache       Nurses Notes reviewed and I agree except as noted in the HPI. HISTORY OF PRESENT ILLNESS   Anthony Mejia is a 66 y.o. female who presents for evaluation of headache, fatigue, and cough. Symptoms started on Tuesday. She notes a frontal h/a radiating occipital with mild nausea. She woke with some dizziness which prompted her to seek care. REVIEW OF SYSTEMS     Review of Systems   Constitutional:  Positive for appetite change, chills, fatigue and fever. HENT:  Positive for congestion, postnasal drip, sinus pressure and sore throat. Eyes: Negative. Respiratory:  Positive for cough and wheezing. Negative for chest tightness and stridor. Cardiovascular:  Negative for chest pain, palpitations and leg swelling. Gastrointestinal:  Positive for nausea. Negative for abdominal distention, abdominal pain, anal bleeding, blood in stool and constipation. Neurological:  Positive for dizziness and headaches. Negative for tremors, syncope, facial asymmetry, speech difficulty, weakness, light-headedness and numbness. PAST MEDICAL HISTORY         Diagnosis Date    Arthritis     Asthma     seasonal    Depression     Fibromyalgia     GERD (gastroesophageal reflux disease)     Hx of blood clots 2003    DVT after arthroscopy right knee    Hyperlipidemia     Hypertension     Hypothyroidism     Restless legs syndrome     Type II or unspecified type diabetes mellitus without mention of complication, not stated as uncontrolled     Unspecified sleep apnea     no CPAP       SURGICAL HISTORY     Patient  has a past surgical history that includes sinus surgery (1994); Tonsillectomy (1955); Hysterectomy (1992); Knee arthroscopy (Right, 2003); Vena Cava Filter Placement (2003); Cardiac catheterization (2006??); Colonoscopy; joint replacement (Right, 2013); eye surgery (feb. 2016); skin biopsy; Upper gastrointestinal endoscopy (07/25/2017); Colonoscopy (08/11/2017); pr bx of breast,vacuum asst,image guide (Right, 09/26/2013); and Breast lumpectomy (Right, 10/17/2013). CURRENT MEDICATIONS       Previous Medications    ACETAMINOPHEN (TYLENOL) 325 MG TABLET    Take 650 mg by mouth every 6 hours as needed for Pain    ATENOLOL (TENORMIN) 25 MG TABLET    Take 25 mg by mouth daily. ATORVASTATIN (LIPITOR) 10 MG TABLET    Take 10 mg by mouth daily    DIPHENOXYLATE-ATROPINE (LOMOTIL) 2.5-0.025 MG PER TABLET    Take 1 tablet by mouth 4 times daily as needed for Diarrhea. GLIMEPIRIDE (AMARYL) 1 MG TABLET    Take 1 mg by mouth every morning (before breakfast). LACTOBACILLUS (ACIDOPHILUS PO)    Take by mouth daily Indications: 1Billion 3 daily    LEVOTHYROXINE (LEVOTHROID) 25 MCG TABLET    Take 25 mcg by mouth Daily    LOSARTAN (COZAAR) 50 MG TABLET    TAKE 1 TABLET BY MOUTH EVERY DAY    MULTIPLE VITAMIN PO    Take by mouth daily    MULTIPLE VITAMINS-MINERALS (EYE VITAMINS PO)    Take by mouth daily    ONDANSETRON (ZOFRAN ODT) 4 MG DISINTEGRATING TABLET    Take 1 tablet by mouth every 8 hours as needed for Nausea or Vomiting    PANTOPRAZOLE (PROTONIX) 40 MG TABLET    Take 1 tablet by mouth every morning (before breakfast)    PRASUGREL (EFFIENT) 5 MG TABS    TAKE 1 TABLET BY MOUTH EVERY DAY    SITAGLIPTAN (JANUVIA) 100 MG TABLET    Take 100 mg by mouth daily. VENLAFAXINE (EFFEXOR XR) 150 MG EXTENDED RELEASE CAPSULE    Take 150 mg by mouth daily       ALLERGIES     Patient is is allergic to cephalosporins and morphine. FAMILY HISTORY     Patient'sfamily history includes Arthritis in her mother; Breast Cancer in her paternal aunt; Cancer in her mother; Diabetes in her mother; Heart Disease in her father; High Blood Pressure in her father; Other in her sister; Thyroid Cancer in her sister. SOCIAL HISTORY     Patient  reports that she has never smoked.  She has never used smokeless tobacco. She reports that she does not drink alcohol and does not use drugs. PHYSICAL EXAM     ED TRIAGE VITALS  BP: (!) 152/70, Temp: 99.4 °F (37.4 °C), Heart Rate: 78, Resp: 16, SpO2: 94 %  Physical Exam  Constitutional:       General: She is not in acute distress. Appearance: Normal appearance. She is normal weight. She is ill-appearing. She is not toxic-appearing. HENT:      Head: Normocephalic and atraumatic. Right Ear: Tympanic membrane, ear canal and external ear normal.      Left Ear: Tympanic membrane, ear canal and external ear normal.      Nose: Nose normal.      Mouth/Throat:      Mouth: Mucous membranes are moist.      Pharynx: Posterior oropharyngeal erythema present. Eyes:      Pupils: Pupils are equal, round, and reactive to light. Cardiovascular:      Rate and Rhythm: Normal rate and regular rhythm. Pulses: Normal pulses. Heart sounds: Normal heart sounds. Pulmonary:      Effort: Pulmonary effort is normal.      Breath sounds: Normal breath sounds. Musculoskeletal:      Cervical back: Normal range of motion. No rigidity or tenderness. Lymphadenopathy:      Cervical: No cervical adenopathy. Skin:     General: Skin is warm and dry. Capillary Refill: Capillary refill takes less than 2 seconds. Neurological:      General: No focal deficit present. Mental Status: She is alert and oriented to person, place, and time. Mental status is at baseline. Sensory: No sensory deficit. Motor: No weakness.       Coordination: Coordination normal.      Gait: Gait normal.       DIAGNOSTIC RESULTS   Labs:   Results for orders placed or performed during the hospital encounter of 08/11/22   COVID-19, Rapid   Result Value Ref Range    SARS-CoV-2, EDDIE DETECTED (AA) NOT DETECTED       IMAGING:  No orders to display     URGENT CARE COURSE:     Vitals:    08/11/22 1028   BP: (!) 152/70   Pulse: 78   Resp: 16   Temp: 99.4 °F (37.4 °C)   TempSrc: Temporal   SpO2: 94% Medications - No data to display  PROCEDURES:  None  FINALIMPRESSION      1. COVID    2. Intractable headache, unspecified chronicity pattern, unspecified headache type        DISPOSITION/PLAN   DISPOSITION Decision To Transfer 08/11/2022 10:53:14 AM  Patient tested positive for Covid. Her headache is concerning. Recommend that she go over to ER for further examination.    PATIENT REFERRED TO:  [id number] [phone]      DISCHARGE MEDICATIONS:  New Prescriptions    No medications on file     Current Discharge Medication List          ANA Alvarado CNP, APRN - CNP  08/11/22 [address] ANA Holden CNP  08/11/22 [id number]

## 2022-08-11 NOTE — DISCHARGE INSTRUCTIONS
You are seen today in the emergency department for COVID, headache. Your head CT was negative for any acute bleeding. Your symptoms got better after treatment in the ED. Please use your home pulse ox to measure your oxygen. Please come back to the ED if your oxygen level runs below 90% on multiple occasions. Come back to the emergency room if you have any increase shortness of breath, chest pain, difficulty breathing. Please practice isolation. Read the following pamphlet regarding COVID isolation. Follow-up with your family medicine doctor regarding today's visit.

## 2022-08-12 ENCOUNTER — CARE COORDINATION (OUTPATIENT)
Dept: CARE COORDINATION | Age: 72
End: 2022-08-12

## 2022-08-12 LAB
EKG ATRIAL RATE: 73 BPM
EKG P AXIS: 58 DEGREES
EKG P-R INTERVAL: 142 MS
EKG Q-T INTERVAL: 398 MS
EKG QRS DURATION: 96 MS
EKG QTC CALCULATION (BAZETT): 438 MS
EKG R AXIS: -17 DEGREES
EKG T AXIS: 51 DEGREES
EKG VENTRICULAR RATE: 73 BPM

## 2022-08-12 PROCEDURE — 93010 ELECTROCARDIOGRAM REPORT: CPT | Performed by: INTERNAL MEDICINE

## 2022-08-12 NOTE — CARE COORDINATION
Patient contacted regarding COVID-19 diagnosis. Discussed COVID-19 related testing which was available at this time. Test results were positive. Patient informed of results, if available? Positive results reviewed with patient during our call. Ambulatory Care Manager contacted the patient by telephone to perform post discharge assessment. Call within 2 business days of discharge: Yes. Verified name and  with patient as identifiers. Provided introduction to self, and explanation of the CTN/ACM role, and reason for call due to risk factors for infection and/or exposure to COVID-19. Symptoms reviewed with patient who verbalized the following symptoms: fatigue  cough  no new symptoms  no worsening symptoms  Congestion and headache . Due to no new or worsening symptoms encounter was not routed to provider for escalation. Discussed follow-up appointments. If no appointment was previously scheduled, appointment scheduling offered: Patient will call and schedule PCP f/u on her own and she declines assistance. [facility] follow up appointment(s): No future appointments. Non-[facility] follow up appointment(s): Patient will call and schedule f/u on her own and she declines assistance with scheduling. Non-face-to-face services provided:  Obtained and reviewed discharge summary and/or continuity of care documents  Education of patient/family/caregiver/guardian to support self-management-COVID-19 education and zone information reviewed with patient. Patient was educated on signs/symptoms to report as well as the importance of early symptom recognition and follow up. ACM educated on need to self quarantine as directed and patient verbalized understanding. Advance Care Planning:   Does patient have an Advance Directive:  patient declined education. Educated patient about risk for severe COVID-19 due to risk factors according to CDC guidelines.  ACM reviewed discharge instructions, medical action plan and red

## 2022-10-03 ENCOUNTER — HOSPITAL ENCOUNTER (EMERGENCY)
Age: 72
Discharge: HOME | End: 2022-10-03
Payer: MEDICARE

## 2022-10-03 VITALS
BODY MASS INDEX: 35.44 KG/M2 | SYSTOLIC BLOOD PRESSURE: 145 MMHG | HEART RATE: 66 BPM | WEIGHT: 200 LBS | HEIGHT: 63 IN | TEMPERATURE: 97.3 F | DIASTOLIC BLOOD PRESSURE: 57 MMHG | RESPIRATION RATE: 16 BRPM | OXYGEN SATURATION: 97 %

## 2022-10-03 DIAGNOSIS — S80.862A FLEA BITE OF LEFT LOWER LEG, INITIAL ENCOUNTER: Primary | ICD-10-CM
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
## 2022-10-03 DIAGNOSIS — S80.861A: ICD-10-CM
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
## 2022-10-03 PROCEDURE — 99213 OFFICE O/P EST LOW 20 MIN: CPT | Performed by: NURSE PRACTITIONER

## 2022-10-03 PROCEDURE — 99213 OFFICE O/P EST LOW 20 MIN: CPT

## 2022-10-03 RX ORDER — BETAMETHASONE DIPROPIONATE 0.05 %
OINTMENT (GRAM) TOPICAL
Qty: 15 G | Refills: 0 | Status: SHIPPED | OUTPATIENT
Start: 2022-10-03 | End: 2022-10-15

## 2022-10-03 ASSESSMENT — ENCOUNTER SYMPTOMS
COUGH: 0
EYE REDNESS: 0
EYE ITCHING: 0
VOMITING: 0
NAUSEA: 0
ABDOMINAL PAIN: 0
DIARRHEA: 0
SHORTNESS OF BREATH: 0

## 2022-10-03 ASSESSMENT — PAIN - FUNCTIONAL ASSESSMENT: PAIN_FUNCTIONAL_ASSESSMENT: NONE - DENIES PAIN

## 2022-10-03 NOTE — ED NOTES
Patient verbalized understanding of discharge instructions. Denies questions or concerns at this time.       Asad Bethea RN  10/03/22 0614

## 2022-10-03 NOTE — ED TRIAGE NOTES
Pt presents to [facility] with c/o red itchy bug bites on both legs, both arms for past 2 weeks.

## 2022-10-03 NOTE — ED PROVIDER NOTES
[address] E[address]       Chief Complaint   Patient presents with    Insect Bite       Nurses Notes reviewed and I agree except as noted in the HPI. HISTORY OF PRESENT ILLNESS   Kelly Rutledge is a 74 y.o. female who presents for evaluation. She states that she has flea bites on her lower legs. She states that she has 2 dogs that have had fleas, they have been treated, and her home has been treated. She continues to have intense itching and over-the-counter cortisone cream is not improving symptoms. The patient/patient representative has no other acute complaints at this time. REVIEW OF SYSTEMS     Review of Systems   Constitutional:  Negative for chills, fatigue and fever. Eyes:  Negative for redness and itching. Respiratory:  Negative for cough and shortness of breath. Cardiovascular:  Negative for chest pain. Gastrointestinal:  Negative for abdominal pain, diarrhea, nausea and vomiting. Skin:  Positive for rash. Allergic/Immunologic: Negative for environmental allergies and food allergies. Neurological:  Negative for headaches. PAST MEDICAL HISTORY         Diagnosis Date    Arthritis     Asthma     seasonal    Depression     Fibromyalgia     GERD (gastroesophageal reflux disease)     Hx of blood clots 2003    DVT after arthroscopy right knee    Hyperlipidemia     Hypertension     Hypothyroidism     Restless legs syndrome     Type II or unspecified type diabetes mellitus without mention of complication, not stated as uncontrolled     Unspecified sleep apnea     no CPAP       SURGICAL HISTORY     Patient  has a past surgical history that includes sinus surgery (1994); Tonsillectomy (1955); Hysterectomy (1992); Knee arthroscopy (Right, 2003); Vena Cava Filter Placement (2003); Cardiac catheterization (2006??); Colonoscopy; joint replacement (Right, 2013); eye surgery (feb. 2016); skin biopsy;  Upper gastrointestinal endoscopy (07/25/2017); Colonoscopy (08/11/2017); pr bx of breast,vacuum asst,image guide (Right, 09/26/2013); and Breast lumpectomy (Right, 10/17/2013). CURRENT MEDICATIONS       Discharge Medication List as of 10/3/2022  2:05 PM        CONTINUE these medications which have NOT CHANGED    Details   prasugrel (EFFIENT) 5 MG TABS TAKE 1 TABLET BY MOUTH EVERY DAYHistorical Med      losartan (COZAAR) 50 MG tablet TAKE 1 TABLET BY MOUTH EVERY DAYHistorical Med      pantoprazole (PROTONIX) 40 MG tablet Take 1 tablet by mouth every morning (before breakfast), Disp-90 tablet, R-2Normal      ondansetron (ZOFRAN ODT) 4 MG disintegrating tablet Take 1 tablet by mouth every 8 hours as needed for Nausea or Vomiting, Disp-12 tablet, R-0Normal      acetaminophen (TYLENOL) 325 MG tablet Take 650 mg by mouth every 6 hours as needed for PainHistorical Med      Multiple Vitamins-Minerals (EYE VITAMINS PO) Take by mouth dailyHistorical Med      MULTIPLE VITAMIN PO Take by mouth dailyHistorical Med      Lactobacillus (ACIDOPHILUS PO) Take by mouth daily Indications: 1Billion 3 dailyHistorical Med      diphenoxylate-atropine (LOMOTIL) 2.5-0.025 MG per tablet Take 1 tablet by mouth 4 times daily as needed for Diarrhea. Historical Med      atorvastatin (LIPITOR) 10 MG tablet Take 10 mg by mouth dailyHistorical Med      venlafaxine (EFFEXOR XR) 150 MG extended release capsule Take 150 mg by mouth dailyHistorical Med      levothyroxine (LEVOTHROID) 25 MCG tablet Take 25 mcg by mouth DailyHistorical Med      sitaGLIPtan (JANUVIA) 100 MG tablet Take 100 mg by mouth daily. atenolol (TENORMIN) 25 MG tablet Take 25 mg by mouth daily. glimepiride (AMARYL) 1 MG tablet Take 1 mg by mouth every morning (before breakfast). ALLERGIES     Patient is is allergic to cephalosporins and morphine. FAMILY HISTORY     Patient'sfamily history includes Arthritis in her mother; Breast Cancer in her paternal aunt;  Cancer in her mother; Diabetes in her mother; Heart Disease in her father; High Blood Pressure in her father; Other in her sister; Thyroid Cancer in her sister. SOCIAL HISTORY     Patient  reports that she has never smoked. She has never used smokeless tobacco. She reports that she does not drink alcohol and does not use drugs. PHYSICAL EXAM     ED TRIAGE VITALS  BP: (!) 145/57, Temp: 97.3 °F (36.3 °C), Heart Rate: 66, Resp: 16, SpO2: 97 %  Physical Exam  Vitals and nursing note reviewed. Constitutional:       General: She is not in acute distress. Appearance: Normal appearance. She is well-developed and well-groomed. HENT:      Head: Normocephalic and atraumatic. Right Ear: External ear normal.      Left Ear: External ear normal.      Nose: Nose normal.      Mouth/Throat:      Lips: Pink. Mouth: Mucous membranes are moist.   Eyes:      Conjunctiva/sclera: Conjunctivae normal.      Right eye: Right conjunctiva is not injected. Left eye: Left conjunctiva is not injected. Pupils: Pupils are equal.   Cardiovascular:      Rate and Rhythm: Normal rate. Heart sounds: Normal heart sounds. Pulmonary:      Effort: Pulmonary effort is normal. No respiratory distress. Breath sounds: Normal breath sounds. Musculoskeletal:      Cervical back: Normal range of motion. Skin:     General: Skin is warm and dry. Findings: Rash present. Comments: Bilateral lower extremities which appear to have erythematous insect bites. No evidence of secondary infection. No bleeding no drainage. Neurological:      Mental Status: She is alert and oriented to person, place, and time. Gait: Gait is intact. Psychiatric:         Mood and Affect: Mood normal.         Speech: Speech normal.         Behavior: Behavior is cooperative.        DIAGNOSTIC RESULTS   Labs:  Abnormal Labs Reviewed - No data to display     IMAGING:  No orders to display     URGENT CARE COURSE:     Vitals:    10/03/22 1350   BP: (!) 145/57 Pulse: 66   Resp: 16   Temp: 97.3 °F (36.3 °C)   TempSrc: Temporal   SpO2: 97%   Weight: 200 lb (90.7 kg)   Height: 5' 3\" (1.6 m)       Medications - No data to display  PROCEDURES:  FINALIMPRESSION      1. Flea bite of left lower leg, initial encounter    2. Flea bite of right lower leg, initial encounter        DISPOSITION/PLAN   DISPOSITION Decision To Discharge 10/03/2022 02:03:04 PM      Physical assessment findings, diagnostic testing(s) if applicable, and vital signs reviewed with patient/patient representative. Differential diagnosis(s) discussed with patient/patient representative. Prescription medications and/or over-the-counter medications for symptom management discussed. Patient is to follow-up with family care provider in 2-3 days if no improvement. If symptoms should worsen or change, go to the ED. Patient/patient representative is aware of care plan, questions answered, verbalizes understanding and is in agreement. Printed instructions attached to after visit summary. If COVID-19 positive or COVID-19 by PCR is pending at time of discharge patient is to quarantine/[facility]. Problem List Items Addressed This Visit    None  Visit Diagnoses       Flea bite of left lower leg, initial encounter    -  Primary    Relevant Medications    betamethasone dipropionate 0.05 % ointment    Flea bite of right lower leg, initial encounter        Relevant Medications    betamethasone dipropionate 0.05 % ointment              PATIENT REFERRED TO:  MD Pasquale Contreras [address] [place] [address]    Schedule an appointment as soon as possible for a visit in 3 days  For further evaluation. , If symptoms change/worsen, go to the [address] [place] [address], APRN - CNP    Please note that some or all of this chart was generated using [employer] voice recognition software.  Although every effort was made to ensure the accuracy of this automated transcription, some errors in transcription may have occurred.         Brandan Gallegos, ANA - CHAYA  10/03/22 1541

## 2022-10-15 ENCOUNTER — HOSPITAL ENCOUNTER (EMERGENCY)
Age: 72
Discharge: HOME | End: 2022-10-15
Payer: MEDICARE

## 2022-10-15 VITALS
OXYGEN SATURATION: 97 % | SYSTOLIC BLOOD PRESSURE: 139 MMHG | BODY MASS INDEX: 35.44 KG/M2 | WEIGHT: 200 LBS | HEART RATE: 70 BPM | TEMPERATURE: 98.1 F | RESPIRATION RATE: 18 BRPM | HEIGHT: 63 IN | DIASTOLIC BLOOD PRESSURE: 74 MMHG

## 2022-10-15 DIAGNOSIS — J06.9 VIRAL UPPER RESPIRATORY TRACT INFECTION: Primary | ICD-10-CM

## 2022-10-15 LAB — SARS-COV-2, NAA: NOT  DETECTED

## 2022-10-15 PROCEDURE — 99213 OFFICE O/P EST LOW 20 MIN: CPT | Performed by: NURSE PRACTITIONER

## 2022-10-15 PROCEDURE — 87635 SARS-COV-2 COVID-19 AMP PRB: CPT

## 2022-10-15 PROCEDURE — 99213 OFFICE O/P EST LOW 20 MIN: CPT

## 2022-10-15 RX ORDER — CETIRIZINE HYDROCHLORIDE 10 MG/1
10 TABLET ORAL DAILY
Qty: 30 TABLET | Refills: 0 | Status: SHIPPED | OUTPATIENT
Start: 2022-10-15 | End: 2022-11-14

## 2022-10-15 RX ORDER — METHYLPREDNISOLONE 4 MG/1
TABLET ORAL
Qty: 1 KIT | Refills: 0 | Status: SHIPPED | OUTPATIENT
Start: 2022-10-15 | End: 2022-10-21

## 2022-10-15 ASSESSMENT — ENCOUNTER SYMPTOMS
EYE ITCHING: 0
EYE PAIN: 0
GASTROINTESTINAL NEGATIVE: 1
RHINORRHEA: 1
SINUS PRESSURE: 1
COUGH: 1
SINUS PAIN: 1
EYE REDNESS: 0

## 2022-10-15 ASSESSMENT — PAIN - FUNCTIONAL ASSESSMENT: PAIN_FUNCTIONAL_ASSESSMENT: NONE - DENIES PAIN

## 2022-10-15 NOTE — ED TRIAGE NOTES
Pt to [facility] ambulatory with a headache, fatigue, URI s/s, and cough. This started on Wednesday.   Pt states she would like a COVID-19 test.

## 2022-10-15 NOTE — ED PROVIDER NOTES
[address]ne Ewing       Chief Complaint   Patient presents with    Headache    Fatigue    URI    Cough       Nurses Notes reviewed and I agree except as noted in the HPI. HISTORY OF PRESENT ILLNESS   Flakita Villalobos is a 74 y.o. female who presents The history is provided by the patient. URI  Presenting symptoms: congestion, cough, fatigue, fever and rhinorrhea    Congestion:     Location:  Nasal    Interferes with sleep: yes      Interferes with eating/drinking: yes    Rhinorrhea:     Quality:  Clear and yellow    Severity:  Moderate    Duration:  2 days    Timing:  Intermittent    Progression:  Worsening  Severity:  Moderate  Onset quality:  Sudden  Duration:  2 days  Timing:  Intermittent  Progression:  Worsening  Chronicity:  New  Relieved by:  Nothing  Worsened by:  Certain positions, drinking, eating and movement  Ineffective treatments:  OTC medications and rest  Associated symptoms: headaches, myalgias and sinus pain    Risk factors: being elderly, recent illness and sick contacts        REVIEW OF SYSTEMS     Review of Systems   Constitutional:  Positive for activity change, appetite change, fatigue and fever. HENT:  Positive for congestion, postnasal drip, rhinorrhea, sinus pressure and sinus pain. Eyes:  Negative for pain, redness and itching. Respiratory:  Positive for cough. Cardiovascular:  Negative for chest pain and leg swelling. Gastrointestinal: Negative. Endocrine: Negative for cold intolerance and heat intolerance. Genitourinary: Negative. Musculoskeletal:  Positive for myalgias. Skin:  Positive for pallor. Allergic/Immunologic: Positive for environmental allergies. Neurological:  Positive for headaches. Negative for weakness, light-headedness and numbness. Hematological:  Negative for adenopathy. Does not bruise/bleed easily. Psychiatric/Behavioral: Negative.        PAST MEDICAL HISTORY         Diagnosis Date    Arthritis     Asthma     seasonal    Depression     Fibromyalgia     GERD (gastroesophageal reflux disease)     Hx of blood clots 2003    DVT after arthroscopy right knee    Hyperlipidemia     Hypertension     Hypothyroidism     Restless legs syndrome     Type II or unspecified type diabetes mellitus without mention of complication, not stated as uncontrolled     Unspecified sleep apnea     no CPAP       SURGICAL HISTORY     Patient  has a past surgical history that includes sinus surgery (1994); Tonsillectomy (1955); Hysterectomy (1992); Knee arthroscopy (Right, 2003); Vena Cava Filter Placement (2003); Cardiac catheterization (2006??); Colonoscopy; joint replacement (Right, 2013); eye surgery (feb. 2016); skin biopsy; Upper gastrointestinal endoscopy (07/25/2017); Colonoscopy (08/11/2017); pr bx of breast,vacuum asst,image guide (Right, 09/26/2013); and Breast lumpectomy (Right, 10/17/2013).     CURRENT MEDICATIONS       Discharge Medication List as of 10/15/2022  2:09 PM        CONTINUE these medications which have NOT CHANGED    Details   prasugrel (EFFIENT) 5 MG TABS TAKE 1 TABLET BY MOUTH EVERY DAYHistorical Med      losartan (COZAAR) 50 MG tablet TAKE 1 TABLET BY MOUTH EVERY DAYHistorical Med      pantoprazole (PROTONIX) 40 MG tablet Take 1 tablet by mouth every morning (before breakfast), Disp-90 tablet, R-2Normal      ondansetron (ZOFRAN ODT) 4 MG disintegrating tablet Take 1 tablet by mouth every 8 hours as needed for Nausea or Vomiting, Disp-12 tablet, R-0Normal      acetaminophen (TYLENOL) 325 MG tablet Take 650 mg by mouth every 6 hours as needed for PainHistorical Med      Multiple Vitamins-Minerals (EYE VITAMINS PO) Take by mouth dailyHistorical Med      MULTIPLE VITAMIN PO Take by mouth dailyHistorical Med      Lactobacillus (ACIDOPHILUS PO) Take by mouth daily Indications: 1Billion 3 dailyHistorical Med      diphenoxylate-atropine (LOMOTIL) 2.5-0.025 MG per tablet Take 1 tablet by mouth 4 times daily as needed for Diarrhea. Historical Med      atorvastatin (LIPITOR) 10 MG tablet Take 10 mg by mouth dailyHistorical Med      venlafaxine (EFFEXOR XR) 150 MG extended release capsule Take 150 mg by mouth dailyHistorical Med      levothyroxine (SYNTHROID) 25 MCG tablet Take 25 mcg by mouth DailyHistorical Med      sitaGLIPtan (JANUVIA) 100 MG tablet Take 100 mg by mouth daily. atenolol (TENORMIN) 25 MG tablet Take 25 mg by mouth daily. glimepiride (AMARYL) 1 MG tablet Take 1 mg by mouth every morning (before breakfast). ALLERGIES     Patient is is allergic to cephalosporins and morphine. FAMILY HISTORY     Patient'sfamily history includes Arthritis in her mother; Breast Cancer in her paternal aunt; Cancer in her mother; Diabetes in her mother; Heart Disease in her father; High Blood Pressure in her father; Other in her sister; Thyroid Cancer in her sister. SOCIAL HISTORY     Patient  reports that she has never smoked. She has never used smokeless tobacco. She reports that she does not drink alcohol and does not use drugs. PHYSICAL EXAM     ED TRIAGE VITALS  BP: 139/74, Temp: 98.1 °F (36.7 °C), Heart Rate: 70, Resp: 18, SpO2: 97 %  Physical Exam  Vitals and nursing note reviewed. Constitutional:       General: She is not in acute distress. Appearance: Normal appearance. She is normal weight. She is not ill-appearing. HENT:      Head: Normocephalic. Right Ear: Ear canal and external ear normal.      Left Ear: Ear canal and external ear normal.      Nose: Congestion and rhinorrhea (yellow) present. Mouth/Throat:      Mouth: Mucous membranes are moist.      Pharynx: No posterior oropharyngeal erythema. Eyes:      Conjunctiva/sclera: Conjunctivae normal.   Cardiovascular:      Rate and Rhythm: Normal rate and regular rhythm. Pulses: Normal pulses. Heart sounds: Normal heart sounds.    Pulmonary:      Effort: Pulmonary effort is normal.      Breath sounds: Normal breath sounds. No wheezing or rhonchi. Abdominal:      General: Abdomen is flat. Palpations: Abdomen is soft. Musculoskeletal:         General: Normal range of motion. Cervical back: Normal range of motion and neck supple. Tenderness present. Lymphadenopathy:      Cervical: No cervical adenopathy. Skin:     General: Skin is warm and dry. Capillary Refill: Capillary refill takes less than 2 seconds. Coloration: Skin is not pale. Neurological:      General: No focal deficit present. Mental Status: She is alert and oriented to person, place, and time. Mental status is at baseline. Psychiatric:         Mood and Affect: Mood normal.         Behavior: Behavior normal.         Thought Content: Thought content normal.       DIAGNOSTIC RESULTS   Labs:  Results for orders placed or performed during the hospital encounter of 10/15/22   COVID-19, Rapid   Result Value Ref Range    SARS-CoV-2, EDDIE NOT  DETECTED NOT DETECTED       IMAGING:  No orders to display     URGENT CARE COURSE:         Medications - No data to display  PROCEDURES:  FINALIMPRESSION    I have reviewed the patient's medical history in detail and updated the computerized patient record. HPI/ROS per the patient and caregiver. Overall non toxic in appearance. Answers questions appropriately. Conditions discussed and addressed this visit include:   Patient appears ill but non-toxic and well hydrated. Covid testing is negative. Symptoms or mild. Patient is to take medications as directed. Continue all home medications. Potential side effects of the medications were reviewed with the patient in detail. The patient can increase fluids. The patient can have Tylenol or Motrin for pain and fever as needed. Discussed with patient signs and symptoms that would require emergent evaluation and treatment. The patient will follow-up with their family physician or go to the ER if they develop any worsening symptoms. The patient was discharged in stable condition      1.  Viral upper respiratory tract infection        DISPOSITION/PLAN   DISPOSITION Decision To Discharge 10/15/2022 02:07:25 PM    PATIENT REFERRED TO:  Dandre Mitchell, [address] [address]  [phone]        DISCHARGE MEDICATIONS:  Discharge Medication List as of 10/15/2022  2:09 PM        START taking these medications    Details   cetirizine (ZYRTEC) 10 MG tablet Take 1 tablet by mouth daily, Disp-30 tablet, R-0Normal      methylPREDNISolone (MEDROL, AKUA,) 4 MG tablet Take by mouth., Disp-1 kit, R-0Normal           Discharge Medication List as of 10/15/2022  2:09 PM          ANA Orantes CNP, APRN - CNP  10/15/22 5548

## 2022-11-14 RX ORDER — CETIRIZINE HYDROCHLORIDE 10 MG/1
TABLET ORAL
Qty: 30 TABLET | Refills: 0 | OUTPATIENT
Start: 2022-11-14

## 2023-01-11 ENCOUNTER — HOSPITAL ENCOUNTER (EMERGENCY)
Age: 73
Discharge: HOME | End: 2023-01-11
Payer: MEDICARE

## 2023-01-11 VITALS
WEIGHT: 200 LBS | OXYGEN SATURATION: 98 % | RESPIRATION RATE: 16 BRPM | HEIGHT: 63 IN | BODY MASS INDEX: 35.44 KG/M2 | DIASTOLIC BLOOD PRESSURE: 74 MMHG | HEART RATE: 74 BPM | TEMPERATURE: 97.1 F | SYSTOLIC BLOOD PRESSURE: 136 MMHG

## 2023-01-11 DIAGNOSIS — L30.4 INTERTRIGO: Primary | ICD-10-CM

## 2023-01-11 PROCEDURE — 99213 OFFICE O/P EST LOW 20 MIN: CPT

## 2023-01-11 PROCEDURE — 99213 OFFICE O/P EST LOW 20 MIN: CPT | Performed by: NURSE PRACTITIONER

## 2023-01-11 RX ORDER — CLINDAMYCIN HYDROCHLORIDE 300 MG/1
300 CAPSULE ORAL 3 TIMES DAILY
Qty: 15 CAPSULE | Refills: 0 | Status: SHIPPED | OUTPATIENT
Start: 2023-01-11 | End: 2023-01-16

## 2023-01-11 RX ORDER — NYSTATIN 10B UNIT
1 POWDER (EA) MISCELLANEOUS 2 TIMES DAILY
Qty: 1 EACH | Refills: 0 | Status: SHIPPED | OUTPATIENT
Start: 2023-01-11 | End: 2023-02-01

## 2023-01-11 RX ORDER — KETOCONAZOLE 20 MG/G
CREAM TOPICAL 2 TIMES DAILY
Qty: 30 G | Refills: 1 | Status: SHIPPED | OUTPATIENT
Start: 2023-01-11 | End: 2023-02-01

## 2023-01-11 ASSESSMENT — ENCOUNTER SYMPTOMS
ABDOMINAL PAIN: 0
VOMITING: 0
SINUS PAIN: 0
SINUS PRESSURE: 0
CONSTIPATION: 0
NAUSEA: 0
DIARRHEA: 0
EYES NEGATIVE: 1
RHINORRHEA: 0
WHEEZING: 0
SORE THROAT: 0
SHORTNESS OF BREATH: 0

## 2023-01-11 ASSESSMENT — PAIN DESCRIPTION - ORIENTATION: ORIENTATION: LEFT;LOWER

## 2023-01-11 ASSESSMENT — PAIN - FUNCTIONAL ASSESSMENT: PAIN_FUNCTIONAL_ASSESSMENT: 0-10

## 2023-01-11 ASSESSMENT — PAIN SCALES - GENERAL: PAINLEVEL_OUTOF10: 8

## 2023-01-11 ASSESSMENT — PAIN DESCRIPTION - LOCATION: LOCATION: ABDOMEN

## 2023-01-11 NOTE — DISCHARGE INSTRUCTIONS
Apply prescribed cream followed by prescribed powder. Follow-up with your primary care provider if there is no improvement.   Report to ER with new or severe symptoms

## 2023-01-11 NOTE — ED PROVIDER NOTES
[address]       Chief Complaint   Patient presents with    Rash     Left lower abdominalfold red inflammed       Nurses Notes reviewed and I agree except as noted in the HPI. HISTORY OF PRESENT ILLNESS   Modena Dandy is a 74 y.o. female who presents here today complaining of a rash in her abdominal skin fold that is red and inflamed. She states that it is itchy and painful. She states that she has had episodes similar to this in the past.    REVIEW OF SYSTEMS     Review of Systems   Constitutional:  Negative for chills, fatigue, fever and unexpected weight change. HENT:  Negative for congestion, postnasal drip, rhinorrhea, sinus pressure, sinus pain, sneezing and sore throat. Eyes: Negative. Respiratory:  Negative for shortness of breath and wheezing. Cardiovascular:  Negative for chest pain. Gastrointestinal:  Negative for abdominal pain, constipation, diarrhea, nausea and vomiting. Endocrine: Negative. Genitourinary:  Negative for difficulty urinating, dyspareunia, dysuria, hematuria, urgency, vaginal bleeding, vaginal discharge and vaginal pain. Musculoskeletal:  Negative for myalgias. Skin:  Positive for rash. Neurological:  Negative for dizziness, light-headedness and headaches. Hematological:  Negative for adenopathy. Psychiatric/Behavioral:  Negative for agitation.       PAST MEDICAL HISTORY         Diagnosis Date    Arthritis     Asthma     seasonal    Depression     Fibromyalgia     GERD (gastroesophageal reflux disease)     Hx of blood clots 2003    DVT after arthroscopy right knee    Hyperlipidemia     Hypertension     Hypothyroidism     Restless legs syndrome     Type II or unspecified type diabetes mellitus without mention of complication, not stated as uncontrolled     Unspecified sleep apnea     no CPAP       SURGICAL HISTORY     Patient  has a past surgical history that includes sinus surgery (1994); Tonsillectomy (1955); Hysterectomy (1992); Knee arthroscopy (Right, 2003); Vena Cava Filter Placement (2003); Cardiac catheterization (2006??); Colonoscopy; joint replacement (Right, 2013); eye surgery (feb. 2016); skin biopsy; Upper gastrointestinal endoscopy (07/25/2017); Colonoscopy (08/11/2017); pr bx of breast,vacuum asst,image guide (Right, 09/26/2013); and Breast lumpectomy (Right, 10/17/2013). CURRENT MEDICATIONS       Discharge Medication List as of 1/11/2023 11:56 AM        CONTINUE these medications which have NOT CHANGED    Details   UNABLE TO FIND Unkers ointmentHistorical Med      prasugrel (EFFIENT) 5 MG TABS TAKE 1 TABLET BY MOUTH EVERY DAYHistorical Med      losartan (COZAAR) 50 MG tablet TAKE 1 TABLET BY MOUTH EVERY DAYHistorical Med      pantoprazole (PROTONIX) 40 MG tablet Take 1 tablet by mouth every morning (before breakfast), Disp-90 tablet, R-2Normal      ondansetron (ZOFRAN ODT) 4 MG disintegrating tablet Take 1 tablet by mouth every 8 hours as needed for Nausea or Vomiting, Disp-12 tablet, R-0Normal      acetaminophen (TYLENOL) 325 MG tablet Take 650 mg by mouth every 6 hours as needed for PainHistorical Med      Multiple Vitamins-Minerals (EYE VITAMINS PO) Take by mouth dailyHistorical Med      MULTIPLE VITAMIN PO Take by mouth dailyHistorical Med      Lactobacillus (ACIDOPHILUS PO) Take by mouth daily Indications: 1Billion 3 dailyHistorical Med      diphenoxylate-atropine (LOMOTIL) 2.5-0.025 MG per tablet Take 1 tablet by mouth 4 times daily as needed for Diarrhea. Historical Med      atorvastatin (LIPITOR) 10 MG tablet Take 10 mg by mouth dailyHistorical Med      venlafaxine (EFFEXOR XR) 150 MG extended release capsule Take 150 mg by mouth dailyHistorical Med      levothyroxine (SYNTHROID) 25 MCG tablet Take 25 mcg by mouth DailyHistorical Med      sitaGLIPtan (JANUVIA) 100 MG tablet Take 100 mg by mouth daily. atenolol (TENORMIN) 25 MG tablet Take 25 mg by mouth daily. glimepiride (AMARYL) 1 MG tablet Take 1 mg by mouth every morning (before breakfast). ALLERGIES     Patient is is allergic to cephalosporins and morphine. FAMILY HISTORY     Patient'sfamily history includes Arthritis in her mother; Breast Cancer in her paternal aunt; Cancer in her mother; Diabetes in her mother; Heart Disease in her father; High Blood Pressure in her father; Other in her sister; Thyroid Cancer in her sister. SOCIAL HISTORY     Patient  reports that she has never smoked. She has never used smokeless tobacco. She reports that she does not drink alcohol and does not use drugs. PHYSICAL EXAM     ED TRIAGE VITALS  BP: 136/74, Temp: 97.1 °F (36.2 °C), Heart Rate: 74, Resp: 16, SpO2: 98 %  Physical Exam  Vitals and nursing note reviewed. Constitutional:       General: She is not in acute distress. Appearance: Normal appearance. She is not ill-appearing or toxic-appearing. HENT:      Head: Normocephalic and atraumatic. Nose: No congestion or rhinorrhea. Mouth/Throat:      Mouth: Mucous membranes are moist.      Pharynx: Oropharynx is clear. Eyes:      Extraocular Movements: Extraocular movements intact. Conjunctiva/sclera: Conjunctivae normal.      Pupils: Pupils are equal, round, and reactive to light. Cardiovascular:      Rate and Rhythm: Normal rate and regular rhythm. Pulses: Normal pulses. Heart sounds: Normal heart sounds. No murmur heard. Pulmonary:      Effort: Pulmonary effort is normal. No respiratory distress. Breath sounds: Normal breath sounds. No wheezing. Abdominal:      General: Abdomen is flat. Palpations: Abdomen is soft. Tenderness: There is no abdominal tenderness. Musculoskeletal:         General: No swelling or deformity. Normal range of motion. Cervical back: Normal range of motion and neck supple. Skin:     General: Skin is warm and dry.       Capillary Refill: Capillary refill takes less than 2 seconds. Findings: No rash. Comments: Weeping, excoriated, erythematous area beneath the left lower abdominal fold. Neurological:      General: No focal deficit present. Mental Status: She is alert and oriented to person, place, and time. Mental status is at baseline. Psychiatric:         Mood and Affect: Mood normal.         Behavior: Behavior normal.         Thought Content: Thought content normal.         Judgment: Judgment normal.       DIAGNOSTIC RESULTS   Labs:No results found for this visit on 01/11/23. IMAGING:  No orders to display     URGENT CARE COURSE:         Medications - No data to display  PROCEDURES:  FINALIMPRESSION      1. Intertrigo        DISPOSITION/PLAN   DISPOSITION Decision To Discharge 01/11/2023 11:49:14 AM    Start on nystatin powder as well as ketoconazole cream.  Advised to apply the cream followed by the powder. Follow-up with primary care provider as needed. We will also start on clindamycin for suspected possible cellulitis. She does have allergy to cephalosporins. Report to ER with new or severe symptoms. She denies questions. PATIENT REFERRED TO:  Ben Kyle, [address] [place] [address]  [phone]      If symptoms worsen    Ben Kyle MD  [facility]  [phone]        DISCHARGE MEDICATIONS:  Discharge Medication List as of 1/11/2023 11:56 AM        START taking these medications    Details   nystatin (MYCOSTATIN) POWD powder Apply 1 each topically 2 times daily for 21 days, Disp-1 each, R-0Normal      ketoconazole (NIZORAL) 2 % cream Apply topically 2 times daily for 21 days Apply topically daily. , Topical, 2 TIMES DAILY Starting Wed 1/11/2023, Until Wed 2/1/2023, For 21 days, Disp-30 g, R-1, Normal      clindamycin (CLEOCIN) 300 MG capsule Take 1 capsule by mouth 3 times daily for 5 days, Disp-15 capsule, R-0Normal           Discharge Medication List as of 1/11/2023 11:56 AM          ANA Raines - ANA Scott CNP  01/11/23 7966

## 2023-03-13 ENCOUNTER — HOSPITAL ENCOUNTER (EMERGENCY)
Age: 73
Discharge: HOME | End: 2023-03-13
Payer: COMMERCIAL

## 2023-03-13 ENCOUNTER — APPOINTMENT (OUTPATIENT)
Dept: CT IMAGING | Age: 73
End: 2023-03-13
Payer: COMMERCIAL

## 2023-03-13 VITALS
BODY MASS INDEX: 35.44 KG/M2 | SYSTOLIC BLOOD PRESSURE: 145 MMHG | RESPIRATION RATE: 16 BRPM | DIASTOLIC BLOOD PRESSURE: 78 MMHG | HEIGHT: 63 IN | HEART RATE: 54 BPM | WEIGHT: 200 LBS | OXYGEN SATURATION: 97 % | TEMPERATURE: 98.1 F

## 2023-03-13 DIAGNOSIS — N30.01 ACUTE CYSTITIS WITH HEMATURIA: Primary | ICD-10-CM

## 2023-03-13 LAB
ANION GAP SERPL CALC-SCNC: 15 MEQ/L (ref 8–16)
BACTERIA URNS QL MICRO: ABNORMAL /HPF
BASOPHILS ABSOLUTE: 0 THOU/MM3 (ref 0–0.1)
BASOPHILS NFR BLD AUTO: 0.3 %
BILIRUB UR QL STRIP.AUTO: NEGATIVE
BUN SERPL-MCNC: 11 MG/DL (ref 7–22)
CALCIUM SERPL-MCNC: 9.1 MG/DL (ref 8.5–10.5)
CASTS #/AREA URNS LPF: ABNORMAL /LPF
CASTS 2: ABNORMAL /LPF
CHARACTER UR: ABNORMAL
CHLORIDE SERPL-SCNC: 107 MEQ/L (ref 98–111)
CO2 SERPL-SCNC: 24 MEQ/L (ref 23–33)
COLOR: YELLOW
CREAT SERPL-MCNC: 0.6 MG/DL (ref 0.4–1.2)
CRYSTALS URNS MICRO: ABNORMAL
DEPRECATED RDW RBC AUTO: 46.9 FL (ref 35–45)
EOSINOPHIL NFR BLD AUTO: 0.7 %
EOSINOPHILS ABSOLUTE: 0.1 THOU/MM3 (ref 0–0.4)
EPITHELIAL CELLS, UA: ABNORMAL /HPF
ERYTHROCYTE [DISTWIDTH] IN BLOOD BY AUTOMATED COUNT: 14 % (ref 11.5–14.5)
GFR SERPL CREATININE-BSD FRML MDRD: > 60 ML/MIN/1.73M2
GLUCOSE SERPL-MCNC: 170 MG/DL (ref 70–108)
GLUCOSE UR QL STRIP.AUTO: NEGATIVE MG/DL
HCT VFR BLD AUTO: 41.4 % (ref 37–47)
HGB BLD-MCNC: 12.6 GM/DL (ref 12–16)
HGB UR QL STRIP.AUTO: ABNORMAL
IMM GRANULOCYTES # BLD AUTO: 0.06 THOU/MM3 (ref 0–0.07)
IMM GRANULOCYTES NFR BLD AUTO: 0.7 %
KETONES UR QL STRIP.AUTO: NEGATIVE
LYMPHOCYTES ABSOLUTE: 1.4 THOU/MM3 (ref 1–4.8)
LYMPHOCYTES NFR BLD AUTO: 16 %
MCH RBC QN AUTO: 27.8 PG (ref 26–33)
MCHC RBC AUTO-ENTMCNC: 30.4 GM/DL (ref 32.2–35.5)
MCV RBC AUTO: 91.4 FL (ref 81–99)
MISCELLANEOUS 2: ABNORMAL
MONOCYTES ABSOLUTE: 0.5 THOU/MM3 (ref 0.4–1.3)
MONOCYTES NFR BLD AUTO: 6.1 %
NEUTROPHILS NFR BLD AUTO: 76.2 %
NITRITE UR QL STRIP: NEGATIVE
NRBC BLD AUTO-RTO: 0 /100 WBC
OSMOLALITY SERPL CALC.SUM OF ELEC: 293.9 MOSMOL/KG (ref 275–300)
PH UR STRIP.AUTO: 5.5 [PH] (ref 5–9)
PLATELET # BLD AUTO: 196 THOU/MM3 (ref 130–400)
PMV BLD AUTO: 10.1 FL (ref 9.4–12.4)
POTASSIUM SERPL-SCNC: 4.5 MEQ/L (ref 3.5–5.2)
PROT UR STRIP.AUTO-MCNC: 30 MG/DL
RBC # BLD AUTO: 4.53 MILL/MM3 (ref 4.2–5.4)
RBC URINE: ABNORMAL /HPF
RENAL EPI CELLS #/AREA URNS HPF: ABNORMAL /[HPF]
SEGMENTED NEUTROPHILS ABSOLUTE COUNT: 6.7 THOU/MM3 (ref 1.8–7.7)
SODIUM SERPL-SCNC: 146 MEQ/L (ref 135–145)
SP GR UR REFRACT.AUTO: 1.01 (ref 1–1.03)
UROBILINOGEN, URINE: 0.2 EU/DL (ref 0–1)
WBC # BLD AUTO: 8.8 THOU/MM3 (ref 4.8–10.8)
WBC #/AREA URNS HPF: > 200 /HPF
WBC #/AREA URNS HPF: ABNORMAL /[HPF]
YEAST LIKE FUNGI URNS QL MICRO: ABNORMAL

## 2023-03-13 PROCEDURE — 87086 URINE CULTURE/COLONY COUNT: CPT

## 2023-03-13 PROCEDURE — 80048 BASIC METABOLIC PNL TOTAL CA: CPT

## 2023-03-13 PROCEDURE — 36415 COLL VENOUS BLD VENIPUNCTURE: CPT

## 2023-03-13 PROCEDURE — 81001 URINALYSIS AUTO W/SCOPE: CPT

## 2023-03-13 PROCEDURE — 74176 CT ABD & PELVIS W/O CONTRAST: CPT

## 2023-03-13 PROCEDURE — 99284 EMERGENCY DEPT VISIT MOD MDM: CPT

## 2023-03-13 PROCEDURE — 85025 COMPLETE CBC W/AUTO DIFF WBC: CPT

## 2023-03-13 RX ORDER — FUROSEMIDE 20 MG/1
20 TABLET ORAL DAILY
COMMUNITY

## 2023-03-13 RX ORDER — POTASSIUM CHLORIDE 1.5 G/1.77G
10 POWDER, FOR SOLUTION ORAL 2 TIMES DAILY
COMMUNITY

## 2023-03-13 RX ORDER — SULFAMETHOXAZOLE AND TRIMETHOPRIM 800; 160 MG/1; MG/1
1 TABLET ORAL 2 TIMES DAILY
Qty: 20 TABLET | Refills: 0 | Status: SHIPPED | OUTPATIENT
Start: 2023-03-13 | End: 2023-03-23

## 2023-03-13 NOTE — ED NOTES
Patient up to bathroom in hallway. Gait steady and even. Patient back to room. Lying semi fowlers on left side. Call light in reach. Will continue to monitor.      Diandra Ortiz RN  03/13/23 2727

## 2023-03-13 NOTE — ED NOTES
Straight cath completed at this time. Urine sample obtained. Patient repositioned in bed by self. Call light in reach. Will continue to monitor.  at bedside.     Fabienne Nunez RN  03/13/23 2598

## 2023-03-13 NOTE — ED TRIAGE NOTES
Patient to ED c/c vaginal bleeding. Patient states she had the urgency to use the restroom and pee. States only a little pee came out but when she looked into the toilet, it was full of bright red blood that she believes came from the vagina. Patient has had hysterectomy. Patient has not seen a gynecologist, but sees her PCP regularly.

## 2023-03-15 ENCOUNTER — HOSPITAL ENCOUNTER (OUTPATIENT)
Dept: WOMENS IMAGING | Age: 73
Discharge: HOME | End: 2023-03-15
Payer: COMMERCIAL

## 2023-03-15 DIAGNOSIS — Z12.31 VISIT FOR SCREENING MAMMOGRAM: ICD-10-CM

## 2023-03-15 LAB — BACTERIA UR CULT: NORMAL

## 2023-03-15 PROCEDURE — 77067 SCR MAMMO BI INCL CAD: CPT

## 2023-03-15 NOTE — ED PROVIDER NOTES
[address] \[facility]\"" [address] [address] EMERGENCY DEPT      EMERGENCY MEDICINE     Pt Name: Jennifer Castillo  MRN: [id number]  [place] 1950  Date of evaluation: 3/13/2023  Provider: ANA Hodges CNP    CHIEF COMPLAINT       Chief Complaint   Patient presents with    Vaginal Bleeding     HISTORY OF PRESENT ILLNESS   Jennifer Castillo is a pleasant 74 y.o. female who presents to the emergency department from home with c/o hematuria. The patient states she went to the bathroom and could not void. She felt like she had to strain a lot to go and when she finally did go, it was bloody. Has happened a couple more times since. No fever. Some lower abdominal discomfort. Is on effient      History is obtained from:  patient  PASTMEDICAL HISTORY     Past Medical History:   Diagnosis Date    Arthritis     Asthma     seasonal    Depression     Fibromyalgia     GERD (gastroesophageal reflux disease)     Hx of blood clots 2003    DVT after arthroscopy right knee    Hyperlipidemia     Hypertension     Hypothyroidism     Restless legs syndrome     Type II or unspecified type diabetes mellitus without mention of complication, not stated as uncontrolled     Unspecified sleep apnea     no CPAP       Patient Active Problem List   Diagnosis Code    Depression F32. A    Type II or unspecified type diabetes mellitus without mention of complication, not stated as uncontrolled E11.9    GERD (gastroesophageal reflux disease) K21.9    Hypertension I10    Asthma J45.909    Hyperlipidemia E78.5    Personal history of DVT (deep vein thrombosis) Z86.718    Fibromyalgia M79.7    Obesity (BMI 30-39. 9) E66.9    Radial scar of breast N64.89    Obstructive sleep apnea of adult G47.33     SURGICAL HISTORY       Past Surgical History:   Procedure Laterality Date    BREAST LUMPECTOMY Right 10/17/2013    radial scar removal    CARDIAC CATHETERIZATION  2006??    results ok    COLONOSCOPY      last one 2014??    COLONOSCOPY  08/11/2017    Dr Sky Slight  feb. 2016    left cataract surgery    HYSTERECTOMY ([address])  [address] Right 2013    total knee replacement    KNEE ARTHROSCOPY Right 2003    VT BX OF BREAST,VACUUM ASST,IMAGE GUIDE Right 09/26/2013    Radial scar    SINUS SURGERY  1994    SKIN BIOPSY      TONSILLECTOMY  1955    UPPER GASTROINTESTINAL ENDOSCOPY  07/25/2017    Dr Maksim Funes  2003    casandra filter placed       CURRENT MEDICATIONS       Discharge Medication List as of 3/13/2023  2:11 PM        CONTINUE these medications which have NOT CHANGED    Details   furosemide (LASIX) 20 MG tablet Take 20 mg by mouth dailyHistorical Med      potassium chloride (KLOR-CON) 20 MEQ packet Take 10 mEq by mouth 2 times dailyHistorical Med      UNABLE TO FIND Unkers ointmentHistorical Med      prasugrel (EFFIENT) 5 MG TABS TAKE 1 TABLET BY MOUTH EVERY DAYHistorical Med      losartan (COZAAR) 50 MG tablet TAKE 1 TABLET BY MOUTH EVERY DAYHistorical Med      pantoprazole (PROTONIX) 40 MG tablet Take 1 tablet by mouth every morning (before breakfast), Disp-90 tablet, R-2Normal      ondansetron (ZOFRAN ODT) 4 MG disintegrating tablet Take 1 tablet by mouth every 8 hours as needed for Nausea or Vomiting, Disp-12 tablet, R-0Normal      acetaminophen (TYLENOL) 325 MG tablet Take 650 mg by mouth every 6 hours as needed for PainHistorical Med      Multiple Vitamins-Minerals (EYE VITAMINS PO) Take by mouth dailyHistorical Med      MULTIPLE VITAMIN PO Take by mouth dailyHistorical Med      Lactobacillus (ACIDOPHILUS PO) Take by mouth daily Indications: 1Billion 3 dailyHistorical Med      diphenoxylate-atropine (LOMOTIL) 2.5-0.025 MG per tablet Take 1 tablet by mouth 4 times daily as needed for Diarrhea. Historical Med      atorvastatin (LIPITOR) 10 MG tablet Take 10 mg by mouth dailyHistorical Med      venlafaxine (EFFEXOR XR) 150 MG extended release capsule Take 150 mg by mouth dailyHistorical Med      levothyroxine (SYNTHROID) 25 MCG tablet Take 25 mcg by mouth DailyHistorical Med      sitaGLIPtan (JANUVIA) 100 MG tablet Take 100 mg by mouth daily. atenolol (TENORMIN) 25 MG tablet Take 25 mg by mouth daily. glimepiride (AMARYL) 1 MG tablet Take 1 mg by mouth every morning (before breakfast). ALLERGIES     is allergic to cephalosporins and morphine. FAMILY HISTORY     She indicated that her mother is . She indicated that her father is . She indicated that both of her sisters are alive. She indicated that her brother is alive. She indicated that the status of her paternal aunt is unknown. SOCIAL HISTORY       Social History     Tobacco Use    Smoking status: Never    Smokeless tobacco: Never   Vaping Use    Vaping Use: Never used   Substance Use Topics    Alcohol use: Never     Alcohol/week: 0.0 standard drinks    Drug use: No       PHYSICAL EXAM       ED Triage Vitals [23 1110]   BP Temp Temp Source Heart Rate Resp SpO2 Height Weight   (!) 162/96 98.1 °F (36.7 °C) Oral 85 20 97 % 5' 3\" (1.6 m) 200 lb (90.7 kg)       Physical Exam  Vitals and nursing note reviewed. Constitutional:       General: She is not in acute distress. Appearance: She is well-developed. She is not diaphoretic. HENT:      Head: Normocephalic and atraumatic. Nose: Nose normal.      Mouth/Throat:      Mouth: Mucous membranes are moist.      Pharynx: Oropharynx is clear. Eyes:      General:         Right eye: No discharge. Left eye: No discharge. Conjunctiva/sclera: Conjunctivae normal.   Neck:      Trachea: No tracheal deviation. Cardiovascular:      Rate and Rhythm: Normal rate and regular rhythm. Pulses: Normal pulses. Heart sounds: Normal heart sounds. No murmur heard. No gallop. Comments: Normal capillary refill  Pulmonary:      Effort: Pulmonary effort is normal. No respiratory distress. Breath sounds: Normal breath sounds. No stridor.    Abdominal: General: Bowel sounds are normal.      Palpations: Abdomen is soft. Musculoskeletal:         General: No tenderness or deformity. Normal range of motion. Cervical back: Normal range of motion. Skin:     General: Skin is warm and dry. Capillary Refill: Capillary refill takes less than 2 seconds. Coloration: Skin is not pale. Findings: No erythema or rash. Neurological:      General: No focal deficit present. Mental Status: She is alert and oriented to person, place, and time. Cranial Nerves: No cranial nerve deficit. Psychiatric:         Mood and Affect: Mood normal.         Behavior: Behavior normal.       FORMAL DIAGNOSTIC RESULTS     RADIOLOGY: Interpretation per the Radiologist below, if available at the time of this note (none if blank):    CT ABDOMEN PELVIS WO CONTRAST Additional Contrast? None   Final Result       1. 3.6 mm stone in the upper pole of the left kidney. No associated hydronephrosis. 2. 2.7 x 2.3 cm cyst in the left lobe of liver, unchanged. 3. Small hiatal hernia. Small periumbilical hernia containing fat. 4. Mild fullness left adrenal gland, unchanged. 5. Atherosclerotic calcification in the abdominal aorta and iliac arteries. IVC filter in place. 6. Status post hysterectomy. 7. Diverticulosis in the colon with no evidence for acute diverticulitis. 8. Lumbar spondylosis. Matt Robledo **This report has been created using voice recognition software. It may contain minor errors which are inherent in voice recognition technology. **      Final report electronically signed by DR Yuliet Carty on 3/13/2023 12:42 PM          LABS: (none if blank)  Labs Reviewed   CBC WITH AUTO DIFFERENTIAL - Abnormal; Notable for the following components:       Result Value    MCHC 30.4 (*)     RDW-SD 46.9 (*)     All other components within normal limits   BASIC METABOLIC PANEL - Abnormal; Notable for the following components:    Sodium 146 (*)     Glucose 170 (*)     All other components within normal limits   URINE WITH REFLEXED MICRO - Abnormal; Notable for the following components:    Blood, Urine LARGE (*)     Protein, UA 30 (*)     Leukocyte Esterase, Urine LARGE (*)     Character, Urine CLOUDY (*)     All other components within normal limits   CULTURE, REFLEXED, URINE    Narrative:     Source: urine, clean catch       Site: clean void          Current Antibiotics: not stated   ANION GAP   OSMOLALITY   GLOMERULAR FILTRATION RATE, ESTIMATED       (Any cultures that may have been sent were not resulted at the time of this patient visit)    [facility] / ED COURSE:     Summary of Patient Presentation:      1) Number and Complexity of Problems            Problem List This Visit:         Chief Complaint   Patient presents with    Vaginal Bleeding             Differential Diagnosis includes (but not limited to):  Cystitis, kidney stone, hematuria, pyelonephritis        Diagnoses Considered but I have low suspicion of:                 Pertinent Comorbid Conditions:         2)  Data Reviewed (none if left blank, additional information can be found in the ED course)          My Independent interpretations:     EKG:           Imaging: stone within the kidney, other chronic findings    Labs:       UTI--other labs reassuring, no signs of anemia or sepsis                 Decision Rules/Clinical Scores utilized:                            External Documentation Reviewed:         Previous patient encounter documents & history available on EMR was reviewed              See Formal Diagnostic Results above for the lab and radiology tests and orders.          3)  Treatment and Disposition         ED Reassessment:   Stable, improved         Case discussed with consulting clinician/attending physician:           Shared Decision-Making was performed and disposition discussed with the       Patient/Family and questions answered          Social determinants of health impacting treatment or disposition:           Code Status:  Full        MDM    Vitals Reviewed:    Vitals:    03/13/23 1110 03/13/23 1306   BP: (!) 162/96 (!) 145/78   Pulse: 85 54   Resp: 20 16   Temp: 98.1 °F (36.7 °C)    TempSrc: Oral    SpO2: 97% 97%   Weight: 200 lb (90.7 kg)    Height: 5' 3\" (1.6 m)        The patient was seen and examined. Appropriate diagnostic testing was performed and results reviewed with the patient. The patient is found to have a UTI with hematuria. CT scan is unremarkable to significant acute pathology--multiple chronic findings. I reviewed findings with the patient. She is to increase fluids. Follow up with pcp. Finish all abx. If hematuria does not resolve with treatment, should see urology for cystoscopy. Patient verbalized understanding. DC home      The results of pertinent diagnostic studies and exam findings were discussed. The patients provisional diagnosis and plan of care were discussed with the patient and present family who expressed understanding and agreement with the POC. Any medications were reviewed and indications and risks of medications were discussed with the patient /family present. Strict verbal and written return precautions, instructions and appropriate follow-up provided to  the patient. Patient was DISCHARGED from the hospital. Based on the reassuring ED workup and patient's stable vital signs, I feel the patient may be safely discharged home. At this point in time, I believe the patient has the mental capacity to make medical decisions. No notes of EC Admission Criteria type on file. Please note that the patient was evaluated during a pandemic. All efforts were made for HIPPA compliance as well as provision of appropriate care. Patient was seen independently by myself. The patient's final impression and disposition and plan was determined by myself.      Strict return precautions and follow up instructions were discussed with the patient prior to discharge, with which the patient agrees. Physical assessment findings, diagnostic testing(s) if applicable, and vital signs reviewed with patient/patient representative. Questions answered. Medications asdirected, including OTC medications for supportive care. Education provided on medications. Differential diagnosis(s) discussed with patient/patient representative. Home care/self care instructions reviewed withpatient/patient representative. Patient is to follow-up with family care provider in 2-3 days if no improvement. Patient is to go to the emergency department if symptoms worsen. Patient/patient representative isaware of care plan, questions answered, verbalizes understanding and is in agreement. ED Medications administered this visit:  (None if blank)  Medications - No data to display      CONSULTS:  None    PROCEDURES: (None if blank)  Procedures:     CRITICAL CARE: (None if blank)      DISCHARGE PRESCRIPTIONS: (None if blank)  Discharge Medication List as of 3/13/2023  2:11 PM        START taking these medications    Details   sulfamethoxazole-trimethoprim (BACTRIM DS) 800-160 MG per tablet Take 1 tablet by mouth 2 times daily for 10 days, Disp-20 tablet, R-0Normal             FINAL IMPRESSION      1.  Acute cystitis with hematuria          DISPOSITION/PLAN   DISPOSITION Decision To Discharge 03/13/2023 02:09:08 PM      OUTPATIENT FOLLOW UP THE PATIENT:  Rosangela West MD  [address] [facility] [address] .[place] [id number]    Schedule an appointment as soon as possible for a visit in 2 days  For follow up    [address] [place] [address] [id number] [phone]  Schedule an appointment as soon as possible for a visit in 1 week  If symptoms worsen, For follow up      ANA Nguyen CNP, APRN - CNP  03/15/23 7086

## 2023-06-24 ENCOUNTER — HOSPITAL ENCOUNTER (EMERGENCY)
Age: 73
Discharge: HOME | End: 2023-06-24
Payer: COMMERCIAL

## 2023-06-24 VITALS
HEART RATE: 67 BPM | OXYGEN SATURATION: 97 % | TEMPERATURE: 97.5 F | BODY MASS INDEX: 35.43 KG/M2 | SYSTOLIC BLOOD PRESSURE: 171 MMHG | RESPIRATION RATE: 16 BRPM | DIASTOLIC BLOOD PRESSURE: 75 MMHG | WEIGHT: 200 LBS

## 2023-06-24 DIAGNOSIS — L23.7 POISON IVY DERMATITIS: Primary | ICD-10-CM

## 2023-06-24 PROCEDURE — 99213 OFFICE O/P EST LOW 20 MIN: CPT | Performed by: NURSE PRACTITIONER

## 2023-06-24 PROCEDURE — 99213 OFFICE O/P EST LOW 20 MIN: CPT

## 2023-06-24 RX ORDER — PREDNISONE 20 MG/1
TABLET ORAL
Qty: 10 TABLET | Refills: 0 | Status: SHIPPED | OUTPATIENT
Start: 2023-06-24 | End: 2023-07-01

## 2023-06-24 ASSESSMENT — PAIN - FUNCTIONAL ASSESSMENT: PAIN_FUNCTIONAL_ASSESSMENT: NONE - DENIES PAIN

## 2023-06-24 ASSESSMENT — ENCOUNTER SYMPTOMS
CONSTIPATION: 0
BLOOD IN STOOL: 0
DIARRHEA: 0
RHINORRHEA: 0
EYE PAIN: 0
NAUSEA: 0
ABDOMINAL PAIN: 0
COUGH: 0
SHORTNESS OF BREATH: 0
WHEEZING: 0
VOMITING: 0

## 2023-06-24 NOTE — DISCHARGE INSTRUCTIONS
Go to ER for worsening symptoms, signs of infection including puslike drainage, worsening redness, fever, difficulty breathing or chest pain. Follow-up with your primary care provider. Use Zanfel soap to cleanse body. This can be purchased over the counter.

## 2023-06-24 NOTE — ED PROVIDER NOTES
spot on buttocks that started 2 days ago. On examination, patient has a vesicular type rash on her left forearm that she states has also spread to her buttocks. This does appear to be a poison ivy dermatitis. This will be treated with oral prednisone. Patient is to continue the hydrocortisone cream that she has been applying at home. Patient instructed to try to avoid itching and wash her hands frequently. To follow-up with primary care. Patient instructed to go to ER for worsening symptoms, signs of infection including puslike drainage, worsening redness, fever, difficulty breathing or chest pain. Follow-up with your primary care provider. Use Zanfel soap to cleanse body. This can be purchased over the counter. Medications - No data to display  PROCEDURES:    Procedures    FINALIMPRESSION      1. Poison ivy dermatitis        DISPOSITION/PLAN   DISPOSITION Decision To Discharge 06/24/2023 10:17:54 AM    PATIENT [facility]:  [facility] [address] [place] [address] [address] [place], [place] [phone]    In 2 days      DISCHARGE MEDICATIONS:  New Prescriptions    PREDNISONE (DELTASONE) 20 MG TABLET    Take 3 tablets by mouth daily for 1 day, THEN 2 tablets daily for 2 days, THEN 1 tablet daily for 2 days, THEN 0.5 tablets daily for 2 days.      Current Discharge Medication List          ANA Wooten CNP, APRN - CNP  06/24/23 1019

## 2023-11-02 ENCOUNTER — APPOINTMENT (OUTPATIENT)
Dept: CT IMAGING | Age: 73
End: 2023-11-02
Payer: COMMERCIAL

## 2023-11-02 ENCOUNTER — HOSPITAL ENCOUNTER (EMERGENCY)
Age: 73
Discharge: HOME | End: 2023-11-02
Attending: EMERGENCY MEDICINE
Payer: COMMERCIAL

## 2023-11-02 VITALS
WEIGHT: 200 LBS | HEART RATE: 57 BPM | OXYGEN SATURATION: 98 % | DIASTOLIC BLOOD PRESSURE: 71 MMHG | BODY MASS INDEX: 35.44 KG/M2 | HEIGHT: 63 IN | SYSTOLIC BLOOD PRESSURE: 151 MMHG | TEMPERATURE: 97.6 F | RESPIRATION RATE: 16 BRPM

## 2023-11-02 DIAGNOSIS — R51.9 NONINTRACTABLE HEADACHE, UNSPECIFIED CHRONICITY PATTERN, UNSPECIFIED HEADACHE TYPE: ICD-10-CM

## 2023-11-02 DIAGNOSIS — J01.90 ACUTE SINUSITIS, RECURRENCE NOT SPECIFIED, UNSPECIFIED LOCATION: Primary | ICD-10-CM

## 2023-11-02 LAB
ANION GAP SERPL CALC-SCNC: 13 MEQ/L (ref 8–16)
BACTERIA URNS QL MICRO: ABNORMAL /HPF
BASOPHILS ABSOLUTE: 0 THOU/MM3 (ref 0–0.1)
BASOPHILS NFR BLD AUTO: 0.3 %
BILIRUB UR QL STRIP.AUTO: NEGATIVE
BUN SERPL-MCNC: 24 MG/DL (ref 7–22)
CALCIUM SERPL-MCNC: 9.5 MG/DL (ref 8.5–10.5)
CASTS #/AREA URNS LPF: ABNORMAL /LPF
CASTS 2: ABNORMAL /LPF
CHARACTER UR: ABNORMAL
CHLORIDE SERPL-SCNC: 100 MEQ/L (ref 98–111)
CO2 SERPL-SCNC: 25 MEQ/L (ref 23–33)
COLOR: YELLOW
CREAT SERPL-MCNC: 0.7 MG/DL (ref 0.4–1.2)
CRYSTALS URNS MICRO: ABNORMAL
DEPRECATED RDW RBC AUTO: 46.5 FL (ref 35–45)
EOSINOPHIL NFR BLD AUTO: 0.4 %
EOSINOPHILS ABSOLUTE: 0 THOU/MM3 (ref 0–0.4)
EPITHELIAL CELLS, UA: ABNORMAL /HPF
ERYTHROCYTE [DISTWIDTH] IN BLOOD BY AUTOMATED COUNT: 14 % (ref 11.5–14.5)
FLUAV RNA RESP QL NAA+PROBE: NOT DETECTED
FLUBV RNA RESP QL NAA+PROBE: NOT DETECTED
GFR SERPL CREATININE-BSD FRML MDRD: > 60 ML/MIN/1.73M2
GLUCOSE SERPL-MCNC: 134 MG/DL (ref 70–108)
GLUCOSE UR QL STRIP.AUTO: NEGATIVE MG/DL
HCT VFR BLD AUTO: 43.3 % (ref 37–47)
HGB BLD-MCNC: 13.6 GM/DL (ref 12–16)
HGB UR QL STRIP.AUTO: ABNORMAL
IMM GRANULOCYTES # BLD AUTO: 0.04 THOU/MM3 (ref 0–0.07)
IMM GRANULOCYTES NFR BLD AUTO: 0.4 %
KETONES UR QL STRIP.AUTO: 15
LYMPHOCYTES ABSOLUTE: 1.9 THOU/MM3 (ref 1–4.8)
LYMPHOCYTES NFR BLD AUTO: 20.9 %
MCH RBC QN AUTO: 28.3 PG (ref 26–33)
MCHC RBC AUTO-ENTMCNC: 31.4 GM/DL (ref 32.2–35.5)
MCV RBC AUTO: 90.2 FL (ref 81–99)
MISCELLANEOUS 2: ABNORMAL
MONOCYTES ABSOLUTE: 0.8 THOU/MM3 (ref 0.4–1.3)
MONOCYTES NFR BLD AUTO: 8.2 %
NEUTROPHILS NFR BLD AUTO: 69.8 %
NITRITE UR QL STRIP: NEGATIVE
NRBC BLD AUTO-RTO: 0 /100 WBC
NT-PROBNP SERPL IA-MCNC: 97.9 PG/ML (ref 0–449)
OSMOLALITY SERPL CALC.SUM OF ELEC: 281.7 MOSMOL/KG (ref 275–300)
PH UR STRIP.AUTO: 6.5 [PH] (ref 5–9)
PLATELET # BLD AUTO: 210 THOU/MM3 (ref 130–400)
PMV BLD AUTO: 10.7 FL (ref 9.4–12.4)
POTASSIUM SERPL-SCNC: 4.5 MEQ/L (ref 3.5–5.2)
PROT UR STRIP.AUTO-MCNC: NEGATIVE MG/DL
RBC # BLD AUTO: 4.8 MILL/MM3 (ref 4.2–5.4)
RBC URINE: ABNORMAL /HPF
RENAL EPI CELLS #/AREA URNS HPF: ABNORMAL /[HPF]
SARS-COV-2 RNA RESP QL NAA+PROBE: NOT DETECTED
SEGMENTED NEUTROPHILS ABSOLUTE COUNT: 6.4 THOU/MM3 (ref 1.8–7.7)
SODIUM SERPL-SCNC: 138 MEQ/L (ref 135–145)
SP GR UR REFRACT.AUTO: > 1.03 (ref 1–1.03)
T4 FREE SERPL-MCNC: 1.22 NG/DL (ref 0.93–1.76)
TROPONIN, HIGH SENSITIVITY: 8 NG/L (ref 0–12)
TSH SERPL DL<=0.005 MIU/L-ACNC: 3.29 UIU/ML (ref 0.4–4.2)
UROBILINOGEN, URINE: 0.2 EU/DL (ref 0–1)
WBC # BLD AUTO: 9.2 THOU/MM3 (ref 4.8–10.8)
WBC #/AREA URNS HPF: ABNORMAL /HPF
WBC #/AREA URNS HPF: NEGATIVE /[HPF]
YEAST LIKE FUNGI URNS QL MICRO: ABNORMAL

## 2023-11-02 PROCEDURE — 99284 EMERGENCY DEPT VISIT MOD MDM: CPT

## 2023-11-02 PROCEDURE — 84484 ASSAY OF TROPONIN QUANT: CPT

## 2023-11-02 PROCEDURE — 81001 URINALYSIS AUTO W/SCOPE: CPT

## 2023-11-02 PROCEDURE — 83880 ASSAY OF NATRIURETIC PEPTIDE: CPT

## 2023-11-02 PROCEDURE — 36415 COLL VENOUS BLD VENIPUNCTURE: CPT

## 2023-11-02 PROCEDURE — 70450 CT HEAD/BRAIN W/O DYE: CPT

## 2023-11-02 PROCEDURE — 84439 ASSAY OF FREE THYROXINE: CPT

## 2023-11-02 PROCEDURE — 80048 BASIC METABOLIC PNL TOTAL CA: CPT

## 2023-11-02 PROCEDURE — 85025 COMPLETE CBC W/AUTO DIFF WBC: CPT

## 2023-11-02 PROCEDURE — 93005 ELECTROCARDIOGRAM TRACING: CPT | Performed by: EMERGENCY MEDICINE

## 2023-11-02 PROCEDURE — 83036 HEMOGLOBIN GLYCOSYLATED A1C: CPT

## 2023-11-02 PROCEDURE — 93010 ELECTROCARDIOGRAM REPORT: CPT | Performed by: NUCLEAR MEDICINE

## 2023-11-02 PROCEDURE — 87636 SARSCOV2 & INF A&B AMP PRB: CPT

## 2023-11-02 PROCEDURE — 84443 ASSAY THYROID STIM HORMONE: CPT

## 2023-11-02 RX ORDER — CETIRIZINE HYDROCHLORIDE 10 MG/1
10 TABLET ORAL DAILY
Qty: 30 TABLET | Refills: 0 | Status: SHIPPED | OUTPATIENT
Start: 2023-11-02 | End: 2023-12-02

## 2023-11-02 RX ORDER — ONDANSETRON 4 MG/1
4 TABLET, ORALLY DISINTEGRATING ORAL 3 TIMES DAILY PRN
Qty: 21 TABLET | Refills: 0 | Status: SHIPPED | OUTPATIENT
Start: 2023-11-02

## 2023-11-02 RX ORDER — FLUTICASONE PROPIONATE 50 MCG
2 SPRAY, SUSPENSION (ML) NASAL DAILY
Qty: 16 G | Refills: 0 | Status: SHIPPED | OUTPATIENT
Start: 2023-11-02

## 2023-11-02 ASSESSMENT — PAIN - FUNCTIONAL ASSESSMENT
PAIN_FUNCTIONAL_ASSESSMENT: 0-10

## 2023-11-02 ASSESSMENT — VISUAL ACUITY
OD: 20/40
OS: 20/40
OU: 20/30

## 2023-11-02 ASSESSMENT — PAIN SCALES - GENERAL
PAINLEVEL_OUTOF10: 2

## 2023-11-02 ASSESSMENT — PAIN DESCRIPTION - LOCATION: LOCATION: HEAD

## 2023-11-02 NOTE — DISCHARGE INSTRUCTIONS
You are seen today in the emergency department for headache, feeling unwell. Your lab work was reassuring. You tested negative for COVID, flu, no UTI. We believe that your symptoms may be due to your recent medication change. Please follow-up with your family doctor regarding symptoms. You are also seen to have sinusitis. Which is an infection of your sinuses. We are sending you home with Flonase, Zyrtec to help with the symptoms. We are also sending home on Zofran which will help with your nausea.

## 2023-11-02 NOTE — ED NOTES
This SN in to round on pt and perform visual acuity assessment. Pt ambulated in hallway and returned to ED cot. VSS. Call light within reach. Visitor at bedside.      Augusto Grande  11/02/23 2740

## 2023-11-02 NOTE — ED NOTES
Pt ambulated to bathroom without assistance and returned to ED cot. VSS. RR regular and unlabored. Pt to CT at this time.      Valentin Underwood  11/02/23 8710

## 2023-11-02 NOTE — ED PROVIDER NOTES
[facility] EMERGENCY DEPT    Pt Name: Carri Patel  MRN: [id number]  [address] [facility] [place] 1950  Date of evaluation: 11/2/2023  Resident Physician: Vannessa Syed MD  Supervising Physician: Dr. Aysha Scott DO    CHIEF COMPLAINT       Chief Complaint   Patient presents with    Headache     HISTORY OF PRESENT ILLNESS   Carri Patel is a 74 y.o. female with PMHx of hypertension, diabetes, hyperlipidemia, obesity, depression, fibromyalgia  who presents to the emergency department for evaluation of headache. Patient states that she has been having a headache since last Wednesday. Also been complaining of dizziness improves when she lies down. Patient states that the headache is a roaring sound in her head. But denies any tinnitus. Patient also states that she has been having some blurry vision starting since last week as well. States that she has noticed this while she was driving. Symptoms improved while she lies down after getting home from driving. Patient also complaining of getting up 5-7 times at night to pee only very minimally. Patient states that the only new medication that she has been on is Rybelsus. She was placed on this approximately 1 month ago for glucose control. She is currently on 3 different medications to control her diabetes. Patient is on Effient. The patient has no other acute complaints at this time. Review of Systems    Negative Except as Documented Above.     PASTMEDICAL HISTORY     Past Medical History:   Diagnosis Date    Arthritis     Asthma     seasonal    Depression     Fibromyalgia     GERD (gastroesophageal reflux disease)     Hx of blood clots 2003    DVT after arthroscopy right knee    Hyperlipidemia     Hypertension     Hypothyroidism     Restless legs syndrome     Type II or unspecified type diabetes mellitus without mention of complication, not stated as uncontrolled     Unspecified sleep apnea     no CPAP       Patient Active Problem List   Diagnosis Code

## 2023-11-02 NOTE — ED TRIAGE NOTES
Pt presents from home with a chief complaint of a headache since last Wednesday and dizzyness that is alleviated by laying down. Pt reports that appetite suppression due to nausea that arises when eating. VSS upon arrival. EKG performed. PIV established. Labs collected. Call light in reach.

## 2023-11-03 LAB
DEPRECATED MEAN GLUCOSE BLD GHB EST-ACNC: 138 MG/DL (ref 70–126)
EKG ATRIAL RATE: 63 BPM
EKG P AXIS: 63 DEGREES
EKG P-R INTERVAL: 140 MS
EKG Q-T INTERVAL: 432 MS
EKG QRS DURATION: 94 MS
EKG QTC CALCULATION (BAZETT): 442 MS
EKG R AXIS: -8 DEGREES
EKG T AXIS: 56 DEGREES
EKG VENTRICULAR RATE: 63 BPM
HBA1C MFR BLD HPLC: 6.6 % (ref 4.4–6.4)

## 2024-01-15 ENCOUNTER — HOSPITAL ENCOUNTER (EMERGENCY)
Age: 74
Discharge: HOME | End: 2024-01-15
Payer: MEDICARE

## 2024-01-15 VITALS
WEIGHT: 197 LBS | BODY MASS INDEX: 34.9 KG/M2 | RESPIRATION RATE: 16 BRPM | OXYGEN SATURATION: 96 % | SYSTOLIC BLOOD PRESSURE: 161 MMHG | DIASTOLIC BLOOD PRESSURE: 86 MMHG | HEART RATE: 64 BPM | TEMPERATURE: 98.1 F

## 2024-01-15 DIAGNOSIS — J06.9 VIRAL URI WITH COUGH: Primary | ICD-10-CM

## 2024-01-15 PROCEDURE — 99213 OFFICE O/P EST LOW 20 MIN: CPT

## 2024-01-15 RX ORDER — GUAIFENESIN 600 MG/1
600 TABLET, EXTENDED RELEASE ORAL 2 TIMES DAILY
Qty: 30 TABLET | Refills: 0 | Status: SHIPPED | OUTPATIENT
Start: 2024-01-15 | End: 2024-01-30

## 2024-01-15 RX ORDER — CETIRIZINE HYDROCHLORIDE 10 MG/1
10 TABLET ORAL DAILY
Qty: 30 TABLET | Refills: 0 | Status: SHIPPED | OUTPATIENT
Start: 2024-01-15 | End: 2024-02-14

## 2024-01-15 RX ORDER — FLUTICASONE PROPIONATE 50 MCG
2 SPRAY, SUSPENSION (ML) NASAL DAILY
Qty: 16 G | Refills: 0 | Status: SHIPPED | OUTPATIENT
Start: 2024-01-15

## 2024-01-15 RX ORDER — CITALOPRAM 20 MG/1
20 TABLET ORAL DAILY
COMMUNITY
Start: 2024-01-10

## 2024-01-15 ASSESSMENT — ENCOUNTER SYMPTOMS
NAUSEA: 0
DIARRHEA: 0
COUGH: 1
EYE REDNESS: 0
EYE DISCHARGE: 0
TROUBLE SWALLOWING: 0
RHINORRHEA: 1
SORE THROAT: 0
VOMITING: 0
SHORTNESS OF BREATH: 0

## 2024-01-15 ASSESSMENT — PAIN - FUNCTIONAL ASSESSMENT: PAIN_FUNCTIONAL_ASSESSMENT: NONE - DENIES PAIN

## 2024-01-15 NOTE — DISCHARGE INSTRUCTIONS
Symptoms may be present for up to 7 to 10 days.  Use Zyrtec, Flonase, and Mucinex.  Can use other cough suppressant.  Should have good hand hygiene and cover mouth when coughing.  Use over-the-counter Tylenol and Motrin for pain or fever.  Should follow-up with PCP in 3 to 5 days and worsening symptoms.

## 2024-01-15 NOTE — ED PROVIDER NOTES
[facility] URGENT CARE  Urgent Care Encounter      CHIEF COMPLAINT       Chief Complaint   Patient presents with    Cough    Headache    Nasal Congestion       Nurses Notes reviewed and I agree except as noted in the HPI.  HISTORY OF PRESENT ILLNESS   Yeny Fine is a 73 y.o. female who presents urgent care for evaluation of cough, headache, and nasal congestion.  Patient reports onset of symptoms several days ago.  Mentions she was seen on 1/10/2024 at her primary care provider's office and given a prescription for Tessalon pearls.  She reports that medication is not helping her symptoms.  Denies any fevers, fatigue, chills.  Does endorse in nasal congestion and rhinorrhea.  Headache is just generalized.  Cough nonproductive.    REVIEW OF SYSTEMS     Review of Systems   Constitutional:  Positive for chills, fatigue and fever. Negative for diaphoresis.   HENT:  Positive for congestion and rhinorrhea. Negative for ear pain, sore throat and trouble swallowing.    Eyes:  Negative for discharge and redness.   Respiratory:  Positive for cough. Negative for shortness of breath.    Cardiovascular:  Negative for chest pain.   Gastrointestinal:  Negative for diarrhea, nausea and vomiting.   Genitourinary:  Negative for decreased urine volume.   Musculoskeletal:  Negative for neck pain and neck stiffness.   Skin:  Negative for rash.   Neurological:  Positive for headaches.   Hematological:  Negative for adenopathy.   Psychiatric/Behavioral:  Negative for sleep disturbance.        PAST MEDICAL HISTORY         Diagnosis Date    Arthritis     Asthma     seasonal    Depression     Fibromyalgia     GERD (gastroesophageal reflux disease)     Hx of blood clots 2003    DVT after arthroscopy right knee    Hyperlipidemia     Hypertension     Hypothyroidism     Restless legs syndrome     Type II or unspecified type diabetes mellitus without mention of complication, not stated as uncontrolled     Unspecified sleep apnea      no CPAP       SURGICAL HISTORY     Patient  has a past surgical history that includes sinus surgery (1994); Tonsillectomy (1955); Hysterectomy (1992); Knee arthroscopy (Right, 2003); Vena Cava Filter Placement (2003); Cardiac catheterization (2006??); Colonoscopy; joint replacement (Right, 2013); eye surgery (feb. 2016); skin biopsy; Upper gastrointestinal endoscopy (07/25/2017); Colonoscopy (08/11/2017); pr bx of breast,vacuum asst,image guide (Right, 09/26/2013); and Breast lumpectomy (Right, 10/17/2013).    CURRENT MEDICATIONS       Previous Medications    ACETAMINOPHEN (TYLENOL) 325 MG TABLET    Take 650 mg by mouth every 6 hours as needed for Pain    ATENOLOL (TENORMIN) 25 MG TABLET    Take 1 tablet by mouth daily    ATORVASTATIN (LIPITOR) 10 MG TABLET    Take 1 tablet by mouth daily    CITALOPRAM (CELEXA) 20 MG TABLET    Take 1 tablet by mouth daily    DIPHENOXYLATE-ATROPINE (LOMOTIL) 2.5-0.025 MG PER TABLET    Take 1 tablet by mouth 4 times daily as needed for Diarrhea.    FUROSEMIDE (LASIX) 20 MG TABLET    Take 20 mg by mouth daily    GLIMEPIRIDE (AMARYL) 1 MG TABLET    Take 1 tablet by mouth every morning (before breakfast)    LACTOBACILLUS (ACIDOPHILUS PO)    Take by mouth daily Indications: 1Billion 3 daily    LEVOTHYROXINE (SYNTHROID) 25 MCG TABLET    Take 1 tablet by mouth Daily    LOSARTAN (COZAAR) 50 MG TABLET    TAKE 1 TABLET BY MOUTH EVERY DAY    METOPROLOL TARTRATE (LOPRESSOR) 25 MG TABLET    Take 1 tablet by mouth 2 times daily Takes 1/2 tab BID    MULTIPLE VITAMIN PO    Take by mouth daily    MULTIPLE VITAMINS-MINERALS (EYE VITAMINS PO)    Take by mouth daily    ONDANSETRON (ZOFRAN ODT) 4 MG DISINTEGRATING TABLET    Take 1 tablet by mouth every 8 hours as needed for Nausea or Vomiting    ONDANSETRON (ZOFRAN-ODT) 4 MG DISINTEGRATING TABLET    Take 1 tablet by mouth 3 times daily as needed for Nausea or Vomiting    PANTOPRAZOLE (PROTONIX) 40 MG TABLET    Take 1 tablet by mouth every morning

## 2024-01-25 ENCOUNTER — HOSPITAL ENCOUNTER (EMERGENCY)
Age: 74
Discharge: HOME | End: 2024-01-25
Payer: MEDICARE

## 2024-01-25 VITALS
DIASTOLIC BLOOD PRESSURE: 77 MMHG | OXYGEN SATURATION: 97 % | BODY MASS INDEX: 34.91 KG/M2 | SYSTOLIC BLOOD PRESSURE: 181 MMHG | HEART RATE: 80 BPM | TEMPERATURE: 98.7 F | HEIGHT: 63 IN | RESPIRATION RATE: 18 BRPM | WEIGHT: 197 LBS

## 2024-01-25 DIAGNOSIS — R05.1 ACUTE COUGH: ICD-10-CM

## 2024-01-25 DIAGNOSIS — U07.1 COVID-19: Primary | ICD-10-CM

## 2024-01-25 LAB — SARS-COV-2 RDRP RESP QL NAA+PROBE: DETECTED

## 2024-01-25 PROCEDURE — 99213 OFFICE O/P EST LOW 20 MIN: CPT

## 2024-01-25 PROCEDURE — 99213 OFFICE O/P EST LOW 20 MIN: CPT | Performed by: NURSE PRACTITIONER

## 2024-01-25 PROCEDURE — 87635 SARS-COV-2 COVID-19 AMP PRB: CPT

## 2024-01-25 RX ORDER — PREDNISONE 20 MG/1
20 TABLET ORAL DAILY
Qty: 5 TABLET | Refills: 0 | Status: SHIPPED | OUTPATIENT
Start: 2024-01-25 | End: 2024-01-30

## 2024-01-25 RX ORDER — BENZONATATE 200 MG/1
200 CAPSULE ORAL 3 TIMES DAILY PRN
Qty: 30 CAPSULE | Refills: 0 | Status: SHIPPED | OUTPATIENT
Start: 2024-01-25 | End: 2024-02-04

## 2024-01-25 ASSESSMENT — PAIN - FUNCTIONAL ASSESSMENT: PAIN_FUNCTIONAL_ASSESSMENT: NONE - DENIES PAIN

## 2024-01-25 ASSESSMENT — ENCOUNTER SYMPTOMS
RHINORRHEA: 1
COUGH: 1
SHORTNESS OF BREATH: 0
SORE THROAT: 0
VOMITING: 0
NAUSEA: 0

## 2024-01-25 NOTE — ED TRIAGE NOTES
Pt to [facility] ambulatory with a cough, runny nose, and headache.  This started 2 weeks ago.  No fevers.

## 2024-01-25 NOTE — ED PROVIDER NOTES
Colonoscopy; joint replacement (Right, 2013); eye surgery (feb. 2016); skin biopsy; Upper gastrointestinal endoscopy (07/25/2017); Colonoscopy (08/11/2017); pr bx of breast,vacuum asst,image guide (Right, 09/26/2013); and Breast lumpectomy (Right, 10/17/2013).    CURRENT MEDICATIONS       Previous Medications    ACETAMINOPHEN (TYLENOL) 325 MG TABLET    Take 650 mg by mouth every 6 hours as needed for Pain    ATENOLOL (TENORMIN) 25 MG TABLET    Take 1 tablet by mouth daily    ATORVASTATIN (LIPITOR) 10 MG TABLET    Take 1 tablet by mouth daily    CETIRIZINE (ZYRTEC) 10 MG TABLET    Take 1 tablet by mouth daily    CITALOPRAM (CELEXA) 20 MG TABLET    Take 1 tablet by mouth daily    FLUTICASONE (FLONASE) 50 MCG/ACT NASAL SPRAY    2 sprays by Each Nostril route daily    GUAIFENESIN (MUCINEX) 600 MG EXTENDED RELEASE TABLET    Take 1 tablet by mouth 2 times daily for 15 days    LACTOBACILLUS (ACIDOPHILUS PO)    Take by mouth daily Indications: 1Billion 3 daily    LEVOTHYROXINE (SYNTHROID) 25 MCG TABLET    Take 1 tablet by mouth Daily    LOSARTAN (COZAAR) 50 MG TABLET    TAKE 1 TABLET BY MOUTH EVERY DAY    METOPROLOL TARTRATE (LOPRESSOR) 25 MG TABLET    Take 1 tablet by mouth 2 times daily Takes 1/2 tab BID    MULTIPLE VITAMIN PO    Take by mouth daily    MULTIPLE VITAMINS-MINERALS (EYE VITAMINS PO)    Take by mouth daily    ONDANSETRON (ZOFRAN ODT) 4 MG DISINTEGRATING TABLET    Take 1 tablet by mouth every 8 hours as needed for Nausea or Vomiting    ONDANSETRON (ZOFRAN-ODT) 4 MG DISINTEGRATING TABLET    Take 1 tablet by mouth 3 times daily as needed for Nausea or Vomiting    PANTOPRAZOLE (PROTONIX) 40 MG TABLET    Take 1 tablet by mouth every morning (before breakfast)    PRASUGREL (EFFIENT) 5 MG TABS    TAKE 1 TABLET BY MOUTH EVERY DAY    SEMAGLUTIDE 7 MG TABS    Take 7 mg by mouth daily    UNABLE TO FIND    Unkers ointment       ALLERGIES     Patient is is allergic to cephalosporins and morphine.    Patients    Neurological:      Mental Status: She is alert and oriented to person, place, and time.         DIAGNOSTIC RESULTS     Labs:  Results for orders placed or performed during the hospital encounter of 01/25/24   COVID-19, Rapid   Result Value Ref Range    SARS-CoV-2, EDDIE DETECTED (AA) NOT DETECTED       IMAGING:  None    EKG:  None    URGENT CARE COURSE:     Vitals:    01/25/24 1202   BP: (!) 181/77   Pulse: 80   Resp: 18   Temp: 98.7 °F (37.1 °C)   TempSrc: Oral   SpO2: 97%   Weight: 89.4 kg (197 lb)   Height: 1.6 m (5' 3\")       Medications - No data to display       PROCEDURES:  None    FINAL IMPRESSION      1. COVID-19    2. Acute cough      DISPOSITION/ PLAN   DISPOSITION Decision To Discharge 01/25/2024 12:26:12 PM     Discussed with the patient that exam is consistent with a viral illness and that I believe testing for coronavirus is warranted at this time.  Test was completed during visit today and patient is advised to quarantine until symptoms improve.  Advised to rest and hydrate and use over-the-counter antipyretic medications as needed for fever or body aches.  Patient is advised to present to the ER for any worsening symptoms and is agreeable to plan as discussed.    Tessalon Perrles & prednisone for cough control.      PATIENT REFERRED TO:  Tracy Jon APRN - [facility]  [address] / [facility] [id number]      DISCHARGE MEDICATIONS:  New Prescriptions    BENZONATATE (TESSALON) 200 MG CAPSULE    Take 1 capsule by mouth 3 times daily as needed for Cough    PREDNISONE (DELTASONE) 20 MG TABLET    Take 1 tablet by mouth daily for 5 days       Discontinued Medications    DIPHENOXYLATE-ATROPINE (LOMOTIL) 2.5-0.025 MG PER TABLET    Take 1 tablet by mouth 4 times daily as needed for Diarrhea.    FUROSEMIDE (LASIX) 20 MG TABLET    Take 20 mg by mouth daily    GLIMEPIRIDE (AMARYL) 1 MG TABLET    Take 1 tablet by mouth every morning (before breakfast)    POTASSIUM CHLORIDE (KLOR-CON) 20 MEQ PACKET

## 2024-02-24 ENCOUNTER — HOSPITAL ENCOUNTER (EMERGENCY)
Age: 74
Discharge: HOME | DRG: 659 | End: 2024-02-24
Attending: EMERGENCY MEDICINE
Payer: MEDICARE

## 2024-02-24 ENCOUNTER — APPOINTMENT (OUTPATIENT)
Dept: CT IMAGING | Age: 74
DRG: 659 | End: 2024-02-24
Payer: MEDICARE

## 2024-02-24 VITALS
BODY MASS INDEX: 34.72 KG/M2 | OXYGEN SATURATION: 96 % | SYSTOLIC BLOOD PRESSURE: 127 MMHG | HEART RATE: 74 BPM | TEMPERATURE: 98.8 F | WEIGHT: 196 LBS | DIASTOLIC BLOOD PRESSURE: 59 MMHG | RESPIRATION RATE: 18 BRPM

## 2024-02-24 DIAGNOSIS — N20.0 LEFT NEPHROLITHIASIS: Primary | ICD-10-CM

## 2024-02-24 DIAGNOSIS — Q62.11 HYDRONEPHROSIS WITH URETEROPELVIC JUNCTION (UPJ) OBSTRUCTION: ICD-10-CM

## 2024-02-24 LAB
BASOPHILS ABSOLUTE: 0 THOU/MM3 (ref 0–0.1)
BASOPHILS NFR BLD AUTO: 0.3 %
BILIRUB UR STRIP.AUTO-MCNC: NEGATIVE MG/DL
BUN BLD-MCNC: 21 MG/DL (ref 8–26)
CHARACTER UR: CLEAR
CHLORIDE BLD-SCNC: 100 MEQ/L (ref 98–109)
CO2 BLD CALC-SCNC: 27 MEQ/L (ref 23–33)
COLOR: YELLOW
CREAT BLD-MCNC: 1.2 MG/DL (ref 0.5–1.2)
EOSINOPHIL NFR BLD AUTO: 0.1 %
EOSINOPHILS ABSOLUTE: 0 THOU/MM3 (ref 0–0.4)
ERYTHROCYTE [DISTWIDTH] IN BLOOD BY AUTOMATED COUNT: 14.7 % (ref 11.5–14.5)
FLUAV AG SPEC QL: NEGATIVE
FLUBV AG SPEC QL: NEGATIVE
GFR SERPL CREATININE-BSD FRML MDRD: 47 ML/MIN/1.73M2
GLUCOSE BLD-MCNC: 179 MG/DL (ref 70–108)
GLUCOSE UR QL STRIP.AUTO: NEGATIVE MG/DL
HCT VFR BLD AUTO: 43.5 % (ref 37–47)
HGB BLD-MCNC: 14.4 GM/DL (ref 12–16)
IMM GRANULOCYTES # BLD AUTO: 0.05 THOU/MM3 (ref 0–0.07)
IMM GRANULOCYTES NFR BLD AUTO: 0.4 %
KETONES UR QL STRIP.AUTO: NEGATIVE
LYMPHOCYTES ABSOLUTE: 1.3 THOU/MM3 (ref 1–4.8)
LYMPHOCYTES NFR BLD AUTO: 11.9 %
MCH RBC QN AUTO: 29.4 PG (ref 27–31)
MCHC RBC AUTO-ENTMCNC: 33.1 GM/DL (ref 33–37)
MCV RBC AUTO: 89 FL (ref 81–99)
MONOCYTES ABSOLUTE: 0.9 THOU/MM3 (ref 0.4–1.3)
MONOCYTES NFR BLD AUTO: 8.2 %
NEUTROPHILS NFR BLD AUTO: 79.1 %
NITRITE UR QL STRIP.AUTO: NEGATIVE
NRBC BLD AUTO-RTO: 0 /100 WBC
PH UR STRIP.AUTO: 7 [PH] (ref 5–9)
PLATELET # BLD AUTO: 208 THOU/MM3 (ref 130–400)
PMV BLD AUTO: 10.9 FL (ref 7.4–10.4)
POTASSIUM BLD-SCNC: 4.1 MEQ/L (ref 3.5–4.9)
PROT UR STRIP.AUTO-MCNC: 30 MG/DL
RBC # BLD AUTO: 4.89 MILL/MM3 (ref 4.2–5.4)
RBC #/AREA URNS HPF: ABNORMAL /[HPF]
SEGMENTED NEUTROPHILS ABSOLUTE COUNT: 8.9 THOU/MM3 (ref 1.8–7.7)
SODIUM BLD-SCNC: 138 MEQ/L (ref 138–146)
SP GR UR STRIP.AUTO: 1.02 (ref 1–1.03)
UROBILINOGEN, URINE: 0.2 EU/DL (ref 0.2–1)
WBC # BLD AUTO: 11.2 THOU/MM3 (ref 4.8–10.8)
WBC #/AREA URNS HPF: NEGATIVE /[HPF]

## 2024-02-24 PROCEDURE — 87804 INFLUENZA ASSAY W/OPTIC: CPT

## 2024-02-24 PROCEDURE — 96372 THER/PROPH/DIAG INJ SC/IM: CPT

## 2024-02-24 PROCEDURE — 99215 OFFICE O/P EST HI 40 MIN: CPT

## 2024-02-24 PROCEDURE — 99214 OFFICE O/P EST MOD 30 MIN: CPT | Performed by: EMERGENCY MEDICINE

## 2024-02-24 PROCEDURE — 96361 HYDRATE IV INFUSION ADD-ON: CPT

## 2024-02-24 PROCEDURE — 2580000003 HC RX 258

## 2024-02-24 PROCEDURE — 81003 URINALYSIS AUTO W/O SCOPE: CPT

## 2024-02-24 PROCEDURE — 74176 CT ABD & PELVIS W/O CONTRAST: CPT

## 2024-02-24 PROCEDURE — 80051 ELECTROLYTE PANEL: CPT

## 2024-02-24 PROCEDURE — 84520 ASSAY OF UREA NITROGEN: CPT

## 2024-02-24 PROCEDURE — 82947 ASSAY GLUCOSE BLOOD QUANT: CPT

## 2024-02-24 PROCEDURE — 6370000000 HC RX 637 (ALT 250 FOR IP): Performed by: EMERGENCY MEDICINE

## 2024-02-24 PROCEDURE — 36415 COLL VENOUS BLD VENIPUNCTURE: CPT

## 2024-02-24 PROCEDURE — 96360 HYDRATION IV INFUSION INIT: CPT

## 2024-02-24 PROCEDURE — 6360000002 HC RX W HCPCS

## 2024-02-24 PROCEDURE — 99284 EMERGENCY DEPT VISIT MOD MDM: CPT

## 2024-02-24 PROCEDURE — 82565 ASSAY OF CREATININE: CPT

## 2024-02-24 RX ORDER — 0.9 % SODIUM CHLORIDE 0.9 %
1000 INTRAVENOUS SOLUTION INTRAVENOUS ONCE
Status: COMPLETED | OUTPATIENT
Start: 2024-02-24 | End: 2024-02-24

## 2024-02-24 RX ORDER — ONDANSETRON 4 MG/1
4 TABLET, ORALLY DISINTEGRATING ORAL ONCE
Status: COMPLETED | OUTPATIENT
Start: 2024-02-24 | End: 2024-02-24

## 2024-02-24 RX ORDER — KETOROLAC TROMETHAMINE 30 MG/ML
30 INJECTION, SOLUTION INTRAMUSCULAR; INTRAVENOUS ONCE
Status: COMPLETED | OUTPATIENT
Start: 2024-02-24 | End: 2024-02-24

## 2024-02-24 RX ORDER — KETOROLAC TROMETHAMINE 10 MG/1
10 TABLET, FILM COATED ORAL EVERY 6 HOURS PRN
Qty: 16 TABLET | Refills: 0 | Status: SHIPPED | OUTPATIENT
Start: 2024-02-24 | End: 2024-02-28

## 2024-02-24 RX ORDER — ONDANSETRON 4 MG/1
4 TABLET, ORALLY DISINTEGRATING ORAL 3 TIMES DAILY PRN
Qty: 21 TABLET | Refills: 0 | Status: SHIPPED | OUTPATIENT
Start: 2024-02-24

## 2024-02-24 RX ORDER — CIPROFLOXACIN 500 MG/1
500 TABLET, FILM COATED ORAL 2 TIMES DAILY
Qty: 10 TABLET | Refills: 0 | Status: ON HOLD | OUTPATIENT
Start: 2024-02-24 | End: 2024-02-26 | Stop reason: HOSPADM

## 2024-02-24 RX ORDER — TAMSULOSIN HYDROCHLORIDE 0.4 MG/1
0.4 CAPSULE ORAL DAILY
Qty: 7 CAPSULE | Refills: 0 | Status: ON HOLD | OUTPATIENT
Start: 2024-02-24 | End: 2024-02-26

## 2024-02-24 RX ADMIN — ONDANSETRON 4 MG: 4 TABLET, ORALLY DISINTEGRATING ORAL at 11:09

## 2024-02-24 RX ADMIN — SODIUM CHLORIDE 1000 ML: 9 INJECTION, SOLUTION INTRAVENOUS at 15:45

## 2024-02-24 RX ADMIN — KETOROLAC TROMETHAMINE 30 MG: 30 INJECTION, SOLUTION INTRAMUSCULAR at 14:20

## 2024-02-24 ASSESSMENT — ENCOUNTER SYMPTOMS
SHORTNESS OF BREATH: 0
RHINORRHEA: 1
NAUSEA: 1
VOMITING: 1
COUGH: 0

## 2024-02-24 ASSESSMENT — PAIN - FUNCTIONAL ASSESSMENT: PAIN_FUNCTIONAL_ASSESSMENT: NONE - DENIES PAIN

## 2024-02-24 NOTE — ED PROVIDER NOTES
[facility] EMERGENCY DEPT  Urgent Care Encounter       CHIEF COMPLAINT       Chief Complaint   Patient presents with    Flank Pain     Left     Emesis       Nurses Notes reviewed and I agree except as noted in the HPI.  HISTORY OF PRESENT ILLNESS   Yeny Fine is a 73 y.o. female who presents for complaints of left-sided back pain that began yesterday.  Patient states that she helped her  out of bed yesterday and may have strained a muscle.  She states the back pain increases when she lays down but also increases with movement.  She also started vomiting yesterday evening.  States she has vomited 6 times in the past 24 hours.  No diarrhea.  No abdominal pain.  No fevers.  Patient states she has recently had a urinary tract infection    HPI    REVIEW OF SYSTEMS     Review of Systems   Constitutional:  Positive for activity change. Negative for fatigue and fever.   HENT:  Positive for rhinorrhea.    Respiratory:  Negative for cough and shortness of breath.    Gastrointestinal:  Positive for nausea and vomiting.   Genitourinary:  Positive for dysuria and flank pain. Negative for frequency, hematuria and urgency.   Musculoskeletal:  Positive for arthralgias (left back pain).       PAST MEDICAL HISTORY         Diagnosis Date    Arthritis     Asthma     seasonal    Depression     Fibromyalgia     GERD (gastroesophageal reflux disease)     Hx of blood clots 2003    DVT after arthroscopy right knee    Hyperlipidemia     Hypertension     Hypothyroidism     Restless legs syndrome     Type II or unspecified type diabetes mellitus without mention of complication, not stated as uncontrolled     Unspecified sleep apnea     no CPAP       SURGICALHISTORY     Patient  has a past surgical history that includes sinus surgery (1994); Tonsillectomy (1955); Hysterectomy (1992); Knee arthroscopy (Right, 2003); Vena Cava Filter Placement (2003); Cardiac catheterization (2006??); Colonoscopy; joint replacement (Right, 2013); eye

## 2024-02-24 NOTE — ED NOTES
Patient resting comfortably on cot. No complaints at this time      Janet Garces, RN  02/24/24 1200

## 2024-02-24 NOTE — ED NOTES
Assumed care of pt at this time. Pt to this ED from  d/t flank pain and emesis. In stable condition

## 2024-02-24 NOTE — ED PROVIDER NOTES
[place] EMERGENCY DEPARTMENT    EMERGENCY MEDICINE     Patient Name: Yeny Fine  MRN: [id number]  YOB: 1950  Date of Evaluation: 2/24/2024  Treating Resident Physician: Silver Thompson DO  Supervising Physician: Dr. Jame Dent MD    CHIEF COMPLAINT       Chief Complaint   Patient presents with    Flank Pain     Left     Emesis       HISTORY OF PRESENT ILLNESS    HPI    History obtained from the patient and chart review.    PMH: Type II DM, GERD, hypertension, hyperlipidemia, CAD s/p PCI    Yeny Fine is a 73 y.o. female who presents to the emergency department from [facility] urgent care by private vehicle for evaluation of left flank pain.  Started last night, associated nausea and vomiting.  No radiation, denies trauma or injury.  No fevers, chills, chest pain, shortness of breath, urinary symptoms.    Seen at urgent care, BMP WNL, creatinine 1.2.  CBC benign, WBC 11.2.  Influenza A/B negative.  UA shows large blood, negative nitrites and leukocytes.  Was transferred to [facility] for further evaluation of kidney stones.    Patient denies history of kidney stones, however prior CTAP on 3/13/2023 showed a 3.6 mm stone in the upper pole of the left kidney at that time.    Pertinent previous and/or external records reviewed    REVIEW OF SYSTEMS   Review of Systems  Negative unless documented in HPI    PAST MEDICAL AND SURGICAL HISTORY     Past Medical History:   Diagnosis Date    Arthritis     Asthma     seasonal    Depression     Fibromyalgia     GERD (gastroesophageal reflux disease)     Hx of blood clots 2003    DVT after arthroscopy right knee    Hyperlipidemia     Hypertension     Hypothyroidism     Restless legs syndrome     Type II or unspecified type diabetes mellitus without mention of complication, not stated as uncontrolled     Unspecified sleep apnea     no CPAP       Past Surgical History:   Procedure Laterality Date    BREAST LUMPECTOMY Right 10/17/2013    radial scar removal

## 2024-02-24 NOTE — DISCHARGE INSTRUCTIONS
Start taking antibiotic (ciprofloxacin), pain medication (Toradol), Flomax, and Zofran for left-sided kidney stone.  Scheduled appointment with urology on Monday, 2/26/2024 at 10 AM.  If start developing fever, uncontrollable pain/nausea/vomiting, can return to emergency department for further evaluation.

## 2024-02-24 NOTE — ED PROVIDER NOTES
[facility] EMERGENCY DEPT  Emergency Department  Attending Physician Attestation       Patient was seen by  ER/IM Resident Dr. Silver Thompson and I supervised/co-managed the case    I performed a history and physical examination of the patient and discussed management with the Resident/Trainee. I reviewed the Resident's note and agree with the documented findings and plan of care. Any areas of disagreement are noted on the chart. I was personally present for the key portions of any procedures. I have documented in the chart those procedures where I was not present during the key portions. I have reviewed the emergency nurses triage note. I agree with the chief complaint, past medical history, past surgical history, allergies, medications, social and family history as documented unless otherwise noted below. For Physician Assistant/ Nurse Practitioner cases I have personally evaluated this patient and have completed at least one if not all key elements of the E/M (history, physical exam, and MDM). Additional findings are as noted.       Chief Complaint      Yeny Fine is a 73 y.o. female who presented to the emergency room due to left flank pain since last night about 9:30 PM.  Pain is constant and scale of 10/10 and has been vomiting x 6 overnight.  The patient did not have any fever no chills no dysuria frequency hematuria.  No chest pain or shortness of breath      System Problem List     Patient Active Problem List   Diagnosis    Depression    Type II or unspecified type diabetes mellitus without mention of complication, not stated as uncontrolled    GERD (gastroesophageal reflux disease)    Hypertension    Asthma    Hyperlipidemia    Personal history of DVT (deep vein thrombosis)    Fibromyalgia    Obesity (BMI 30-39.9)    Radial scar of breast    Obstructive sleep apnea of adult       Pertinent Physical Exam:    INITIAL VITALS: BP (!) 127/59   Pulse 74   Temp 98.8 °F (37.1 °C) (Oral)   Resp 18

## 2024-02-26 ENCOUNTER — APPOINTMENT (OUTPATIENT)
Dept: GENERAL RADIOLOGY | Age: 74
DRG: 659 | End: 2024-02-26
Attending: UROLOGY
Payer: MEDICARE

## 2024-02-26 ENCOUNTER — HOSPITAL ENCOUNTER (INPATIENT)
Age: 74
LOS: 2 days | Discharge: HOME | DRG: 659 | End: 2024-02-28
Attending: UROLOGY
Payer: MEDICARE

## 2024-02-26 ENCOUNTER — ANESTHESIA EVENT (OUTPATIENT)
Dept: OPERATING ROOM | Age: 74
DRG: 659 | End: 2024-02-26
Payer: MEDICARE

## 2024-02-26 ENCOUNTER — OFFICE VISIT (OUTPATIENT)
Dept: UROLOGY | Age: 74
End: 2024-02-26
Payer: MEDICARE

## 2024-02-26 ENCOUNTER — ANESTHESIA (OUTPATIENT)
Dept: OPERATING ROOM | Age: 74
DRG: 659 | End: 2024-02-26
Payer: MEDICARE

## 2024-02-26 VITALS
HEART RATE: 78 BPM | DIASTOLIC BLOOD PRESSURE: 68 MMHG | SYSTOLIC BLOOD PRESSURE: 94 MMHG | WEIGHT: 196 LBS | BODY MASS INDEX: 34.73 KG/M2 | HEIGHT: 63 IN | TEMPERATURE: 97.7 F

## 2024-02-26 DIAGNOSIS — N13.30 HYDRONEPHROSIS, LEFT: ICD-10-CM

## 2024-02-26 DIAGNOSIS — J96.01 ACUTE RESPIRATORY FAILURE WITH HYPOXIA (HCC): Primary | ICD-10-CM

## 2024-02-26 DIAGNOSIS — N20.0 KIDNEY STONE: ICD-10-CM

## 2024-02-26 DIAGNOSIS — N17.9 ACUTE KIDNEY INJURY (HCC): ICD-10-CM

## 2024-02-26 DIAGNOSIS — N20.1 CALCULUS OF PROXIMAL LEFT URETER: Primary | ICD-10-CM

## 2024-02-26 DIAGNOSIS — R10.9 LEFT FLANK PAIN: ICD-10-CM

## 2024-02-26 PROBLEM — N39.0 URINARY TRACT INFECTION: Status: ACTIVE | Noted: 2024-02-26

## 2024-02-26 LAB
ANION GAP SERPL CALC-SCNC: 16 MEQ/L (ref 8–16)
BASOPHILS ABSOLUTE: 0 THOU/MM3 (ref 0–0.1)
BASOPHILS NFR BLD AUTO: 0.1 %
BILIRUBIN URINE: ABNORMAL
BLOOD URINE, POC: ABNORMAL
BUN SERPL-MCNC: 37 MG/DL (ref 7–22)
CALCIUM SERPL-MCNC: 8.6 MG/DL (ref 8.5–10.5)
CHARACTER, URINE: ABNORMAL
CHLORIDE SERPL-SCNC: 102 MEQ/L (ref 98–111)
CO2 SERPL-SCNC: 21 MEQ/L (ref 23–33)
COLOR, URINE: ABNORMAL
CREAT SERPL-MCNC: 1.8 MG/DL (ref 0.4–1.2)
DEPRECATED RDW RBC AUTO: 48.6 FL (ref 35–45)
EOSINOPHIL NFR BLD AUTO: 0.1 %
EOSINOPHILS ABSOLUTE: 0 THOU/MM3 (ref 0–0.4)
ERYTHROCYTE [DISTWIDTH] IN BLOOD BY AUTOMATED COUNT: 14.6 % (ref 11.5–14.5)
GFR SERPL CREATININE-BSD FRML MDRD: 29 ML/MIN/1.73M2
GLUCOSE BLD STRIP.AUTO-MCNC: 168 MG/DL (ref 70–108)
GLUCOSE BLD STRIP.AUTO-MCNC: 203 MG/DL (ref 70–108)
GLUCOSE SERPL-MCNC: 177 MG/DL (ref 70–108)
GLUCOSE URINE: NEGATIVE MG/DL
HCT VFR BLD AUTO: 38.2 % (ref 37–47)
HGB BLD-MCNC: 12 GM/DL (ref 12–16)
IMM GRANULOCYTES # BLD AUTO: 0.26 THOU/MM3 (ref 0–0.07)
IMM GRANULOCYTES NFR BLD AUTO: 2.6 %
KETONES, URINE: NEGATIVE
LACTIC ACID, SEPSIS: 1.5 MMOL/L (ref 0.5–1.9)
LACTIC ACID, SEPSIS: 1.9 MMOL/L (ref 0.5–1.9)
LACTIC ACID, SEPSIS: 3.2 MMOL/L (ref 0.5–1.9)
LEUKOCYTE CLUMPS, URINE: ABNORMAL
LYMPHOCYTES ABSOLUTE: 0.3 THOU/MM3 (ref 1–4.8)
LYMPHOCYTES NFR BLD AUTO: 3.4 %
MCH RBC QN AUTO: 28.4 PG (ref 26–33)
MCHC RBC AUTO-ENTMCNC: 31.4 GM/DL (ref 32.2–35.5)
MCV RBC AUTO: 90.5 FL (ref 81–99)
MONOCYTES ABSOLUTE: 0.1 THOU/MM3 (ref 0.4–1.3)
MONOCYTES NFR BLD AUTO: 1 %
NEUTROPHILS NFR BLD AUTO: 92.8 %
NITRITE, URINE: NEGATIVE
NRBC BLD AUTO-RTO: 0 /100 WBC
PH, URINE: 5.5 (ref 5–9)
PLATELET # BLD AUTO: 92 THOU/MM3 (ref 130–400)
PMV BLD AUTO: 11.3 FL (ref 9.4–12.4)
POTASSIUM SERPL-SCNC: 4.5 MEQ/L (ref 3.5–5.2)
PROTEIN, URINE: 100 MG/DL
RBC # BLD AUTO: 4.22 MILL/MM3 (ref 4.2–5.4)
SCAN OF BLOOD SMEAR: NORMAL
SEGMENTED NEUTROPHILS ABSOLUTE COUNT: 9.4 THOU/MM3 (ref 1.8–7.7)
SODIUM SERPL-SCNC: 139 MEQ/L (ref 135–145)
SPECIFIC GRAVITY, URINE: >= 1.03 (ref 1–1.03)
UROBILINOGEN, URINE: 0.2 EU/DL (ref 0–1)
WBC # BLD AUTO: 10.1 THOU/MM3 (ref 4.8–10.8)

## 2024-02-26 PROCEDURE — 99204 OFFICE O/P NEW MOD 45 MIN: CPT

## 2024-02-26 PROCEDURE — 36415 COLL VENOUS BLD VENIPUNCTURE: CPT

## 2024-02-26 PROCEDURE — 0T778DZ DILATION OF LEFT URETER WITH INTRALUMINAL DEVICE, VIA NATURAL OR ARTIFICIAL OPENING ENDOSCOPIC: ICD-10-PCS | Performed by: UROLOGY

## 2024-02-26 PROCEDURE — 3074F SYST BP LT 130 MM HG: CPT

## 2024-02-26 PROCEDURE — 7100000011 HC PHASE II RECOVERY - ADDTL 15 MIN: Performed by: UROLOGY

## 2024-02-26 PROCEDURE — 6360000002 HC RX W HCPCS: Performed by: UROLOGY

## 2024-02-26 PROCEDURE — 3600000012 HC SURGERY LEVEL 2 ADDTL 15MIN: Performed by: UROLOGY

## 2024-02-26 PROCEDURE — C2617 STENT, NON-COR, TEM W/O DEL: HCPCS | Performed by: UROLOGY

## 2024-02-26 PROCEDURE — 87077 CULTURE AEROBIC IDENTIFY: CPT

## 2024-02-26 PROCEDURE — 2580000003 HC RX 258: Performed by: PHYSICIAN ASSISTANT

## 2024-02-26 PROCEDURE — 71045 X-RAY EXAM CHEST 1 VIEW: CPT

## 2024-02-26 PROCEDURE — 3078F DIAST BP <80 MM HG: CPT

## 2024-02-26 PROCEDURE — 6370000000 HC RX 637 (ALT 250 FOR IP): Performed by: PHYSICIAN ASSISTANT

## 2024-02-26 PROCEDURE — 87086 URINE CULTURE/COLONY COUNT: CPT

## 2024-02-26 PROCEDURE — 81003 URINALYSIS AUTO W/O SCOPE: CPT

## 2024-02-26 PROCEDURE — 7100000010 HC PHASE II RECOVERY - FIRST 15 MIN: Performed by: UROLOGY

## 2024-02-26 PROCEDURE — 6360000002 HC RX W HCPCS: Performed by: PHYSICIAN ASSISTANT

## 2024-02-26 PROCEDURE — 80048 BASIC METABOLIC PNL TOTAL CA: CPT

## 2024-02-26 PROCEDURE — 83605 ASSAY OF LACTIC ACID: CPT

## 2024-02-26 PROCEDURE — 93005 ELECTROCARDIOGRAM TRACING: CPT | Performed by: PHYSICIAN ASSISTANT

## 2024-02-26 PROCEDURE — 87186 SC STD MICRODIL/AGAR DIL: CPT

## 2024-02-26 PROCEDURE — C1769 GUIDE WIRE: HCPCS | Performed by: UROLOGY

## 2024-02-26 PROCEDURE — 3700000000 HC ANESTHESIA ATTENDED CARE: Performed by: UROLOGY

## 2024-02-26 PROCEDURE — 1123F ACP DISCUSS/DSCN MKR DOCD: CPT

## 2024-02-26 PROCEDURE — 3700000001 HC ADD 15 MINUTES (ANESTHESIA): Performed by: UROLOGY

## 2024-02-26 PROCEDURE — 6360000002 HC RX W HCPCS: Performed by: ANESTHESIOLOGY

## 2024-02-26 PROCEDURE — 99223 1ST HOSP IP/OBS HIGH 75: CPT | Performed by: PHYSICIAN ASSISTANT

## 2024-02-26 PROCEDURE — 3600000002 HC SURGERY LEVEL 2 BASE: Performed by: UROLOGY

## 2024-02-26 PROCEDURE — 2709999900 HC NON-CHARGEABLE SUPPLY: Performed by: UROLOGY

## 2024-02-26 PROCEDURE — 2580000003 HC RX 258: Performed by: ANESTHESIOLOGY

## 2024-02-26 PROCEDURE — 2580000003 HC RX 258: Performed by: UROLOGY

## 2024-02-26 PROCEDURE — 1200000003 HC TELEMETRY R&B

## 2024-02-26 PROCEDURE — 82948 REAGENT STRIP/BLOOD GLUCOSE: CPT

## 2024-02-26 PROCEDURE — 85025 COMPLETE CBC W/AUTO DIFF WBC: CPT

## 2024-02-26 DEVICE — URETERAL STENT
Type: IMPLANTABLE DEVICE | Status: FUNCTIONAL
Brand: PERCUFLEX™ PLUS

## 2024-02-26 RX ORDER — SODIUM CHLORIDE 9 MG/ML
INJECTION, SOLUTION INTRAVENOUS PRN
Status: DISCONTINUED | OUTPATIENT
Start: 2024-02-26 | End: 2024-02-28 | Stop reason: HOSPADM

## 2024-02-26 RX ORDER — SODIUM CHLORIDE 0.9 % (FLUSH) 0.9 %
5-40 SYRINGE (ML) INJECTION PRN
Status: DISCONTINUED | OUTPATIENT
Start: 2024-02-26 | End: 2024-02-28 | Stop reason: HOSPADM

## 2024-02-26 RX ORDER — OXYBUTYNIN CHLORIDE 5 MG/1
5 TABLET, EXTENDED RELEASE ORAL DAILY
Qty: 14 TABLET | Refills: 0 | Status: SHIPPED | OUTPATIENT
Start: 2024-02-26 | End: 2024-03-11

## 2024-02-26 RX ORDER — POLYETHYLENE GLYCOL 3350 17 G/17G
17 POWDER, FOR SOLUTION ORAL DAILY PRN
Status: DISCONTINUED | OUTPATIENT
Start: 2024-02-26 | End: 2024-02-28 | Stop reason: HOSPADM

## 2024-02-26 RX ORDER — KETOROLAC TROMETHAMINE 10 MG/1
10 TABLET, FILM COATED ORAL EVERY 6 HOURS PRN
Qty: 15 TABLET | Refills: 0 | Status: SHIPPED | OUTPATIENT
Start: 2024-02-26

## 2024-02-26 RX ORDER — MAGNESIUM SULFATE IN WATER 40 MG/ML
2000 INJECTION, SOLUTION INTRAVENOUS PRN
Status: DISCONTINUED | OUTPATIENT
Start: 2024-02-26 | End: 2024-02-26

## 2024-02-26 RX ORDER — ONDANSETRON 4 MG/1
4 TABLET, ORALLY DISINTEGRATING ORAL EVERY 8 HOURS PRN
Status: DISCONTINUED | OUTPATIENT
Start: 2024-02-26 | End: 2024-02-28 | Stop reason: HOSPADM

## 2024-02-26 RX ORDER — CIPROFLOXACIN 2 MG/ML
400 INJECTION, SOLUTION INTRAVENOUS
Status: COMPLETED | OUTPATIENT
Start: 2024-02-26 | End: 2024-02-26

## 2024-02-26 RX ORDER — SODIUM CHLORIDE 9 MG/ML
INJECTION, SOLUTION INTRAVENOUS CONTINUOUS PRN
Status: DISCONTINUED | OUTPATIENT
Start: 2024-02-26 | End: 2024-02-26 | Stop reason: SDUPTHER

## 2024-02-26 RX ORDER — POTASSIUM CHLORIDE 7.45 MG/ML
10 INJECTION INTRAVENOUS PRN
Status: DISCONTINUED | OUTPATIENT
Start: 2024-02-26 | End: 2024-02-26

## 2024-02-26 RX ORDER — PROPOFOL 10 MG/ML
INJECTION, EMULSION INTRAVENOUS PRN
Status: DISCONTINUED | OUTPATIENT
Start: 2024-02-26 | End: 2024-02-26 | Stop reason: SDUPTHER

## 2024-02-26 RX ORDER — PHENAZOPYRIDINE HYDROCHLORIDE 100 MG/1
100 TABLET, FILM COATED ORAL 3 TIMES DAILY PRN
Qty: 15 TABLET | Refills: 0 | Status: SHIPPED | OUTPATIENT
Start: 2024-02-26 | End: 2024-03-02

## 2024-02-26 RX ORDER — 0.9 % SODIUM CHLORIDE 0.9 %
1000 INTRAVENOUS SOLUTION INTRAVENOUS ONCE
Status: COMPLETED | OUTPATIENT
Start: 2024-02-26 | End: 2024-02-26

## 2024-02-26 RX ORDER — LIDOCAINE HCL/PF 100 MG/5ML
SYRINGE (ML) INJECTION PRN
Status: DISCONTINUED | OUTPATIENT
Start: 2024-02-26 | End: 2024-02-26 | Stop reason: SDUPTHER

## 2024-02-26 RX ORDER — CITALOPRAM 20 MG/1
10 TABLET ORAL DAILY
Status: DISCONTINUED | OUTPATIENT
Start: 2024-02-26 | End: 2024-02-28 | Stop reason: HOSPADM

## 2024-02-26 RX ORDER — PRASUGREL 10 MG/1
5 TABLET, FILM COATED ORAL DAILY
Status: DISCONTINUED | OUTPATIENT
Start: 2024-02-26 | End: 2024-02-28 | Stop reason: HOSPADM

## 2024-02-26 RX ORDER — 0.9 % SODIUM CHLORIDE 0.9 %
500 INTRAVENOUS SOLUTION INTRAVENOUS ONCE
Status: COMPLETED | OUTPATIENT
Start: 2024-02-26 | End: 2024-02-26

## 2024-02-26 RX ORDER — CIPROFLOXACIN 2 MG/ML
400 INJECTION, SOLUTION INTRAVENOUS EVERY 24 HOURS
Status: DISCONTINUED | OUTPATIENT
Start: 2024-02-27 | End: 2024-02-26

## 2024-02-26 RX ORDER — LOSARTAN POTASSIUM 100 MG/1
100 TABLET ORAL DAILY
Status: DISCONTINUED | OUTPATIENT
Start: 2024-02-27 | End: 2024-02-28 | Stop reason: HOSPADM

## 2024-02-26 RX ORDER — SODIUM CHLORIDE 9 MG/ML
INJECTION, SOLUTION INTRAVENOUS CONTINUOUS
Status: DISCONTINUED | OUTPATIENT
Start: 2024-02-26 | End: 2024-02-28 | Stop reason: HOSPADM

## 2024-02-26 RX ORDER — ONDANSETRON 2 MG/ML
4 INJECTION INTRAMUSCULAR; INTRAVENOUS EVERY 6 HOURS PRN
Status: DISCONTINUED | OUTPATIENT
Start: 2024-02-26 | End: 2024-02-28 | Stop reason: HOSPADM

## 2024-02-26 RX ORDER — ACETAMINOPHEN 650 MG/1
650 SUPPOSITORY RECTAL EVERY 6 HOURS PRN
Status: DISCONTINUED | OUTPATIENT
Start: 2024-02-26 | End: 2024-02-28 | Stop reason: HOSPADM

## 2024-02-26 RX ORDER — INSULIN LISPRO 100 [IU]/ML
0-4 INJECTION, SOLUTION INTRAVENOUS; SUBCUTANEOUS
Status: DISCONTINUED | OUTPATIENT
Start: 2024-02-27 | End: 2024-02-28 | Stop reason: HOSPADM

## 2024-02-26 RX ORDER — PANTOPRAZOLE SODIUM 40 MG/1
40 TABLET, DELAYED RELEASE ORAL
Status: DISCONTINUED | OUTPATIENT
Start: 2024-02-27 | End: 2024-02-26

## 2024-02-26 RX ORDER — DEXTROSE MONOHYDRATE 100 MG/ML
INJECTION, SOLUTION INTRAVENOUS CONTINUOUS PRN
Status: DISCONTINUED | OUTPATIENT
Start: 2024-02-26 | End: 2024-02-28 | Stop reason: HOSPADM

## 2024-02-26 RX ORDER — POTASSIUM CHLORIDE 20 MEQ/1
40 TABLET, EXTENDED RELEASE ORAL PRN
Status: DISCONTINUED | OUTPATIENT
Start: 2024-02-26 | End: 2024-02-26

## 2024-02-26 RX ORDER — INSULIN LISPRO 100 [IU]/ML
0-4 INJECTION, SOLUTION INTRAVENOUS; SUBCUTANEOUS NIGHTLY
Status: DISCONTINUED | OUTPATIENT
Start: 2024-02-26 | End: 2024-02-28 | Stop reason: HOSPADM

## 2024-02-26 RX ORDER — ACETAMINOPHEN 325 MG/1
650 TABLET ORAL EVERY 6 HOURS PRN
Status: DISCONTINUED | OUTPATIENT
Start: 2024-02-26 | End: 2024-02-28 | Stop reason: HOSPADM

## 2024-02-26 RX ORDER — LEVOTHYROXINE SODIUM 0.03 MG/1
25 TABLET ORAL DAILY
Status: DISCONTINUED | OUTPATIENT
Start: 2024-02-27 | End: 2024-02-28 | Stop reason: HOSPADM

## 2024-02-26 RX ORDER — CIPROFLOXACIN 500 MG/1
500 TABLET, FILM COATED ORAL 2 TIMES DAILY
Qty: 14 TABLET | Refills: 0 | Status: SHIPPED | OUTPATIENT
Start: 2024-02-26 | End: 2024-03-04

## 2024-02-26 RX ORDER — FENTANYL CITRATE 50 UG/ML
INJECTION, SOLUTION INTRAMUSCULAR; INTRAVENOUS PRN
Status: DISCONTINUED | OUTPATIENT
Start: 2024-02-26 | End: 2024-02-26 | Stop reason: SDUPTHER

## 2024-02-26 RX ORDER — SODIUM CHLORIDE 0.9 % (FLUSH) 0.9 %
5-40 SYRINGE (ML) INJECTION EVERY 12 HOURS SCHEDULED
Status: DISCONTINUED | OUTPATIENT
Start: 2024-02-26 | End: 2024-02-28 | Stop reason: HOSPADM

## 2024-02-26 RX ADMIN — ONDANSETRON 4 MG: 2 INJECTION INTRAMUSCULAR; INTRAVENOUS at 18:32

## 2024-02-26 RX ADMIN — Medication 100 MG: at 16:40

## 2024-02-26 RX ADMIN — PIPERACILLIN AND TAZOBACTAM 4500 MG: 4; .5 INJECTION, POWDER, FOR SOLUTION INTRAVENOUS at 20:40

## 2024-02-26 RX ADMIN — PROPOFOL 20 MG: 10 INJECTION, EMULSION INTRAVENOUS at 16:45

## 2024-02-26 RX ADMIN — PROPOFOL 50 MG: 10 INJECTION, EMULSION INTRAVENOUS at 16:40

## 2024-02-26 RX ADMIN — SODIUM CHLORIDE: 9 INJECTION, SOLUTION INTRAVENOUS at 16:33

## 2024-02-26 RX ADMIN — FENTANYL CITRATE 25 MCG: 50 INJECTION, SOLUTION INTRAMUSCULAR; INTRAVENOUS at 16:35

## 2024-02-26 RX ADMIN — ACETAMINOPHEN 650 MG: 325 TABLET ORAL at 18:32

## 2024-02-26 RX ADMIN — CITALOPRAM 10 MG: 20 TABLET, FILM COATED ORAL at 21:36

## 2024-02-26 RX ADMIN — SODIUM CHLORIDE 500 ML: 9 INJECTION, SOLUTION INTRAVENOUS at 20:20

## 2024-02-26 RX ADMIN — CIPROFLOXACIN 400 MG: 2 INJECTION, SOLUTION INTRAVENOUS at 16:22

## 2024-02-26 RX ADMIN — CIPROFLOXACIN 400 MG: 2 INJECTION, SOLUTION INTRAVENOUS at 16:34

## 2024-02-26 RX ADMIN — FENTANYL CITRATE 25 MCG: 50 INJECTION, SOLUTION INTRAMUSCULAR; INTRAVENOUS at 16:38

## 2024-02-26 RX ADMIN — SODIUM CHLORIDE 1000 ML: 9 INJECTION, SOLUTION INTRAVENOUS at 18:32

## 2024-02-26 RX ADMIN — SODIUM CHLORIDE: 9 INJECTION, SOLUTION INTRAVENOUS at 14:30

## 2024-02-26 RX ADMIN — FENTANYL CITRATE 50 MCG: 50 INJECTION, SOLUTION INTRAMUSCULAR; INTRAVENOUS at 16:46

## 2024-02-26 ASSESSMENT — PAIN - FUNCTIONAL ASSESSMENT
PAIN_FUNCTIONAL_ASSESSMENT: NONE - DENIES PAIN
PAIN_FUNCTIONAL_ASSESSMENT: 0-10

## 2024-02-26 ASSESSMENT — PAIN SCALES - GENERAL
PAINLEVEL_OUTOF10: 0
PAINLEVEL_OUTOF10: 7

## 2024-02-26 ASSESSMENT — PAIN DESCRIPTION - DESCRIPTORS
DESCRIPTORS: ACHING
DESCRIPTORS: ACHING

## 2024-02-26 ASSESSMENT — PAIN DESCRIPTION - PAIN TYPE: TYPE: OTHER (COMMENT)

## 2024-02-26 ASSESSMENT — PAIN DESCRIPTION - LOCATION: LOCATION: HEAD

## 2024-02-26 NOTE — ANESTHESIA POSTPROCEDURE EVALUATION
Department of Anesthesiology  Postprocedure Note    Patient: Yeny Fine  MRN: [id number]  YOB: 1950  Date of evaluation: 2/26/2024    Procedure Summary       Date: 02/26/24 Room / Location: [facility]  / [facility] OR    Anesthesia Start: 1633 Anesthesia Stop: 1702    Procedure: Cystoscopy Left Stent Insertion (Ureter) Diagnosis:       Kidney stone      (Kidney stone [N20.0])    Surgeons: Fausto Short Jr., MD Responsible Provider: Hector Allen DO    Anesthesia Type: TIVA ASA Status: 3            Anesthesia Type: No value filed.    Bhupinder Phase I: Bhupinder Score: 10    Bhupinder Phase II: Bhupinder Score: 10    Anesthesia Post Evaluation    Comments: [facility]  POST-ANESTHESIA NOTE       Name:  Yeny Fine                                         Age:  73 y.o.  MRN:  [id number]      Last Vitals:  /62   Pulse 79   Temp 99.3 °F (37.4 °C) (Temporal)   Resp 16   Ht 1.6 m (5' 3\")   Wt 88.4 kg (194 lb 12.8 oz)   SpO2 96%   BMI 34.51 kg/m²   Patient Vitals in the past 4 hrs:  02/26/24 1747, BP:139/62, Temp:99.3 °F (37.4 °C), Temp src:Temporal, Pulse:79, Resp:16, SpO2:96 %  02/26/24 1708, BP:(!) 140/62, Temp:99.7 °F (37.6 °C), Temp src:Temporal, Pulse:74, Resp:16, SpO2:97 %    Level of Consciousness:  Awake    Respiratory:  Stable    Oxygen Saturation:  Stable    Cardiovascular:  Stable    Hydration:  Adequate    PONV:  Stable    Post-op Pain:  Adequate analgesia    Post-op Assessment:  No apparent anesthetic complications    Additional Follow-Up / Treatment / Comment:  None    Hector Allen DO  February 26, 2024   6:26 PM      No notable events documented.

## 2024-02-26 NOTE — H&P
[facility]  Urology H&P Note     Patient:  Yeny Fine  MRN: [id number]  YOB: 1950    ATTENDING: Fausto Short Jr, MD     CHIEF COMPLAINT:  Left kidney stone with UTI    HISTORY OF PRESENT ILLNESS:   The patient is a 73 y.o. female who presents with  Left kidney stone with UTI, developing systemic symptoms of shaking chills.    Patient's old records, notes and chart reviewed and summarized above.     Past Medical History:    Past Medical History:   Diagnosis Date    Arthritis     Asthma     seasonal    Depression     Fibromyalgia     GERD (gastroesophageal reflux disease)     Hx of blood clots 2003    DVT after arthroscopy right knee    Hyperlipidemia     Hypertension     Hypothyroidism     Restless legs syndrome     Type II or unspecified type diabetes mellitus without mention of complication, not stated as uncontrolled     Unspecified sleep apnea     no CPAP       Past Surgical History:    Past Surgical History:   Procedure Laterality Date    BREAST LUMPECTOMY Right 10/17/2013    radial scar removal    CARDIAC CATHETERIZATION  2006??    results ok    COLONOSCOPY      last one 2014??    COLONOSCOPY  08/11/2017    Dr Gaston    EYE SURGERY  feb. 2016    left cataract surgery    HYSTERECTOMY (CERVIX STATUS UNKNOWN)  1992    JOINT REPLACEMENT Right 2013    total knee replacement    KNEE ARTHROSCOPY Right 2003    OH BX OF BREAST,VACUUM ASST,IMAGE GUIDE Right 09/26/2013    Radial scar    SINUS SURGERY  1994    SKIN BIOPSY      TONSILLECTOMY  1955    UPPER GASTROINTESTINAL ENDOSCOPY  07/25/2017    Dr Gaston    VENA CAVA FILTER PLACEMENT  2003    [place] filter placed       Medications Prior to Admission:   Prior to Admission medications    Medication Sig Start Date End Date Taking? Authorizing Provider   ketorolac (TORADOL) 10 MG tablet Take 1 tablet by mouth every 6 hours as needed for Pain 2/24/24 2/28/24  Silver Thompson,    ondansetron (ZOFRAN-ODT) 4 MG disintegrating tablet Take 1

## 2024-02-26 NOTE — DISCHARGE INSTRUCTIONS
Discharge instructions: Cystoscopy Stent (for stone)  You May experience painful urination and see blood in the urine after your procedure.  Please stay hydrated.  This should resolve after stent is removed.   Stent may cause frequency, urgency, and even flank pain upon urination, please use medications to help control symptoms.      Pt ok to discharge home in good condition  No heavy lifting, >10 lbs for today  Pt should avoid strenuous activity for today  Pt should walk moderately at home  Pt ok to shower   Pt may resume diet as tolerated  Please call attending physician or hospital  with questions  Call or Present to ED if fever (> 101F), intractable nausea vomiting or pain.  Rx in chart     Pt should follow up with Dr. Fausto Short Jr, MD , in 1-4 weeks for definitive stone treatment, call to confirm appointment  - Please call office to confirm scheduling of ureteroscopy laser lithotripsy      Hospitalist Update for DC:   You were admitted on 2/26 to 2/28 due to UTI. IV ABX were started while we waited for urine culture final results. Your urine culture grew E coli and was sensitive to Zosyn which was the antibiotics you were on while in the hospital. You were transitioned to Augmentin for 14 days. You will need to follow up with Urology as an OP to have proper medical treatment for the ureteral stone.

## 2024-02-26 NOTE — ANESTHESIA PRE PROCEDURE
119/57   Pulse: 74   Resp: 16   Temp: 97.2 °F (36.2 °C)   TempSrc: Temporal   SpO2: 97%   Weight: 88.4 kg (194 lb 12.8 oz)   Height: 1.6 m (5' 3\")                                              BP Readings from Last 3 Encounters:   02/26/24 (!) 119/57   02/26/24 94/68   02/24/24 (!) 127/59       NPO Status: Time of last liquid consumption: 1000                        Time of last solid consumption: 1830                        Date of last liquid consumption: 02/26/24                        Date of last solid food consumption: 02/25/24    BMI:   Wt Readings from Last 3 Encounters:   02/26/24 88.4 kg (194 lb 12.8 oz)   02/26/24 88.9 kg (196 lb)   02/24/24 88.9 kg (196 lb)     Body mass index is 34.51 kg/m².    CBC:   Lab Results   Component Value Date/Time    WBC 11.2 02/24/2024 11:11 AM    RBC 4.89 02/24/2024 11:11 AM    HGB 14.4 02/24/2024 11:11 AM    HCT 43.5 02/24/2024 11:11 AM    MCV 89 02/24/2024 11:11 AM    RDW 14.7 02/24/2024 11:11 AM     02/24/2024 11:11 AM       CMP:   Lab Results   Component Value Date/Time     02/24/2024 11:11 AM     11/17/2023 10:28 AM    K 4.1 02/24/2024 11:11 AM    K 4.7 11/17/2023 10:28 AM    K 4.3 05/30/2021 07:22 PM     11/17/2023 10:28 AM    CO2 25 11/17/2023 10:28 AM    BUN 21 11/17/2023 10:28 AM    CREATININE 0.7 11/17/2023 10:28 AM    LABGLOM 47 02/24/2024 11:11 AM    GLUCOSE 123 11/17/2023 10:28 AM    GLUCOSE 161 02/15/2023 10:54 AM    PROT 6.9 11/17/2023 10:28 AM    CALCIUM 9.1 11/17/2023 10:28 AM    BILITOT 0.3 11/17/2023 10:28 AM    ALKPHOS 122 11/17/2023 10:28 AM    AST 24 11/17/2023 10:28 AM    ALT 36 11/17/2023 10:28 AM       POC Tests:   Recent Labs     02/26/24  1451   POCGLU 203*       Coags: No results found for: \"PROTIME\", \"INR\", \"APTT\"    HCG (If Applicable): No results found for: \"PREGTESTUR\", \"PREGSERUM\", \"HCG\", \"HCGQUANT\"     ABGs: No results found for: \"PHART\", \"PO2ART\", \"HIJ9NSG\", \"TCN9NLU\", \"BEART\", \"G9YFXHBR\"     Type & Screen (If

## 2024-02-26 NOTE — PROGRESS NOTES
[facility] [place] [facility]  [facility] UROLOGY  [address]  [address]  [facility] [address]  Dept: [phone]  Loc: [phone]  Visit Date: 2/26/2024    Patient:  Yeny Fine  YOB: 1950  Date: 2/26/2024    HPI  The patient is a 73 y.o. female who presents today as a new patient for ED kidney stone follow-up.    Yeny presented to the [facility] ED on 2/24/24 for evaluation of left flank pain, N/V where she was found to have a 6 mm proximal left ureteral calculus with hydronephrosis and accompanying PATRICIA. CRT at baseline is 0.7, 1.2 in the ED on 2/24/24. Pain, nausea, and weakness/chills worsening over the past two days. Patient feeling unwell. Non-bilious emesis. Afebrile. BP low at 94/68.    First kidney stone. Not previously following with URO.    On Plasugrel. IVC filter in place.    UA: Large Blood. Large Leukocytes. Turbid, milky white/yellow.  Lab Results   Component Value Date/Time    COLORU Other 02/26/2024 10:38 AM    COLORU Yellow 02/24/2024 10:55 AM    LABSPEC >= 1.030 02/26/2024 10:38 AM    LABPH 5.50 02/26/2024 10:38 AM    NITRU Negative 02/26/2024 10:38 AM    GLUCOSEU Negative 02/26/2024 10:38 AM    KETUA Negative 02/26/2024 10:38 AM    UROBILINOGEN 0.20 02/26/2024 10:38 AM    BILIRUBINUR Small 02/26/2024 10:38 AM      Pain Scale 7/10        Last BUN and creatinine:  Lab Results   Component Value Date    BUN 21 11/17/2023     Lab Results   Component Value Date    CREATININE 0.7 11/17/2023       Imaging Reviewed during this Office Visit:   (results were independently reviewed by physician and radiology report verified)  I independently reviewed and verified the images and reports from:    CT ABDOMEN PELVIS WO CONTRAST     Result Date: 2/24/2024  PROCEDURE: CT ABDOMEN PELVIS WO CONTRAST CLINICAL INFORMATION: left flank pain, hematuria . COMPARISON: CT scan of the abdomen and pelvis dated 3/13/2023.. TECHNIQUE: Axial 5 mm CT images were obtained through the abdomen

## 2024-02-27 ENCOUNTER — TELEPHONE (OUTPATIENT)
Dept: UROLOGY | Age: 74
End: 2024-02-27

## 2024-02-27 ENCOUNTER — APPOINTMENT (OUTPATIENT)
Age: 74
DRG: 659 | End: 2024-02-27
Attending: PHYSICIAN ASSISTANT
Payer: MEDICARE

## 2024-02-27 LAB
ANION GAP SERPL CALC-SCNC: 11 MEQ/L (ref 8–16)
BASOPHILS ABSOLUTE: 0 THOU/MM3 (ref 0–0.1)
BASOPHILS NFR BLD AUTO: 0.1 %
BUN SERPL-MCNC: 32 MG/DL (ref 7–22)
CALCIUM SERPL-MCNC: 8.3 MG/DL (ref 8.5–10.5)
CHLORIDE SERPL-SCNC: 106 MEQ/L (ref 98–111)
CO2 SERPL-SCNC: 23 MEQ/L (ref 23–33)
CREAT SERPL-MCNC: 1.4 MG/DL (ref 0.4–1.2)
DEPRECATED RDW RBC AUTO: 49.1 FL (ref 35–45)
ECHO AO ASC DIAM: 4.1 CM
ECHO AO ASCENDING AORTA INDEX: 2.12 CM/M2
ECHO AV CUSP MM: 1.7 CM
ECHO AV PEAK GRADIENT: 9 MMHG
ECHO AV PEAK VELOCITY: 1.5 M/S
ECHO AV VELOCITY RATIO: 0.67
ECHO BSA: 1.98 M2
ECHO IVC PROX: 2.3 CM
ECHO LA AREA 2C: 14.5 CM2
ECHO LA AREA 4C: 14.2 CM2
ECHO LA DIAMETER INDEX: 2.07 CM/M2
ECHO LA DIAMETER: 4 CM
ECHO LA MAJOR AXIS: 5.1 CM
ECHO LA MINOR AXIS: 4.7 CM
ECHO LA VOL BP: 35 ML (ref 22–52)
ECHO LA VOL MOD A2C: 37 ML (ref 22–52)
ECHO LA VOL MOD A4C: 32 ML (ref 22–52)
ECHO LA VOL/BSA BIPLANE: 18 ML/M2 (ref 16–34)
ECHO LA VOLUME INDEX MOD A2C: 19 ML/M2 (ref 16–34)
ECHO LA VOLUME INDEX MOD A4C: 17 ML/M2 (ref 16–34)
ECHO LV E' LATERAL VELOCITY: 12 CM/S
ECHO LV E' SEPTAL VELOCITY: 11 CM/S
ECHO LV FRACTIONAL SHORTENING: 29 % (ref 28–44)
ECHO LV INTERNAL DIMENSION DIASTOLE INDEX: 2.49 CM/M2
ECHO LV INTERNAL DIMENSION DIASTOLIC: 4.8 CM (ref 3.9–5.3)
ECHO LV INTERNAL DIMENSION SYSTOLIC INDEX: 1.76 CM/M2
ECHO LV INTERNAL DIMENSION SYSTOLIC: 3.4 CM
ECHO LV ISOVOLUMETRIC RELAXATION TIME (IVRT): 63 MS
ECHO LV IVSD: 1 CM (ref 0.6–0.9)
ECHO LV MASS 2D: 137.1 G (ref 67–162)
ECHO LV MASS INDEX 2D: 71 G/M2 (ref 43–95)
ECHO LV POSTERIOR WALL DIASTOLIC: 0.7 CM (ref 0.6–0.9)
ECHO LV RELATIVE WALL THICKNESS RATIO: 0.29
ECHO LVOT PEAK GRADIENT: 4 MMHG
ECHO LVOT PEAK VELOCITY: 1 M/S
ECHO MV A VELOCITY: 1.05 M/S
ECHO MV E DECELERATION TIME (DT): 211 MS
ECHO MV E VELOCITY: 0.67 M/S
ECHO MV E/A RATIO: 0.64
ECHO MV E/E' LATERAL: 5.58
ECHO MV E/E' RATIO (AVERAGED): 5.84
ECHO MV REGURGITANT PEAK GRADIENT: 74 MMHG
ECHO MV REGURGITANT PEAK VELOCITY: 4.3 M/S
ECHO PV MAX VELOCITY: 0.5 M/S
ECHO PV PEAK GRADIENT: 1 MMHG
ECHO RV INTERNAL DIMENSION: 2.6 CM
ECHO RV TAPSE: 2.2 CM (ref 1.7–?)
ECHO TV E WAVE: 0.6 M/S
ECHO TV REGURGITANT MAX VELOCITY: 3 M/S
ECHO TV REGURGITANT PEAK GRADIENT: 36 MMHG
ELLIPTOCYTES: ABNORMAL
EOSINOPHIL NFR BLD AUTO: 0 %
EOSINOPHILS ABSOLUTE: 0 THOU/MM3 (ref 0–0.4)
ERYTHROCYTE [DISTWIDTH] IN BLOOD BY AUTOMATED COUNT: 14.6 % (ref 11.5–14.5)
GFR SERPL CREATININE-BSD FRML MDRD: 39 ML/MIN/1.73M2
GLUCOSE BLD STRIP.AUTO-MCNC: 146 MG/DL (ref 70–108)
GLUCOSE BLD STRIP.AUTO-MCNC: 161 MG/DL (ref 70–108)
GLUCOSE BLD STRIP.AUTO-MCNC: 192 MG/DL (ref 70–108)
GLUCOSE BLD STRIP.AUTO-MCNC: 193 MG/DL (ref 70–108)
GLUCOSE SERPL-MCNC: 163 MG/DL (ref 70–108)
HCT VFR BLD AUTO: 36 % (ref 37–47)
HGB BLD-MCNC: 11.2 GM/DL (ref 12–16)
IMM GRANULOCYTES # BLD AUTO: 0.19 THOU/MM3 (ref 0–0.07)
IMM GRANULOCYTES NFR BLD AUTO: 1.5 %
LYMPHOCYTES ABSOLUTE: 0.4 THOU/MM3 (ref 1–4.8)
LYMPHOCYTES NFR BLD AUTO: 3.5 %
MCH RBC QN AUTO: 28.3 PG (ref 26–33)
MCHC RBC AUTO-ENTMCNC: 31.1 GM/DL (ref 32.2–35.5)
MCV RBC AUTO: 90.9 FL (ref 81–99)
MONOCYTES ABSOLUTE: 0.6 THOU/MM3 (ref 0.4–1.3)
MONOCYTES NFR BLD AUTO: 5 %
NEUTROPHILS NFR BLD AUTO: 89.9 %
NRBC BLD AUTO-RTO: 0 /100 WBC
PLATELET # BLD AUTO: 75 THOU/MM3 (ref 130–400)
PLATELET BLD QL SMEAR: ABNORMAL
PMV BLD AUTO: 11.6 FL (ref 9.4–12.4)
POIKILOCYTES: ABNORMAL
POLYCHROMASIA BLD QL SMEAR: ABNORMAL
POTASSIUM SERPL-SCNC: 4.3 MEQ/L (ref 3.5–5.2)
RBC # BLD AUTO: 3.96 MILL/MM3 (ref 4.2–5.4)
SCAN OF BLOOD SMEAR: NORMAL
SEGMENTED NEUTROPHILS ABSOLUTE COUNT: 11.3 THOU/MM3 (ref 1.8–7.7)
SODIUM SERPL-SCNC: 140 MEQ/L (ref 135–145)
WBC # BLD AUTO: 12.6 THOU/MM3 (ref 4.8–10.8)

## 2024-02-27 PROCEDURE — 93306 TTE W/DOPPLER COMPLETE: CPT

## 2024-02-27 PROCEDURE — 85025 COMPLETE CBC W/AUTO DIFF WBC: CPT

## 2024-02-27 PROCEDURE — 6370000000 HC RX 637 (ALT 250 FOR IP): Performed by: PHYSICIAN ASSISTANT

## 2024-02-27 PROCEDURE — 2580000003 HC RX 258: Performed by: UROLOGY

## 2024-02-27 PROCEDURE — 6360000002 HC RX W HCPCS: Performed by: PHYSICIAN ASSISTANT

## 2024-02-27 PROCEDURE — 36415 COLL VENOUS BLD VENIPUNCTURE: CPT

## 2024-02-27 PROCEDURE — 6370000000 HC RX 637 (ALT 250 FOR IP): Performed by: UROLOGY

## 2024-02-27 PROCEDURE — 1200000003 HC TELEMETRY R&B

## 2024-02-27 PROCEDURE — 80048 BASIC METABOLIC PNL TOTAL CA: CPT

## 2024-02-27 PROCEDURE — 99232 SBSQ HOSP IP/OBS MODERATE 35: CPT | Performed by: PHYSICIAN ASSISTANT

## 2024-02-27 PROCEDURE — 2580000003 HC RX 258: Performed by: PHYSICIAN ASSISTANT

## 2024-02-27 PROCEDURE — 82948 REAGENT STRIP/BLOOD GLUCOSE: CPT

## 2024-02-27 RX ORDER — TAMSULOSIN HYDROCHLORIDE 0.4 MG/1
0.4 CAPSULE ORAL DAILY
Status: DISCONTINUED | OUTPATIENT
Start: 2024-02-27 | End: 2024-02-28 | Stop reason: HOSPADM

## 2024-02-27 RX ADMIN — CITALOPRAM 10 MG: 20 TABLET, FILM COATED ORAL at 08:07

## 2024-02-27 RX ADMIN — PIPERACILLIN AND TAZOBACTAM 3375 MG: 3; .375 INJECTION, POWDER, LYOPHILIZED, FOR SOLUTION INTRAVENOUS at 12:05

## 2024-02-27 RX ADMIN — LEVOTHYROXINE SODIUM 25 MCG: 0.03 TABLET ORAL at 05:43

## 2024-02-27 RX ADMIN — PIPERACILLIN AND TAZOBACTAM 3375 MG: 3; .375 INJECTION, POWDER, LYOPHILIZED, FOR SOLUTION INTRAVENOUS at 02:30

## 2024-02-27 RX ADMIN — SODIUM CHLORIDE: 9 INJECTION, SOLUTION INTRAVENOUS at 09:41

## 2024-02-27 RX ADMIN — TAMSULOSIN HYDROCHLORIDE 0.4 MG: 0.4 CAPSULE ORAL at 12:00

## 2024-02-27 RX ADMIN — ACETAMINOPHEN 650 MG: 325 TABLET ORAL at 05:01

## 2024-02-27 RX ADMIN — PIPERACILLIN AND TAZOBACTAM 3375 MG: 3; .375 INJECTION, POWDER, LYOPHILIZED, FOR SOLUTION INTRAVENOUS at 19:13

## 2024-02-27 ASSESSMENT — PAIN SCALES - GENERAL
PAINLEVEL_OUTOF10: 5
PAINLEVEL_OUTOF10: 6

## 2024-02-27 NOTE — TELEPHONE ENCOUNTER
Stent via [facility] for stone  Needs scheduled for definite stone surgery in the upcoming wks after infection clears

## 2024-02-27 NOTE — H&P
Hospitalist History & Physical    Patient:  Yeny Fine    Unit/Bed:5K-09/009-A  YOB: 1950  MRN: [id number]   Acct: [id number]   PCP: Tracy Jon APRN - CNP  Code Status: Full Code    Date of Service: Pt seen/examined on 02/26/24 and admitted to Inpatient with expected LOS greater than two midnights due to medical therapy.     Chief Complaint: chills    Assessment/Plan:    Complicated urinary tract infection  S/p left sided stent placement today with purulent discharge. Patient known to have left ureteral stone.  Escalate antibiotics to Zosyn  Follow urine culture results. Check blood cultures.  Acute kidney injury  Creatinine 1.8, baseline ~0.7.  IV fluids. Avoid nephrotoxins. Repeat BMP tomorrow.  Ins and outs. Daily weights.  Acute respiratory failure with hypoxia  Chest x-ray with findings consistent with fluid overload.  Receiving IV fluids in the setting of infection, PATRICIA, and lactic acidosis  Check echocardiogram  Likely will need diuresis pending clinical course  Lactic acidosis  3.2. Secondary to infection. Trend lactate.  Hypertension  Hold home regimen.  Unspecified depression  Continue Celexa  Hypothryoidism  Continue synthroid    History of Present Illness:  Yeny Fine is a 73 y.o. female with a history of hypertension, depression, and hypothyroidism who presented to [facility] with chief complaint of chills.  The patient was seen in the emergency department on Friday and was found to have a left ureteral stone.  She presented today for a left ureteral stent placement which you underwent with urology.  During the procedure, the stent was placed and the patient was noted to have significant purulence in the left ureter.  Postoperatively the patient developed chills as well as nausea.  The patient is being admitted for a complicated urinary tract infection.  She also endorses a headache at this time which has been ongoing for a few days.  She denies any fevers, chest pain,

## 2024-02-27 NOTE — PLAN OF CARE
Problem: Discharge Planning  Goal: Discharge to home or other facility with appropriate resources  Outcome: Progressing  Flowsheets (Taken 2/27/2024 0021)  Discharge to home or other facility with appropriate resources: Identify barriers to discharge with patient and caregiver     Problem: Pain  Goal: Verbalizes/displays adequate comfort level or baseline comfort level  Outcome: Progressing  Flowsheets (Taken 2/27/2024 0021)  Verbalizes/displays adequate comfort level or baseline comfort level:   Encourage patient to monitor pain and request assistance   Administer analgesics based on type and severity of pain and evaluate response   Consider cultural and social influences on pain and pain management   Assess pain using appropriate pain scale   Implement non-pharmacological measures as appropriate and evaluate response     Problem: Safety - Adult  Goal: Free from fall injury  Outcome: Progressing  Flowsheets (Taken 2/27/2024 0021)  Free From Fall Injury: Instruct family/caregiver on patient safety  Note: Patient has remained free of physical injury during this shift. Safe environment provided, call light within reach, and hourly rounding completed.    Care plan reviewed with patient.  Patient verbalize understanding of the plan of care and contribute to goal setting.

## 2024-02-27 NOTE — PLAN OF CARE
Problem: Discharge Planning  Goal: Discharge to home or other facility with appropriate resources  2/27/2024 1723 by Ryley Mckoy RN  Outcome: Progressing     Problem: Pain  Goal: Verbalizes/displays adequate comfort level or baseline comfort level  2/27/2024 1723 by Ryley Mckoy RN  Outcome: Progressing     Problem: Safety - Adult  Goal: Free from fall injury  2/27/2024 1723 by Ryley Mckoy RN  Outcome: Progressing     Problem: Chronic Conditions and Co-morbidities  Goal: Patient's chronic conditions and co-morbidity symptoms are monitored and maintained or improved  2/27/2024 1723 by Ryley Mckoy RN  Outcome: Progressing     Problem: Skin/Tissue Integrity - Adult  Goal: Skin integrity remains intact  Outcome: Progressing     Problem: Musculoskeletal - Adult  Goal: Return mobility to safest level of function  Outcome: Progressing     Problem: Genitourinary - Adult  Goal: Urinary catheter remains patent  Outcome: Progressing     Problem: Infection - Adult  Goal: Absence of infection at discharge  Outcome: Progressing       Care plan reviewed with patient and spouse, patient and spouse verbalized understanding of plan of care and contributed to goal setting.

## 2024-02-28 VITALS
WEIGHT: 197.97 LBS | TEMPERATURE: 98.3 F | HEART RATE: 57 BPM | DIASTOLIC BLOOD PRESSURE: 73 MMHG | OXYGEN SATURATION: 96 % | BODY MASS INDEX: 35.08 KG/M2 | RESPIRATION RATE: 18 BRPM | SYSTOLIC BLOOD PRESSURE: 160 MMHG | HEIGHT: 63 IN

## 2024-02-28 LAB
ANION GAP SERPL CALC-SCNC: 12 MEQ/L (ref 8–16)
BACTERIA UR CULT: ABNORMAL
BUN SERPL-MCNC: 25 MG/DL (ref 7–22)
CALCIUM SERPL-MCNC: 8.3 MG/DL (ref 8.5–10.5)
CHLORIDE SERPL-SCNC: 103 MEQ/L (ref 98–111)
CO2 SERPL-SCNC: 20 MEQ/L (ref 23–33)
CREAT SERPL-MCNC: 1.1 MG/DL (ref 0.4–1.2)
EKG ATRIAL RATE: 78 BPM
EKG P AXIS: 45 DEGREES
EKG P-R INTERVAL: 144 MS
EKG Q-T INTERVAL: 388 MS
EKG QRS DURATION: 100 MS
EKG QTC CALCULATION (BAZETT): 442 MS
EKG R AXIS: -11 DEGREES
EKG T AXIS: 29 DEGREES
EKG VENTRICULAR RATE: 78 BPM
GFR SERPL CREATININE-BSD FRML MDRD: 52 ML/MIN/1.73M2
GLUCOSE BLD STRIP.AUTO-MCNC: 139 MG/DL (ref 70–108)
GLUCOSE BLD STRIP.AUTO-MCNC: 223 MG/DL (ref 70–108)
GLUCOSE SERPL-MCNC: 249 MG/DL (ref 70–108)
ORGANISM: ABNORMAL
POTASSIUM SERPL-SCNC: 4.1 MEQ/L (ref 3.5–5.2)
SODIUM SERPL-SCNC: 135 MEQ/L (ref 135–145)

## 2024-02-28 PROCEDURE — 6370000000 HC RX 637 (ALT 250 FOR IP): Performed by: UROLOGY

## 2024-02-28 PROCEDURE — 2580000003 HC RX 258: Performed by: PHYSICIAN ASSISTANT

## 2024-02-28 PROCEDURE — 6370000000 HC RX 637 (ALT 250 FOR IP): Performed by: PHYSICIAN ASSISTANT

## 2024-02-28 PROCEDURE — 6360000002 HC RX W HCPCS: Performed by: PHYSICIAN ASSISTANT

## 2024-02-28 PROCEDURE — 82948 REAGENT STRIP/BLOOD GLUCOSE: CPT

## 2024-02-28 PROCEDURE — 36415 COLL VENOUS BLD VENIPUNCTURE: CPT

## 2024-02-28 PROCEDURE — 99239 HOSP IP/OBS DSCHRG MGMT >30: CPT | Performed by: PHYSICIAN ASSISTANT

## 2024-02-28 PROCEDURE — 80048 BASIC METABOLIC PNL TOTAL CA: CPT

## 2024-02-28 PROCEDURE — 2580000003 HC RX 258: Performed by: UROLOGY

## 2024-02-28 RX ORDER — AMOXICILLIN AND CLAVULANATE POTASSIUM 875; 125 MG/1; MG/1
1 TABLET, FILM COATED ORAL 2 TIMES DAILY
Qty: 24 TABLET | Refills: 0 | Status: SHIPPED | OUTPATIENT
Start: 2024-02-28 | End: 2024-03-11

## 2024-02-28 RX ADMIN — INSULIN LISPRO 1 UNITS: 100 INJECTION, SOLUTION INTRAVENOUS; SUBCUTANEOUS at 12:11

## 2024-02-28 RX ADMIN — PIPERACILLIN AND TAZOBACTAM 3375 MG: 3; .375 INJECTION, POWDER, LYOPHILIZED, FOR SOLUTION INTRAVENOUS at 02:41

## 2024-02-28 RX ADMIN — CITALOPRAM 10 MG: 20 TABLET, FILM COATED ORAL at 08:14

## 2024-02-28 RX ADMIN — SODIUM CHLORIDE: 9 INJECTION, SOLUTION INTRAVENOUS at 02:37

## 2024-02-28 RX ADMIN — LEVOTHYROXINE SODIUM 25 MCG: 0.03 TABLET ORAL at 05:42

## 2024-02-28 RX ADMIN — TAMSULOSIN HYDROCHLORIDE 0.4 MG: 0.4 CAPSULE ORAL at 08:14

## 2024-02-28 RX ADMIN — PIPERACILLIN AND TAZOBACTAM 3375 MG: 3; .375 INJECTION, POWDER, LYOPHILIZED, FOR SOLUTION INTRAVENOUS at 11:00

## 2024-02-28 RX ADMIN — ACETAMINOPHEN 650 MG: 325 TABLET ORAL at 02:35

## 2024-02-28 ASSESSMENT — PAIN DESCRIPTION - FREQUENCY
FREQUENCY: INTERMITTENT

## 2024-02-28 ASSESSMENT — PAIN DESCRIPTION - ONSET
ONSET: ON-GOING

## 2024-02-28 ASSESSMENT — PAIN SCALES - GENERAL
PAINLEVEL_OUTOF10: 2
PAINLEVEL_OUTOF10: 3
PAINLEVEL_OUTOF10: 2
PAINLEVEL_OUTOF10: 2

## 2024-02-28 ASSESSMENT — PAIN DESCRIPTION - LOCATION
LOCATION: BACK

## 2024-02-28 ASSESSMENT — PAIN DESCRIPTION - ORIENTATION
ORIENTATION: MID;LOWER
ORIENTATION: MID
ORIENTATION: LOWER

## 2024-02-28 ASSESSMENT — PAIN - FUNCTIONAL ASSESSMENT
PAIN_FUNCTIONAL_ASSESSMENT: PREVENTS OR INTERFERES SOME ACTIVE ACTIVITIES AND ADLS

## 2024-02-28 ASSESSMENT — PAIN DESCRIPTION - PAIN TYPE
TYPE: ACUTE PAIN

## 2024-02-28 ASSESSMENT — PAIN DESCRIPTION - DESCRIPTORS
DESCRIPTORS: ACHING

## 2024-02-28 NOTE — PLAN OF CARE
Problem: Discharge Planning  Goal: Discharge to home or other facility with appropriate resources  Outcome: Adequate for Discharge  Flowsheets (Taken 2/28/2024 0846 by Cee Garner)  Discharge to home or other facility with appropriate resources: Identify barriers to discharge with patient and caregiver     Problem: Pain  Goal: Verbalizes/displays adequate comfort level or baseline comfort level  Outcome: Adequate for Discharge     Problem: Safety - Adult  Goal: Free from fall injury  Outcome: Adequate for Discharge     Problem: Chronic Conditions and Co-morbidities  Goal: Patient's chronic conditions and co-morbidity symptoms are monitored and maintained or improved  Outcome: Adequate for Discharge  Flowsheets (Taken 2/28/2024 0846 by Cee Garner)  Care Plan - Patient's Chronic Conditions and Co-Morbidity Symptoms are Monitored and Maintained or Improved: Monitor and assess patient's chronic conditions and comorbid symptoms for stability, deterioration, or improvement     Problem: Skin/Tissue Integrity - Adult  Goal: Skin integrity remains intact  Outcome: Adequate for Discharge  Flowsheets (Taken 2/28/2024 0846 by Cee Garner)  Skin Integrity Remains Intact: Monitor for areas of redness and/or skin breakdown     Problem: Musculoskeletal - Adult  Goal: Return mobility to safest level of function  Outcome: Adequate for Discharge  Flowsheets (Taken 2/28/2024 0846 by Cee Garner)  Return Mobility to Safest Level of Function: Assess patient stability and activity tolerance for standing, transferring and ambulating with or without assistive devices     Problem: Genitourinary - Adult  Goal: Urinary catheter remains patent  Outcome: Adequate for Discharge  Flowsheets (Taken 2/28/2024 0846 by Cee Garner)  Urinary catheter remains patent: Assess patency of urinary catheter     Problem: Infection - Adult  Goal: Absence of infection at discharge  Outcome: Adequate for Discharge  Flowsheets (Taken 2/28/2024 0846 by

## 2024-02-28 NOTE — PLAN OF CARE
Problem: Discharge Planning  Goal: Discharge to home or other facility with appropriate resources  2/27/2024 2141 by Annalisa Yo, RN  Outcome: Progressing  Flowsheets (Taken 2/27/2024 2141)  Discharge to home or other facility with appropriate resources:   Identify barriers to discharge with patient and caregiver   Arrange for needed discharge resources and transportation as appropriate   Identify discharge learning needs (meds, wound care, etc)   Refer to discharge planning if patient needs post-hospital services based on physician order or complex needs related to functional status, cognitive ability or social support system     Problem: Pain  Goal: Verbalizes/displays adequate comfort level or baseline comfort level  2/27/2024 2141 by Annalisa Yo, RN  Outcome: Progressing  Flowsheets (Taken 2/27/2024 2141)  Verbalizes/displays adequate comfort level or baseline comfort level:   Encourage patient to monitor pain and request assistance   Administer analgesics based on type and severity of pain and evaluate response   Implement non-pharmacological measures as appropriate and evaluate response   Assess pain using appropriate pain scale     Problem: Safety - Adult  Goal: Free from fall injury  2/27/2024 2141 by Annalisa Yo, RN  Outcome: Progressing  Flowsheets (Taken 2/27/2024 2141)  Free From Fall Injury:   Instruct family/caregiver on patient safety   Based on caregiver fall risk screen, instruct family/caregiver to ask for assistance with transferring infant if caregiver noted to have fall risk factors     Problem: Chronic Conditions and Co-morbidities  Goal: Patient's chronic conditions and co-morbidity symptoms are monitored and maintained or improved  2/27/2024 2141 by Annalisa Yo, RN  Outcome: Progressing  Flowsheets (Taken 2/27/2024 2141)  Care Plan - Patient's Chronic Conditions and Co-Morbidity Symptoms are Monitored and Maintained or Improved:   Monitor and assess patient's chronic

## 2024-02-28 NOTE — PROGRESS NOTES
Physician Progress Note      PATIENT:               SONYA GOMEZ  [facility] #:                  [id number]  :                       1950  ADMIT DATE:       2024 1:32 PM  DISCH DATE:  RESPONDING  PROVIDER #:        Aishwarya PELAYO          QUERY TEXT:    Pt admitted with left kidney stone with UTI.   Urology note states,   \"developing systemic symptoms of shaking chills.\"  Pt noted to have T 100.8,   WBC () 11.2...() 12.6, acute respiratory failure, PATRICIA, lactic acidosis   (3.2).  If possible, please document in the progress notes and discharge   summary if you are evaluating and /or treating any of the following:    The medical record reflects the following:  Risk Factors: UTI, left kidney stone, DM 2, advanced age  Clinical Indicators:  Urology note states, \"developing systemic symptoms   of shaking chills.\"  Pt noted to have T 100.8, WBC () 11.2...() 12.6,   acute respiratory failure, PATRICIA, lactic acidosis (3.2).  Treatment: IV ATBs, IVF, Tylenol, O2, labs, imaging, Urology consult, stone   intervention delayed due to infection    Thank you!    Lory Milner, RN, BSN, RHIT, CCDS  Clinical   Options provided:  -- Sepsis, present on admission  -- Sepsis, developed following admission  -- UTI without Sepsis  -- Other - I will add my own diagnosis  -- Disagree - Not applicable / Not valid  -- Disagree - Clinically unable to determine / Unknown  -- Refer to Clinical Documentation Reviewer    PROVIDER RESPONSE TEXT:    This patient has sepsis that developed following admission.    Query created by: Lory Milner on 2024 2:03 PM      Electronically signed by:  Aishwarya PELAYO 2024 3:55 PM          
1840: report called to Marisol on . Pt placed for transport.   1857: transport arrives, pt transported to 5.   
Admitted to Same Day Surgery and oriented to the unit. Patient verbalized approval for first name, last initial and physician name on the unit white board. Fall and allergy band on patient.   Sister Ashia at bedside.   
Patient discharged home with spouse. Patient verbalizes understanding of discharge instructions. Chart tore down and placed in yellow bin.   
Pt admitted to  [facility] via direct admit.     Complaints: Kidney stone, UTI.  S/p Left sided stent placement    Vital signs obtained. Assessment and data collection initiated.     Two nurse skin assessment performed by Elaine SNELL and Eunice RN. Oriented to room.     Explained patients right to have family, representative or physician notified of their admission.        Policies and procedures for  explained. All questions answered with no further questions at this time. Fall prevention and safety brochure discussed with patient.  Bed alarm on. Call light in reach.       
Pt returned to [facility] room 20. Vitals and assessment as charted. 0.9 infusing, @300ml to count from PACU. Pt has crackers and water. Family at the bedside. Pt and family verbalized understanding of discharge criteria and call light use. Call light in reach.     
Received report from Primary Nurse.    Theresa Delacruz, [employer] student  
Reported off to Primary Nurse.    Theresa Delacruz, [employer] student  
night. [facility] Pt denies pain. Pt is on RA.       Past medical history, family history, social history and allergies reviewed again and is unchanged since admission.    ROS (All review of systems completed.  Pertinent positives noted. Otherwise All other systems reviewed and negative.)     Medications:  Reviewed    Infusion Medications    sodium chloride 100 mL/hr at 02/27/24 0941    sodium chloride      dextrose       Scheduled Medications    tamsulosin  0.4 mg Oral Daily    sodium chloride flush  5-40 mL IntraVENous 2 times per day    insulin lispro  0-4 Units SubCUTAneous TID WC    insulin lispro  0-4 Units SubCUTAneous Nightly    citalopram  10 mg Oral Daily    levothyroxine  25 mcg Oral Daily    [Held by provider] losartan  100 mg Oral Daily    [Held by provider] metoprolol tartrate  12.5 mg Oral BID    [Held by provider] prasugrel  5 mg Oral Daily    piperacillin-tazobactam  3,375 mg IntraVENous Q8H     PRN Meds: sodium chloride flush, sodium chloride, ondansetron **OR** ondansetron, polyethylene glycol, acetaminophen **OR** acetaminophen, glucose, dextrose bolus **OR** dextrose bolus, glucagon (rDNA), dextrose      Intake/Output Summary (Last 24 hours) at 2/27/2024 1103  Last data filed at 2/27/2024 0501  Gross per 24 hour   Intake 1000 ml   Output 600 ml   Net 400 ml       Diet:  ADULT DIET; Regular    Physical Exam:  /73   Pulse 66   Temp 98.5 °F (36.9 °C) (Oral)   Resp 18   Ht 1.6 m (5' 3\")   Wt 89.8 kg (197 lb 15.6 oz)   SpO2 93%   BMI 35.07 kg/m²   General appearance: No apparent distress, appears stated age and cooperative.  HEENT: Pupils equal, round, and reactive to light. Conjunctivae/corneas clear.  Neck: Supple, with full range of motion. No jugular venous distention. Trachea midline.  Respiratory:  Normal respiratory effort. Clear to auscultation, bilaterally without Rales/Wheezes/Rhonchi.  Cardiovascular: Regular rate and rhythm with normal S1/S2 without murmurs, 
(gastroesophageal reflux disease)    Hypertension    Asthma    Hyperlipidemia    Personal history of DVT (deep vein thrombosis)    Fibromyalgia    Obesity (BMI 30-39.9)    Radial scar of breast    Obstructive sleep apnea of adult    Urinary tract infection       s/p Cystoscopy Left Stent Insertion on 2/26/2024    ANA Mack CNP  8:27 AM 2/27/2024

## 2024-02-28 NOTE — CARE COORDINATION
2/28/24, 9:37 AM EST    Patient goals/plan/ treatment preferences discussed by  and .  Patient goals/plan/ treatment preferences reviewed with patient/ family.  Patient/ family verbalize understanding of discharge plan and are in agreement with goal/plan/treatment preferences.  Understanding was demonstrated using the teach back method.  AVS provided by RN at time of discharge, which includes all necessary medical information pertaining to the patients current course of illness, treatment, post-discharge goals of care, and treatment preferences.     Services At/After Discharge: None       IMM Letter  IMM Letter given to Patient/Family/Significant other/Guardian/POA/by:: Mercedes Champagne RN Case Mgr.  IMM Letter date given:: 02/28/24  IMM Letter time given:: 1230     Pt verbalized understanding and gave permission for possible discharge within 4 hours of receiving IMM..    
Zofran, blood cultures pendiing, NPO, echocardiogram, home C-pap, SCD's, telemetry, up with assistance.     Procedure: 2/26 cystoscopy left stent insertion.     The Plan for Transition of Care is related to the following treatment goals of Kidney stone [N20.0]  Urinary tract infection [N39.0]    Patient Goals/Plan/Treatment Preferences: Yeny resides at home with her S/O. She is able to afford her medications and has the DME needed. Yeny declines home health. Her sister will drive her home.   Transportation/Food Security/Housekeeping Addressed: No issues identified.     Lisbeth Champagne RN  Case Management Department

## 2024-02-29 ENCOUNTER — TELEPHONE (OUTPATIENT)
Dept: UROLOGY | Age: 74
End: 2024-02-29

## 2024-02-29 NOTE — TELEPHONE ENCOUNTER
CARDIAC  CLEARANCE FORM    IF THE PATIENT NEEDS A CLEARANCE APPOINTMENT WITH YOUR OFFICE PLEASE CONTACT THE PATIENT    Clearance From  Dr Rob     Appointment Date    Time       Yeny Fine  1950  Surgeon:  Dr Fernandes    Procedure:  Cystoscopy, left ureteroscopy, laser lithotripsy, basket retrieval of stone fragments, and  left ureteral stent exchange  Date:  3/15/24  Facility: [facility].  MEDICAL HISTORY  DM CAD PVD CVA DVT/PE MI CHFMalignant Hyperthemia HTN Tobacco/ETOH Sleep Apnea GERD Hyperlipidemia Renal Insufficiency COPD/Asthma Bleeding Disorder Pacemaker/AICD  II. CURRENT MEDICATIONS: Attach list or complete     Pt is on following meds that need special instructions for surgery:  Anticoagulants Heart Meds ASA Insulin Oral anti-diabetics NSAIDS Diuretics     K replacements  III.  ALLERGIES:   IV.  FUNCTIONAL CAPACITY  >4 METS (CAN VACUUM/HOUSEWORK,CLIMB FLIGHT STAIRS WITHOUT DYSPNEA)  <4METS (FLIGHT OF STEPS CAUSES DYSPNEA/CARDIAC SYMPTOMS)   Stress Test Recommended:    Stress tests or Cardiac Cath in last 5 years:  Yes (attach report)  No   Results: WNL   ABN  Any Change in Cardiac symptoms: Yes  NO  Comments:    Revascularization in last 5 years: Yes  NO  CABG: Yes  No   Comments:     Stents:  Date: ________  Any change in cardiac symptoms  Yes  NO  Comments:   V.  REVIEW OF SYSTEMS:  (Pertinent positive or negative)    VI. PHYSICIAL EXAM  HEENT:1.  Dentations   Good Poor          HT:_______ WT:______      2.  Neck Pathology: Rheumatoid DDD C-spine  BP:______ P:________  PULMONARY:  CARDIAC  ABDOMEN  EXTREMITIES  OTHER  VII.  Testing Ordered by Surgeon  Reviewed by Clearance Physician  Test      Result  Plan,if

## 2024-02-29 NOTE — TELEPHONE ENCOUNTER
Patient is scheduled for surgery with Dr Short on 3/15/24. Surgery consent to be done on arrival. Dr. Rob to clear. Patient does not need any pre op testing done. Surgery instructions gone over with patient verbally in the office or mailed to the patient.     Patient informed an adult over the age of 18 must be with them at the time of surgery, upon discharge and at home for 24 hours after the procedure.

## 2024-02-29 NOTE — OP NOTE
FACILITY: [place], [place]   Yeny Fine  1950  [id number]    DATE:  2/26/24  SURGEON:  Dr. Fausto Short Jr, MD , M.D.  ASSISTANT:  Dr. Fausto Short Jr, MD M.D.  PREOPERATIVE DIAGNOSIS: left Kidney stone with UTI  POSTOPERATIVE DIAGNOSIS: Same  PROCEDURES PERFORMED:  1. Cystoscopy.  2. Left sided stent placement.  DRAINS: A Left sided 6x26 cm double-J ureteral stent  SPECIMEN: post stent  ANESTHESIA: General  ESTIMATED BLOOD LOSS:  None.   COMPLICATIONS:  None.     INDICATIONS FOR PROCEDURE:  Yeny Fine is a 73 y.o. female presents for left kidney stone.  After the risks, benefits, alternatives, of the procedure were discussed with the patient, informed consent was obtained.  The patient elected to proceed.     DETAILS OF THE PROCEDURE:  The patient was brought back to the preoperative holding area to the operating suite, and was transferred to the operating table where the patient lay in supine position. General endotracheal anesthesia was induced, and patient was prepped and draped in standard surgical fashion after being placed in dorsolithotomy position. A proper timeout was performed, preoperative antibiotics were given. A rigid cystoscope with a 22 [place] sheath with 30 degree lens was inserted in the patient's urethral and into the bladder with ease. We then focused our attention on the left ureteral orifice, which we cannulated with our glidewire. Over our glidewire we placed a 6x26 cm double-J ureteral stent and we noted appropriate placement in the upper collecting system using fluoroscopy.  We then removed our wire. There was good proximal and distal curl. We did not leave the string at the end of the stent. We then drained the patient's bladder and removed the scope and the procedure was subsequently terminated.      Plan:   The patient will be discharged home in good condition.  The patient will need to follow up for definitive stone management.

## 2024-02-29 NOTE — TELEPHONE ENCOUNTER
DO NOT TAKE  FISH OIL, MOBIC, IBUPROFEN, MOTRIN-LIKE DRUGS AND ANY MULTIVITAMINS OR OVER THE COUNTER SUPPLEMENTS 14 DAYS PRIOR TO SURGERY.    HOLD ASPIRIN 5 DAYS PRIOR TO SURGERY    HOLD THE EFFIENT 3 DAYS PRIOR    IF YOU TAKE GLUCOPHAGE, METFORMIN OR JANUMET, HOLD 2 DAYS PRIOR TO SURGERY    MUST HAVE AN ADULT OVER THE AGE OF 18 WITH YOU AT THE TIME OF THE DISCHARGE AND WITH YOU AT HOME AFTER THE PROCEDURE FOR 24 HOURS        Yeny Fine 1950     Surgical Physician: Dr. Short      You have been scheduled for the procedure marked below:      Surgery: Cystoscopy, left ureteroscopy, laser lithotripsy, basket retrieval of stone fragments, and  left ureteral stent exchange         Date: 3/15/24     Anesthesia:  General     Place of Service: [facility] Second Floor Same Day Surgery         Arrive to same day surgery at:  12:30 pm  (Surgery time is subject to change)      INSTRUCTIONS AS MARKED BELOW:    1.  DO NOT eat or drink anything after midnight before surgery.  2.  We prefer you shower or bathe with an antibacterial soap (Dial) the morning of surgery.  3  Please bring a current medication list, photo ID and insurance card(s) with you  4. Okay to take Tylenol  5. Take blood pressure or heart medication as directed, if taken in the morning take with a small sip of water  6.The office will call you in 1-2 days after your procedure to schedule a follow up.          Date: 2/29/2024

## 2024-02-29 NOTE — TELEPHONE ENCOUNTER
SURGERY SCHEDULING FORM   [address] [address]      Phone *[phone] *[phone]   Surgical Scheduling Direct Line Phone *[phone] Fax *[phone]      Yeny Fine 1950 female    [address]  [place] [address]  Marital Status:          Home Phone: [phone]      Cell Phone:    Telephone Information:   Mobile [phone]          Surgeon: Dr. Short       Surgery Date: 3/15/24       Time: 2:30 pm    Procedure: Cystoscopy, left ureteroscopy, laser lithotripsy, basket retrieval of stone fragments, and  left ureteral stent eschange    Diagnosis: Kidney Stone     Important Medical History:  In Epic    Special Inst/Equip:     CPT Codes:    15545,21415  Latex Allergy: No     Cardiac Device:  No    Anesthesia:  General          Admission Type:  Same Day                        Admit Prior to Day of Surgery: No    Case Location:  Main OR            Preadmission Testing:  Phone Call          PAT Date and Time:______________________________________________________    PAT Confirmation #: ______________________________________________________    Post Op Visit: ___________________________________________________________    Need Preop Cardiac Clearance: Yes    Does Patient have Cardiologist/physician?     Dr Rob    Surgery Confirmation #: __________________________________________________    : ________________________   Date: __________________________     Insurance Company Name: [employer]

## 2024-03-01 NOTE — DISCHARGE SUMMARY
Hospitalist Discharge Note      Patient:  Yeny Fine    Unit/Bed:5K-09/009-A  YOB: 1950  MRN: [id number]   Acct: [id number]     PCP: Tracy Jon APRN - CNP  Date of Admission: 2/26/2024      Discharge date: 2/28/2024  2:29 PM    Chief Complaint on presentation :-  Chills     Discharge Assessment and Plan:-   Complicated urinary tract infection  S/p left sided stent placement on 2/26 with purulent discharge. Patient known to have left ureteral stone. Pt will follow up with Urology as an OP.   Zosyn transitioned to Augmentin to complete ABX course.   PT did well with voiding trial prior to DC.   Acute kidney injury  Creatinine 1.1 today, trending better from 1.8. Baseline ~0.7.   Acute respiratory failure with hypoxia  Chest x-ray with findings consistent with fluid overload.  Resolved. Pt is on RA.   Echocardiogram showed EF 50-55%. Normal diastolic function.   Lactic acidosis  Hypertension  Unspecified depression  Continue Celexa  Hypothryoidism  Continue synthroid    Initial H and P and Hospital course:-  Initial H&P \"Yeny Fine is a 73 y.o. female with a history of hypertension, depression, and hypothyroidism who presented to [facility] with chief complaint of chills.  The patient was seen in the emergency department on Friday and was found to have a left ureteral stone.  She presented today for a left ureteral stent placement which you underwent with urology.  During the procedure, the stent was placed and the patient was noted to have significant purulence in the left ureter.  Postoperatively the patient developed chills as well as nausea.  The patient is being admitted for a complicated urinary tract infection.  She also endorses a headache at this time which has been ongoing for a few days.  She denies any fevers, chest pain, cough, shortness of breath, significant abdominal pain, diarrhea, or constipation.  She has had some nausea and vomiting.\"      This is a

## 2024-03-07 ENCOUNTER — ANESTHESIA EVENT (OUTPATIENT)
Dept: OPERATING ROOM | Age: 74
End: 2024-03-07
Payer: MEDICARE

## 2024-03-07 NOTE — PROGRESS NOTES
Dr Macdonald is ok with proceeding as long as pt doesn't have increase of SOB. CXR 2/26/24 and abn labs

## 2024-03-07 NOTE — PROGRESS NOTES
Pt states she is feeling very weak and just not right.  Encourage pt if still feeling unwell tomorrow to call family MD or if feeling worse to go to ER or urgent care. Pt states she is vomiting as well.

## 2024-03-07 NOTE — PROGRESS NOTES
Pt doesn't know what weight is but states 197 lb isn't correct because she has lost weight recently

## 2024-03-07 NOTE — FLOWSHEET NOTE
Follow all instructions given by your physician  Do not eat or drink anything after midnight prior to surgery(includes water, chewing gum, mints and ice chips)  Sips of water am of surgery with allowed medications  Do not use chewing tobacco after midnight prior to surgery  May brush teeth do not swallow water  Do not smoke, drink alcoholic beverages or use any illicit drugs for 24 hours prior to surgery  Bring insurance info and photo ID  Bring pertinent paperwork with you from Doctor or surgeons's office  Wear clean comfortable, loose-fitting clothing  No make-up, nail polish, jewelry, piercings, or contact lenses to be worn day of surgery  No glue on dentures morning of surgery; you will be asked to remove them for surgery. Case for glasses.  Shower the night before and the morning of surgery with cleansing soap provided or a liquid antibacterial soap, dry with new fresh clean towel after each shower, no lotions, creams or powder.  Clean sheets and pillowcase on bed night before surgery  Bring medications in original bottles, Bring rescue inhalers with you  Bring CPAP/BIPAP machine if you have one ( you may be charged if one is needed in recovery room ) no doesn't use pacemaker no     Our pharmacy has a Meds to Beds program where they will deliver any new prescriptions you may have to your room before you leave. Our Pharmacy will clear it through your insurance; for example (same co pay). This enables you to take your new RX as soon as you need when you get home and avoids stop/wait delays on the way home.  Please have a form of payment with you and have someone designated as your Pharmacy contact with their phone # as you may not feel well or still be under the influence of anesthesia.    Please refer to the SSI-Surgical Site Infection Flyer you hopefully received in the mail-together we can prevent infections; signs and symptoms reviewed.  When discharged be sure you understand how to care for your wound and  that you have the Dr./office phone # to call if you have any concerns or questions about your wound.     needed at discharge and someone over 18 to stay with you for 24 hours overnight (surgery may be cancelled if you don't have this) sister   Report to [facility] on 2nd floor  If you would become ill prior to surgery, please call the surgeon  May have a visitor with you, we request that you limit to 2 visitors in pre-op area  Masks are recommended but not required, new masks at entrance desk  Call [phone] for any questions

## 2024-03-07 NOTE — PROGRESS NOTES
Mal crane with Dr Short office she has Abnormal labs Platelets 75 WBC 12.6 RBC 3.96 Hbg 11.2 Hct 36.0 From labs done on 2/27/24. CXR showed mild pulm. venous congestion and Mild interstitial edema (2/26/24).    Awaiting reply

## 2024-03-08 ENCOUNTER — PREP FOR PROCEDURE (OUTPATIENT)
Dept: UROLOGY | Age: 74
End: 2024-03-08

## 2024-03-09 RX ORDER — SODIUM CHLORIDE 0.9 % (FLUSH) 0.9 %
5-40 SYRINGE (ML) INJECTION PRN
Status: CANCELLED | OUTPATIENT
Start: 2024-03-09

## 2024-03-09 RX ORDER — SODIUM CHLORIDE 0.9 % (FLUSH) 0.9 %
5-40 SYRINGE (ML) INJECTION EVERY 12 HOURS SCHEDULED
Status: CANCELLED | OUTPATIENT
Start: 2024-03-09

## 2024-03-09 RX ORDER — SODIUM CHLORIDE 9 MG/ML
INJECTION, SOLUTION INTRAVENOUS PRN
Status: CANCELLED | OUTPATIENT
Start: 2024-03-09

## 2024-03-15 ENCOUNTER — APPOINTMENT (OUTPATIENT)
Dept: GENERAL RADIOLOGY | Age: 74
End: 2024-03-15
Attending: UROLOGY
Payer: MEDICARE

## 2024-03-15 ENCOUNTER — ANESTHESIA (OUTPATIENT)
Dept: OPERATING ROOM | Age: 74
End: 2024-03-15
Payer: MEDICARE

## 2024-03-15 ENCOUNTER — HOSPITAL ENCOUNTER (OUTPATIENT)
Age: 74
Setting detail: OUTPATIENT SURGERY
Discharge: HOME | End: 2024-03-15
Attending: UROLOGY | Admitting: UROLOGY
Payer: MEDICARE

## 2024-03-15 VITALS
SYSTOLIC BLOOD PRESSURE: 157 MMHG | OXYGEN SATURATION: 98 % | HEART RATE: 74 BPM | RESPIRATION RATE: 18 BRPM | HEIGHT: 63 IN | TEMPERATURE: 97 F | BODY MASS INDEX: 33 KG/M2 | DIASTOLIC BLOOD PRESSURE: 76 MMHG | WEIGHT: 186.25 LBS

## 2024-03-15 LAB
GLUCOSE BLD STRIP.AUTO-MCNC: 113 MG/DL (ref 70–108)
GLUCOSE BLD STRIP.AUTO-MCNC: 130 MG/DL (ref 70–108)
INR PPP: 1.03 (ref 0.85–1.13)

## 2024-03-15 PROCEDURE — 3600000013 HC SURGERY LEVEL 3 ADDTL 15MIN: Performed by: UROLOGY

## 2024-03-15 PROCEDURE — 85610 PROTHROMBIN TIME: CPT

## 2024-03-15 PROCEDURE — 2709999900 HC NON-CHARGEABLE SUPPLY: Performed by: UROLOGY

## 2024-03-15 PROCEDURE — 6360000002 HC RX W HCPCS: Performed by: UROLOGY

## 2024-03-15 PROCEDURE — 2580000003 HC RX 258: Performed by: UROLOGY

## 2024-03-15 PROCEDURE — C1769 GUIDE WIRE: HCPCS | Performed by: UROLOGY

## 2024-03-15 PROCEDURE — 82948 REAGENT STRIP/BLOOD GLUCOSE: CPT

## 2024-03-15 PROCEDURE — 2720000010 HC SURG SUPPLY STERILE: Performed by: UROLOGY

## 2024-03-15 PROCEDURE — 7100000010 HC PHASE II RECOVERY - FIRST 15 MIN: Performed by: UROLOGY

## 2024-03-15 PROCEDURE — 3600000003 HC SURGERY LEVEL 3 BASE: Performed by: UROLOGY

## 2024-03-15 PROCEDURE — 6360000002 HC RX W HCPCS: Performed by: REGISTERED NURSE

## 2024-03-15 PROCEDURE — C2617 STENT, NON-COR, TEM W/O DEL: HCPCS | Performed by: UROLOGY

## 2024-03-15 PROCEDURE — 36415 COLL VENOUS BLD VENIPUNCTURE: CPT

## 2024-03-15 PROCEDURE — 7100000001 HC PACU RECOVERY - ADDTL 15 MIN: Performed by: UROLOGY

## 2024-03-15 PROCEDURE — 3700000000 HC ANESTHESIA ATTENDED CARE: Performed by: UROLOGY

## 2024-03-15 PROCEDURE — 3700000001 HC ADD 15 MINUTES (ANESTHESIA): Performed by: UROLOGY

## 2024-03-15 PROCEDURE — C1758 CATHETER, URETERAL: HCPCS | Performed by: UROLOGY

## 2024-03-15 PROCEDURE — 7100000011 HC PHASE II RECOVERY - ADDTL 15 MIN: Performed by: UROLOGY

## 2024-03-15 PROCEDURE — C1747 HC ENDOSCOPE, SINGLE, URINARY TRACT: HCPCS | Performed by: UROLOGY

## 2024-03-15 PROCEDURE — 7100000000 HC PACU RECOVERY - FIRST 15 MIN: Performed by: UROLOGY

## 2024-03-15 DEVICE — URETERAL STENT
Type: IMPLANTABLE DEVICE | Site: URETER | Status: FUNCTIONAL
Brand: PERCUFLEX™ PLUS

## 2024-03-15 RX ORDER — SODIUM CHLORIDE 0.9 % (FLUSH) 0.9 %
5-40 SYRINGE (ML) INJECTION PRN
Status: DISCONTINUED | OUTPATIENT
Start: 2024-03-15 | End: 2024-03-15 | Stop reason: HOSPADM

## 2024-03-15 RX ORDER — OXYBUTYNIN CHLORIDE 5 MG/1
5 TABLET, EXTENDED RELEASE ORAL DAILY
Qty: 14 TABLET | Refills: 0 | Status: SHIPPED | OUTPATIENT
Start: 2024-03-15 | End: 2024-03-29

## 2024-03-15 RX ORDER — SODIUM CHLORIDE 9 MG/ML
INJECTION, SOLUTION INTRAVENOUS PRN
Status: DISCONTINUED | OUTPATIENT
Start: 2024-03-15 | End: 2024-03-15 | Stop reason: HOSPADM

## 2024-03-15 RX ORDER — PROPOFOL 10 MG/ML
INJECTION, EMULSION INTRAVENOUS PRN
Status: DISCONTINUED | OUTPATIENT
Start: 2024-03-15 | End: 2024-03-15 | Stop reason: SDUPTHER

## 2024-03-15 RX ORDER — SODIUM CHLORIDE 0.9 % (FLUSH) 0.9 %
5-40 SYRINGE (ML) INJECTION EVERY 12 HOURS SCHEDULED
Status: DISCONTINUED | OUTPATIENT
Start: 2024-03-15 | End: 2024-03-15 | Stop reason: HOSPADM

## 2024-03-15 RX ORDER — PHENAZOPYRIDINE HYDROCHLORIDE 100 MG/1
100 TABLET, FILM COATED ORAL 3 TIMES DAILY PRN
Qty: 15 TABLET | Refills: 0 | Status: SHIPPED | OUTPATIENT
Start: 2024-03-15 | End: 2024-03-20

## 2024-03-15 RX ORDER — HYDRALAZINE HYDROCHLORIDE 20 MG/ML
INJECTION INTRAMUSCULAR; INTRAVENOUS
Status: DISCONTINUED
Start: 2024-03-15 | End: 2024-03-15 | Stop reason: HOSPADM

## 2024-03-15 RX ORDER — DEXAMETHASONE SODIUM PHOSPHATE 10 MG/ML
INJECTION, EMULSION INTRAMUSCULAR; INTRAVENOUS PRN
Status: DISCONTINUED | OUTPATIENT
Start: 2024-03-15 | End: 2024-03-15 | Stop reason: SDUPTHER

## 2024-03-15 RX ORDER — ONDANSETRON 2 MG/ML
INJECTION INTRAMUSCULAR; INTRAVENOUS PRN
Status: DISCONTINUED | OUTPATIENT
Start: 2024-03-15 | End: 2024-03-15 | Stop reason: SDUPTHER

## 2024-03-15 RX ORDER — DOXYCYCLINE HYCLATE 100 MG
100 TABLET ORAL 2 TIMES DAILY
Qty: 6 TABLET | Refills: 0 | Status: SHIPPED | OUTPATIENT
Start: 2024-03-15 | End: 2024-03-18

## 2024-03-15 RX ORDER — KETOROLAC TROMETHAMINE 10 MG/1
10 TABLET, FILM COATED ORAL EVERY 6 HOURS PRN
Qty: 15 TABLET | Refills: 0 | Status: SHIPPED | OUTPATIENT
Start: 2024-03-15

## 2024-03-15 RX ORDER — LIDOCAINE HCL/PF 100 MG/5ML
SYRINGE (ML) INJECTION PRN
Status: DISCONTINUED | OUTPATIENT
Start: 2024-03-15 | End: 2024-03-15 | Stop reason: SDUPTHER

## 2024-03-15 RX ORDER — HYDRALAZINE HYDROCHLORIDE 20 MG/ML
10 INJECTION INTRAMUSCULAR; INTRAVENOUS EVERY 10 MIN PRN
Status: DISCONTINUED | OUTPATIENT
Start: 2024-03-15 | End: 2024-03-15 | Stop reason: HOSPADM

## 2024-03-15 RX ADMIN — SODIUM CHLORIDE: 9 INJECTION, SOLUTION INTRAVENOUS at 14:04

## 2024-03-15 RX ADMIN — WATER 2000 MG: 1 INJECTION INTRAMUSCULAR; INTRAVENOUS; SUBCUTANEOUS at 14:37

## 2024-03-15 RX ADMIN — PROPOFOL 150 MG: 10 INJECTION, EMULSION INTRAVENOUS at 14:33

## 2024-03-15 RX ADMIN — DEXAMETHASONE SODIUM PHOSPHATE 8 MG: 10 INJECTION, EMULSION INTRAMUSCULAR; INTRAVENOUS at 14:37

## 2024-03-15 RX ADMIN — Medication 60 MG: at 14:33

## 2024-03-15 RX ADMIN — ONDANSETRON 4 MG: 2 INJECTION INTRAMUSCULAR; INTRAVENOUS at 14:37

## 2024-03-15 ASSESSMENT — PAIN SCALES - GENERAL: PAINLEVEL_OUTOF10: 0

## 2024-03-15 NOTE — OP NOTE
FACILITY:  [place], [place]   Yeny Fine   1950  [id number]    DATE: 03/15/24  SURGEON:  Dr. Fausto Shrot Jr, MD , MD  ASSISTANT: Dr. Fausto Short Jr, MD MD  PREOPERATIVE DIAGNOSIS: Left sided kidney stone  POSTOPERATIVE DIAGNOSIS: Left sided kidney stone  PROCEDURES PERFORMED:  1. Cystoscopy   2. Left sided ureteroscopy with holmium laser lithotripsy  3. Left sided stent exchange.  DRAINS: A Left sided 6x26 double J ureteral stent (with string)  SPECIMEN: none  ANESTHESIA: General  ESTIMATED BLOOD LOSS: None.   COMPLICATIONS: None.     INDICATIONS FOR PROCEDURE:  Yeny Fine is a 73 y.o. female presents for Left sided calculus.     After the risks, benefits, alternatives, of the procedure were discussed with the patient, informed consent was obtained.  The patient elected to proceed.     DETAILS OF THE PROCEDURE:  The patient was brought back from the preoperative holding area to the operating suite, and was transferred to the operating table where the patient lay in supine position. EPC's were in place, connected to the machine and the machine was turned on before induction.  General endotracheal anesthesia was induced, and patient was prepped and draped in standard surgical fashion after being placed in dorsolithotomy position. A proper timeout was performed, preoperative antibiotics were given. We began by inserting a cystoscope with a 22 Spanish sheath and 30 degree lens into the patient's urethral meatus and advancing into the bladder without complication.  A pan cystoscopy was preformed and the bladder appeared unremarkable.  We then focused our attention on the Left ureteral orifice, we removed the stent, which we cannulated with our glidewire, advanced up to renal pelvis.  We then used a dual lumen catheter to place a second wire.  Once in good position, we advanced our flexible ureteroscope over the working wire to the renal pelvis under direct visualization.  We identified the

## 2024-03-15 NOTE — PROGRESS NOTES
Pt returned to hospitals room 10. Vitals and assessment as charted. 0.9 infusing, @550ml to count from PACU. Pt has crackers and water. Family at the bedside. Pt and family verbalized understanding of discharge criteria and call light use. Call light in reach.

## 2024-03-15 NOTE — PROGRESS NOTES
Notified Dr. Short that patient took her Effient yesterday. Dr. Short voiced ok to proceed with surgery and to see from anesthesia if its ok from their standpoint. Dr. Macdonald notified voiced its up to the surgeon.

## 2024-03-15 NOTE — ANESTHESIA POSTPROCEDURE EVALUATION
Department of Anesthesiology  Postprocedure Note    Patient: Yeny Fine  MRN: [id number]  YOB: 1950  Date of evaluation: 3/15/2024    Procedure Summary       Date: 03/15/24 Room / Location: [facility] OR 05 / [facility] OR    Anesthesia Start: 1430 Anesthesia Stop: 1510    Procedure: Cystoscopy Left Ureteroscopy Laser Lithotripsy and Left Ureteral Stent Exchange (Left) Diagnosis:       Kidney stone      (Kidney stone [N20.0])    Surgeons: Fausto Short Jr., MD Responsible Provider: Ramón Macdonald DO    Anesthesia Type: general ASA Status: 3            Anesthesia Type: No value filed.    Bhupinder Phase I: Bhupinder Score: 8    Bhupinder Phase II:      Anesthesia Post Evaluation    Patient location during evaluation: PACU  Patient participation: complete - patient participated  Level of consciousness: awake  Airway patency: patent  Nausea & Vomiting: no nausea  Cardiovascular status: hemodynamically stable, hypertensive and blood pressure returned to baseline  Respiratory status: acceptable  Hydration status: stable  Pain management: adequate    No notable events documented.

## 2024-03-15 NOTE — ANESTHESIA PRE PROCEDURE
Department of Anesthesiology  Preprocedure Note       Name:  Yeny Fine   Age:  73 y.o.  :  1950                                          MRN:  [id number]         Date:  3/15/2024      Surgeon: Surgeon(s):  Fausto Short Jr., MD    Procedure: Procedure(s):  Cystoscopy Left Ureteroscopy Laser Lithotripsy Basket Retrieval of Stone Fragments and Left Ureteral Stent Exchange    Medications prior to admission:   Prior to Admission medications    Medication Sig Start Date End Date Taking? Authorizing Provider   oxyBUTYnin (DITROPAN XL) 5 MG extended release tablet Take 1 tablet by mouth daily for 14 days 2/26/24 3/11/24  Fausto Short Jr., MD   ketorolac (TORADOL) 10 MG tablet Take 1 tablet by mouth every 6 hours as needed for Pain 24   Fausto Short Jr., MD   ketorolac (TORADOL) 10 MG tablet Take 1 tablet by mouth every 6 hours as needed for Pain 24  Silver Thompson DO   Semaglutide 7 MG TABS Take 7 mg by mouth daily    Ewelina Shah MD   citalopram (CELEXA) 10 MG tablet Take 1 tablet by mouth daily 1/10/24   Ewelina Shah MD   fluticasone (FLONASE) 50 MCG/ACT nasal spray 2 sprays by Each Nostril route daily 1/15/24   Lolis Tyler APRN - CNP   metoprolol tartrate (LOPRESSOR) 25 MG tablet Take 1 tablet by mouth 2 times daily Takes 1/2 tab BID    Ewelina Shah MD   prasugrel (EFFIENT) 5 MG TABS TAKE 1 TABLET BY MOUTH EVERY DAY 22   Ewelina Shah MD   losartan (COZAAR) 100 MG tablet Take 1 tablet by mouth daily 22   Ewelina Shah MD   ondansetron (ZOFRAN ODT) 4 MG disintegrating tablet Take 1 tablet by mouth every 8 hours as needed for Nausea or Vomiting 21   Neeta Costa MD   acetaminophen (TYLENOL) 325 MG tablet Take 2 tablets by mouth every 6 hours as needed for Pain    Ewelina Shah MD   Multiple Vitamins-Minerals (EYE VITAMINS PO) Take by mouth daily    Ewelina Shah MD   MULTIPLE VITAMIN PO Take

## 2024-03-15 NOTE — PROGRESS NOTES
1508 Pt arrives to PACU, alert to voice. Resp easy and unlabored on room air.     1520 Patient resting in bed, denies pain and nausea. Resp easy and unlabored on room air. VSS    1535 10 mg of hydralazine given for BP.     1545 BG checked, 113. Pt resting in bed, requesting ice chips, tolerated well.     1550 Patient resting in bed, denies pain and nausea. Resp easy and unlabored on room air. VSS.    1555 Patient meets criteria for discharge from PACU, transferred to \[facility]\"" in stable condition.    1558 Report given to pop caicedo

## 2024-03-15 NOTE — DISCHARGE INSTRUCTIONS
Discharge instructions: Ureteroscopy lithotripsy and stent placement (with string):  You may see blood in the urine after the procedure.  This should resolve over the next couple days.  Please stay hydrated.  You may see intermittent blood in the urine while the stent is in place.  This is expected.  You may experience flank pain, and/or frequency/urgency of urination while the stent is in place.  Please use medications prescribed to help with these symptoms.    Pt ok to discharge home in good condition  No heavy lifting, >10 lbs for today  Pt should avoid strenuous activity for today  Pt should walk moderately at home  Pt ok to shower   Pt may resume diet as tolerated  Pt should take Rx as directed  No driving while on narcotics  Please call attending physician or hospital  with questions  Call or Present to ED if fever (> 101F), intractable nausea vomiting or pain.  Rx in chart    Pt should follow up with Dr. Fausto Short Jr, MD, in 3 months with BREONNA and brooke, call to confirm appointment.    Pt should Pull stent in the morning of 3/18.  There may be some pain associated with the stent removal, which is usually self limiting.  We suggest using the pain medication prescribed for you and a nonsteroidal anti-inflammatory such as Ibuprofen, if you are able to take this medication, to control symptoms.  Take Ibuprofen as directed for 24 hrs after stent pull.  Please stay hydrated.  Please call with questions.

## 2024-03-15 NOTE — PROGRESS NOTES
Patient oriented to Same Day department and admitted to Same Day Surgery room 10.   Patient verbalized approval for first name, last initial with physician name on unit whiteboard.     Plan of care reviewed with patient.   Patient room whiteboard filled out and discussed with patient and responsible adult.   Patient and responsible adult offered Same Day Welcome Packet to review.    Call light in reach.   Bed in lowest position, locked, with one bed rail up.   SCDs and warming blanket in place.  Appropriate arm bands on patient.   Bathroom offered.   All questions and concerns of patient addressed.        Meds to Beds:   Patient informed of [facility] Meds to Beds program during admission. Patient has declined use of program.

## 2024-03-18 DIAGNOSIS — N20.1 CALCULUS OF PROXIMAL LEFT URETER: Primary | ICD-10-CM

## 2024-03-22 NOTE — H&P
[facility]  Urology H&P Note     Patient:  Yeny Fine  MRN: [id number]  YOB: 1950    ATTENDING: Fausto Short Jr, MD     CHIEF COMPLAINT:  urologic obstruction    HISTORY OF PRESENT ILLNESS:   The patient is a 73 y.o. female who presents with urologic obstruction    Patient's old records, notes and chart reviewed and summarized above.     Past Medical History:    Past Medical History:   Diagnosis Date    Arthritis     Asthma     situational    Depression     Fibromyalgia     GERD (gastroesophageal reflux disease)     Hx of blood clots 2003    DVT after arthroscopy right knee    Hyperlipidemia     Hypertension     Hypothyroidism     Restless legs syndrome     Type II or unspecified type diabetes mellitus without mention of complication, not stated as uncontrolled     Unspecified sleep apnea     no CPAP       Past Surgical History:    Past Surgical History:   Procedure Laterality Date    BREAST LUMPECTOMY Right 10/17/2013    radial scar removal    CARDIAC CATHETERIZATION  2006??    results ok    COLONOSCOPY      last one 2014??    COLONOSCOPY  08/11/2017    Dr Gaston    CYSTOSCOPY N/A 2/26/2024    Cystoscopy Left Stent Insertion performed by Fausto Short Jr., MD at [facility] OR    EYE SURGERY  feb. 2016    left cataract surgery    HYSTERECTOMY (CERVIX STATUS UNKNOWN)  1992    JOINT REPLACEMENT Right 2013    total knee replacement    KNEE ARTHROSCOPY Right 2003    WA BX OF BREAST,VACUUM ASST,IMAGE GUIDE Right 09/26/2013    Radial scar    SINUS SURGERY  1994    SKIN BIOPSY      TONSILLECTOMY  1955    UPPER GASTROINTESTINAL ENDOSCOPY  07/25/2017    Dr Gaston    URETER SURGERY Left 3/15/2024    Cystoscopy Left Ureteroscopy Laser Lithotripsy and Left Ureteral Stent Exchange performed by Fausto Short Jr., MD at [facility] OR    VENA CAVA FILTER PLACEMENT  2003    casandra filter placed       Medications Prior to Admission:   Prior to Admission medications    Medication Sig Start Date End Date

## 2024-03-27 PROBLEM — N39.0 URINARY TRACT INFECTION: Status: RESOLVED | Noted: 2024-02-26 | Resolved: 2024-03-27

## 2024-04-18 ENCOUNTER — HOSPITAL ENCOUNTER (OUTPATIENT)
Age: 74
Discharge: HOME | End: 2024-04-18
Payer: MEDICARE

## 2024-04-18 ENCOUNTER — HOSPITAL ENCOUNTER (OUTPATIENT)
Dept: GENERAL RADIOLOGY | Age: 74
Discharge: HOME | End: 2024-04-18
Payer: MEDICARE

## 2024-04-18 DIAGNOSIS — N20.1 CALCULUS OF PROXIMAL LEFT URETER: ICD-10-CM

## 2024-04-18 PROCEDURE — 74018 RADEX ABDOMEN 1 VIEW: CPT

## 2024-04-26 ENCOUNTER — HOSPITAL ENCOUNTER (OUTPATIENT)
Dept: WOMENS IMAGING | Age: 74
Discharge: HOME | End: 2024-04-26
Payer: MEDICARE

## 2024-04-26 VITALS — HEIGHT: 63 IN | BODY MASS INDEX: 32.96 KG/M2 | WEIGHT: 186 LBS

## 2024-04-26 DIAGNOSIS — Z12.31 ENCOUNTER FOR SCREENING MAMMOGRAM FOR MALIGNANT NEOPLASM OF BREAST: ICD-10-CM

## 2024-04-26 PROCEDURE — 77063 BREAST TOMOSYNTHESIS BI: CPT

## 2024-05-08 ENCOUNTER — OFFICE VISIT (OUTPATIENT)
Dept: UROLOGY | Age: 74
End: 2024-05-08
Payer: MEDICARE

## 2024-05-08 VITALS — RESPIRATION RATE: 16 BRPM | HEIGHT: 63 IN | WEIGHT: 186 LBS | BODY MASS INDEX: 32.96 KG/M2

## 2024-05-08 DIAGNOSIS — Z51.81 THERAPEUTIC DRUG MONITORING: Primary | ICD-10-CM

## 2024-05-08 PROCEDURE — 1123F ACP DISCUSS/DSCN MKR DOCD: CPT

## 2024-05-08 PROCEDURE — 99214 OFFICE O/P EST MOD 30 MIN: CPT

## 2024-05-08 RX ORDER — POTASSIUM CITRATE 10 MEQ/1
10 TABLET, EXTENDED RELEASE ORAL
Qty: 90 TABLET | Refills: 0 | Status: SHIPPED | OUTPATIENT
Start: 2024-05-08 | End: 2024-05-08

## 2024-05-08 RX ORDER — POTASSIUM CITRATE 10 MEQ/1
10 TABLET, EXTENDED RELEASE ORAL
Qty: 270 TABLET | Refills: 0 | Status: SHIPPED | OUTPATIENT
Start: 2024-05-08

## 2024-05-08 NOTE — PROGRESS NOTES
kidney stones  Marked Hypocitraturia     Plan:     Hx of kidney stones: KUB and renal US in 1 year. Advised to call with flank pain and/or hematuria.     Marked Hypocitraturia: BMP in 2/2024 without hypokalemia. Start Potassium Citrate 10 Meq TID. GFR > 50. Discussed adding lemon to water. BMP in 3 months.         Myriam Gabriel PA-C  Urology

## 2024-05-08 NOTE — PATIENT INSTRUCTIONS
Start Urocit-K three times daily   Add lemon to water as able  Follow-up in 3 months with BMP  Call with questions, comments, or concerns. I recommend going to the ED for further evaluation if you develop fever, chills, nausea, vomiting, chest pain, SOB, or calf pain.

## 2024-05-16 ENCOUNTER — HOSPITAL ENCOUNTER (EMERGENCY)
Age: 74
Discharge: HOME | End: 2024-05-16
Payer: MEDICARE

## 2024-05-16 ENCOUNTER — APPOINTMENT (OUTPATIENT)
Dept: GENERAL RADIOLOGY | Age: 74
End: 2024-05-16
Payer: MEDICARE

## 2024-05-16 VITALS
RESPIRATION RATE: 14 BRPM | HEART RATE: 67 BPM | TEMPERATURE: 97 F | OXYGEN SATURATION: 97 % | SYSTOLIC BLOOD PRESSURE: 145 MMHG | BODY MASS INDEX: 33.66 KG/M2 | DIASTOLIC BLOOD PRESSURE: 93 MMHG | WEIGHT: 190 LBS | HEIGHT: 63 IN

## 2024-05-16 DIAGNOSIS — M79.604 RIGHT LEG PAIN: Primary | ICD-10-CM

## 2024-05-16 PROCEDURE — 99213 OFFICE O/P EST LOW 20 MIN: CPT

## 2024-05-16 PROCEDURE — 73590 X-RAY EXAM OF LOWER LEG: CPT

## 2024-05-16 ASSESSMENT — PAIN SCALES - GENERAL: PAINLEVEL_OUTOF10: 5

## 2024-05-16 ASSESSMENT — PAIN - FUNCTIONAL ASSESSMENT: PAIN_FUNCTIONAL_ASSESSMENT: 0-10

## 2024-05-16 ASSESSMENT — PAIN DESCRIPTION - LOCATION: LOCATION: LEG

## 2024-05-16 ASSESSMENT — PAIN DESCRIPTION - ORIENTATION: ORIENTATION: RIGHT;LOWER
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
## 2024-05-16 NOTE — DISCHARGE INSTRUCTIONS
Rest, ice, elevate right leg.  Over the counter Acetaminophen as needed pain, lidocaine patches to area 12hrs on 12hrs off.  Return for new or worsening symptoms.  Follow up with [facility] in 5-7 days if no improvement.  Go to the ER if swelling increases and increased pain with walking.

## 2024-05-23 ENCOUNTER — NURSE ONLY (OUTPATIENT)
Dept: LAB | Age: 74
End: 2024-05-23

## 2024-05-23 DIAGNOSIS — Z51.81 THERAPEUTIC DRUG MONITORING: ICD-10-CM

## 2024-05-23 LAB
ANION GAP SERPL CALC-SCNC: 12 MEQ/L (ref 8–16)
BUN SERPL-MCNC: 12 MG/DL (ref 7–22)
CALCIUM SERPL-MCNC: 9.6 MG/DL (ref 8.5–10.5)
CHLORIDE SERPL-SCNC: 102 MEQ/L (ref 98–111)
CO2 SERPL-SCNC: 28 MEQ/L (ref 23–33)
CREAT SERPL-MCNC: 0.9 MG/DL (ref 0.4–1.2)
GFR SERPL CREATININE-BSD FRML MDRD: 66 ML/MIN/1.73M2
GLUCOSE SERPL-MCNC: 134 MG/DL (ref 70–108)
POTASSIUM SERPL-SCNC: 4.3 MEQ/L (ref 3.5–5.2)
SODIUM SERPL-SCNC: 142 MEQ/L (ref 135–145)

## 2024-05-24 ENCOUNTER — TELEPHONE (OUTPATIENT)
Dept: UROLOGY | Age: 74
End: 2024-05-24

## 2024-05-24 DIAGNOSIS — Z51.81 THERAPEUTIC DRUG MONITORING: Primary | ICD-10-CM

## 2024-05-24 NOTE — TELEPHONE ENCOUNTER
Patient advised of the lab results,continue the potassium citrate and to repeat the BMP in 3-4 months. She voiced understanding. Order sent via mail.

## 2024-05-24 NOTE — TELEPHONE ENCOUNTER
Reviewed BMP.    Needs BMP in 3-4 months.     Continue Potassium Citrate. Contact office with any side effects    The patient should go to the ED should they develop fever, chills, nausea, vomiting, chest pain, SOB, calf pain, feelings of incomplete emptying, or should they otherwise feel they need evaluated

## 2024-06-04 ENCOUNTER — NURSE ONLY (OUTPATIENT)
Dept: LAB | Age: 74
End: 2024-06-04

## 2024-09-10 ENCOUNTER — TELEPHONE (OUTPATIENT)
Dept: UROLOGY | Age: 74
End: 2024-09-10

## 2024-12-28 ENCOUNTER — HOSPITAL ENCOUNTER (EMERGENCY)
Age: 74
Discharge: HOME | End: 2024-12-28
Payer: MEDICARE

## 2024-12-28 VITALS
HEART RATE: 58 BPM | DIASTOLIC BLOOD PRESSURE: 70 MMHG | OXYGEN SATURATION: 93 % | RESPIRATION RATE: 16 BRPM | TEMPERATURE: 99 F | WEIGHT: 190.6 LBS | SYSTOLIC BLOOD PRESSURE: 151 MMHG | BODY MASS INDEX: 33.76 KG/M2

## 2024-12-28 DIAGNOSIS — J01.40 ACUTE NON-RECURRENT PANSINUSITIS: Primary | ICD-10-CM

## 2024-12-28 PROCEDURE — 99213 OFFICE O/P EST LOW 20 MIN: CPT

## 2024-12-28 RX ORDER — PANTOPRAZOLE SODIUM 40 MG/1
40 TABLET, DELAYED RELEASE ORAL DAILY
COMMUNITY

## 2024-12-28 RX ORDER — FLUTICASONE PROPIONATE 50 MCG
2 SPRAY, SUSPENSION (ML) NASAL DAILY
Qty: 16 G | Refills: 0 | Status: SHIPPED | OUTPATIENT
Start: 2024-12-28

## 2024-12-28 RX ORDER — ASPIRIN 81 MG/1
81 TABLET, CHEWABLE ORAL DAILY
COMMUNITY

## 2024-12-28 RX ORDER — DIPHENOXYLATE HYDROCHLORIDE AND ATROPINE SULFATE 2.5; .025 MG/1; MG/1
TABLET ORAL
COMMUNITY

## 2024-12-28 RX ORDER — BENZONATATE 200 MG/1
200 CAPSULE ORAL 3 TIMES DAILY PRN
Qty: 30 CAPSULE | Refills: 0 | Status: SHIPPED | OUTPATIENT
Start: 2024-12-28 | End: 2025-01-04

## 2024-12-28 RX ORDER — CETIRIZINE HYDROCHLORIDE 10 MG/1
10 TABLET ORAL DAILY
Qty: 30 TABLET | Refills: 0 | Status: SHIPPED | OUTPATIENT
Start: 2024-12-28

## 2024-12-28 ASSESSMENT — PAIN - FUNCTIONAL ASSESSMENT
PAIN_FUNCTIONAL_ASSESSMENT: 0-10
PAIN_FUNCTIONAL_ASSESSMENT: PREVENTS OR INTERFERES SOME ACTIVE ACTIVITIES AND ADLS

## 2024-12-28 ASSESSMENT — PAIN DESCRIPTION - ONSET: ONSET: PROGRESSIVE

## 2024-12-28 ASSESSMENT — ENCOUNTER SYMPTOMS
EYE DISCHARGE: 0
EYE REDNESS: 0
SHORTNESS OF BREATH: 0
VOMITING: 0
NAUSEA: 0
COUGH: 1
TROUBLE SWALLOWING: 0
DIARRHEA: 0
SINUS PRESSURE: 1
SORE THROAT: 0
SINUS PAIN: 1
RHINORRHEA: 1

## 2024-12-28 ASSESSMENT — PAIN DESCRIPTION - PAIN TYPE: TYPE: ACUTE PAIN

## 2024-12-28 ASSESSMENT — PAIN DESCRIPTION - DESCRIPTORS: DESCRIPTORS: ACHING;PRESSURE

## 2024-12-28 ASSESSMENT — PAIN SCALES - GENERAL: PAINLEVEL_OUTOF10: 4

## 2024-12-28 ASSESSMENT — PAIN DESCRIPTION - FREQUENCY: FREQUENCY: CONTINUOUS

## 2024-12-28 ASSESSMENT — PAIN DESCRIPTION - LOCATION: LOCATION: HEAD

## 2024-12-28 NOTE — DISCHARGE INSTRUCTIONS
Prescription for Augmentin.  Use Zyrtec, Flonase, and tessalon Pearles.   Drink plenty of fluids to thin mucus.  Sleep with your head propped up on a pillow.  Inhale steam three of four times daily( exp: sit in bathroom with hot shower running.)  Avoid Trigger and not smoking.  May also clean nasal passages with salt water to ease congestion. Use over-the-counter Tylenol and Motrin for pain or fever.  Follow-up with their PCP or [facility] in 3 to 5 days and worsening symptoms.

## 2024-12-28 NOTE — ED PROVIDER NOTES
[facility] URGENT CARE  Urgent Care Encounter      CHIEF COMPLAINT       Chief Complaint   Patient presents with    Cough     productive    Chest Congestion    Sinusitis    Headache    Generalized Body Aches       Nurses Notes reviewed and I agree except as noted in the HPI.  HISTORY OF PRESENT ILLNESS   Yeny Fine is a 74 y.o. female who presents urgent care for evaluation of cough, nasal and chest congestion, headache, and body aches. Patient reports onset of symptoms 2 weeks ago.  Patient reports symptoms seem to wax and wane a bit but consistently have nasal congestion pressure and pain.  Reports some low-grade fevers off and on.  Reports she has been taking some over-the-counter cough medication without any relief in symptoms.  Headache located in frontal aspect above eyebrows. Rates pain 4/10    REVIEW OF SYSTEMS     Review of Systems   Constitutional:  Positive for fatigue. Negative for chills, diaphoresis and fever.   HENT:  Positive for congestion, rhinorrhea, sinus pressure and sinus pain. Negative for ear pain, sore throat and trouble swallowing.    Eyes:  Negative for discharge and redness.   Respiratory:  Positive for cough. Negative for shortness of breath.    Cardiovascular:  Negative for chest pain.   Gastrointestinal:  Negative for diarrhea, nausea and vomiting.   Genitourinary:  Negative for decreased urine volume.   Musculoskeletal:  Positive for myalgias. Negative for neck pain and neck stiffness.   Skin:  Negative for rash.   Neurological:  Positive for headaches.   Hematological:  Negative for adenopathy.   Psychiatric/Behavioral:  Negative for sleep disturbance.        PAST MEDICAL HISTORY         Diagnosis Date    Arthritis     Asthma     situational    Depression     Fibromyalgia     GERD (gastroesophageal reflux disease)     Hx of blood clots 2003    DVT after arthroscopy right knee    Hyperlipidemia     Hypertension     Hypothyroidism     Restless legs syndrome     Type II

## 2025-03-11 ENCOUNTER — HOSPITAL ENCOUNTER (EMERGENCY)
Age: 75
Discharge: HOME | End: 2025-03-11
Payer: MEDICARE

## 2025-03-11 ENCOUNTER — APPOINTMENT (OUTPATIENT)
Dept: GENERAL RADIOLOGY | Age: 75
End: 2025-03-11
Payer: MEDICARE

## 2025-03-11 VITALS
HEART RATE: 55 BPM | RESPIRATION RATE: 18 BRPM | DIASTOLIC BLOOD PRESSURE: 65 MMHG | OXYGEN SATURATION: 99 % | SYSTOLIC BLOOD PRESSURE: 144 MMHG | WEIGHT: 186 LBS | TEMPERATURE: 97.9 F | BODY MASS INDEX: 32.95 KG/M2

## 2025-03-11 DIAGNOSIS — J18.9 PNEUMONIA OF LEFT LOWER LOBE DUE TO INFECTIOUS ORGANISM: Primary | ICD-10-CM

## 2025-03-11 LAB
BASOPHILS ABSOLUTE: 0 THOU/MM3 (ref 0–0.1)
BASOPHILS NFR BLD AUTO: 0.5 %
BUN BLD-MCNC: 21 MG/DL (ref 8–26)
CHLORIDE BLD-SCNC: 102 MEQ/L (ref 98–109)
CO2 BLD CALC-SCNC: 26 MEQ/L (ref 23–33)
CREAT BLD-MCNC: 1.2 MG/DL (ref 0.5–1.2)
EOSINOPHIL NFR BLD AUTO: 2.5 %
EOSINOPHILS ABSOLUTE: 0.2 THOU/MM3 (ref 0–0.4)
ERYTHROCYTE [DISTWIDTH] IN BLOOD BY AUTOMATED COUNT: 14.6 % (ref 11.5–14.5)
GFR SERPL CREATININE-BSD FRML MDRD: 47 ML/MIN/1.73M2
GLUCOSE BLD-MCNC: 155 MG/DL (ref 70–108)
HCT VFR BLD AUTO: 41.9 % (ref 37–47)
HGB BLD-MCNC: 13.8 GM/DL (ref 12–16)
IMM GRANULOCYTES # BLD AUTO: 0.03 THOU/MM3 (ref 0–0.07)
IMM GRANULOCYTES NFR BLD AUTO: 0.4 %
LYMPHOCYTES ABSOLUTE: 1.6 THOU/MM3 (ref 1–4.8)
LYMPHOCYTES NFR BLD AUTO: 20.5 %
MCH RBC QN AUTO: 29.3 PG (ref 27–31)
MCHC RBC AUTO-ENTMCNC: 32.9 GM/DL (ref 33–37)
MCV RBC AUTO: 89 FL (ref 81–99)
MONOCYTES ABSOLUTE: 0.5 THOU/MM3 (ref 0.4–1.3)
MONOCYTES NFR BLD AUTO: 6.8 %
NEUTROPHILS ABSOLUTE: 5.4 THOU/MM3 (ref 1.8–7.7)
NEUTROPHILS NFR BLD AUTO: 69.3 %
NRBC BLD AUTO-RTO: 0 /100 WBC
PLATELET # BLD AUTO: 198 THOU/MM3 (ref 130–400)
PMV BLD AUTO: 11.2 FL (ref 7.4–10.4)
POTASSIUM BLD-SCNC: 4.1 MEQ/L (ref 3.5–4.9)
RBC # BLD AUTO: 4.71 MILL/MM3 (ref 4.2–5.4)
SODIUM BLD-SCNC: 138 MEQ/L (ref 138–146)
WBC # BLD AUTO: 7.8 THOU/MM3 (ref 4.8–10.8)

## 2025-03-11 PROCEDURE — 82565 ASSAY OF CREATININE: CPT

## 2025-03-11 PROCEDURE — 71046 X-RAY EXAM CHEST 2 VIEWS: CPT

## 2025-03-11 PROCEDURE — 72040 X-RAY EXAM NECK SPINE 2-3 VW: CPT

## 2025-03-11 PROCEDURE — 80051 ELECTROLYTE PANEL: CPT

## 2025-03-11 PROCEDURE — 85025 COMPLETE CBC W/AUTO DIFF WBC: CPT

## 2025-03-11 PROCEDURE — 99215 OFFICE O/P EST HI 40 MIN: CPT

## 2025-03-11 PROCEDURE — 99213 OFFICE O/P EST LOW 20 MIN: CPT | Performed by: NURSE PRACTITIONER

## 2025-03-11 PROCEDURE — 84520 ASSAY OF UREA NITROGEN: CPT

## 2025-03-11 PROCEDURE — 82947 ASSAY GLUCOSE BLOOD QUANT: CPT

## 2025-03-11 PROCEDURE — 36415 COLL VENOUS BLD VENIPUNCTURE: CPT

## 2025-03-11 RX ORDER — DOXYCYCLINE HYCLATE 100 MG
100 TABLET ORAL 2 TIMES DAILY
Qty: 14 TABLET | Refills: 0 | Status: SHIPPED | OUTPATIENT
Start: 2025-03-11 | End: 2025-03-18

## 2025-03-11 ASSESSMENT — PAIN - FUNCTIONAL ASSESSMENT: PAIN_FUNCTIONAL_ASSESSMENT: NONE - DENIES PAIN

## 2025-03-11 ASSESSMENT — ENCOUNTER SYMPTOMS: COUGH: 1

## 2025-03-11 NOTE — ED TRIAGE NOTES
Pt to room 1 with c/o some fatigue and weakness starting approx 1 week ago. She reports that she started coughing a pprox 2 days ago and felt SOB while she was laying in bed this morning and states she is afraid she has pneumonia. She is also somewhat confused regarding her medications and is confused as to why she takes some of them and is she is taking some of the listed ones. She also reports that she has not been taking that Wgovy that was prescribed by her PCP because she can't afford the $400 that is was going to cost from the pharmacy.

## 2025-03-11 NOTE — ED PROVIDER NOTES
[facility] URGENT CARE  UrgentCare Encounter      CHIEFCOMPLAINT       Chief Complaint   Patient presents with    Fatigue    Cough     Non productive       Nurses Notes reviewed and I agree except as noted in the HPI.  HISTORY OF PRESENT ILLNESS   Yeny Fine is a 74 y.o. female who presents to urgent care with complaints of fatigue and some weakness.  Symptoms started about a week ago.  States that she has had a cough.  She complains of some neck pain.  She does have a follow-up appoint with her primary care provider tomorrow to review urine results.  She is concerned about pneumonia.    REVIEW OF SYSTEMS     Review of Systems   Constitutional:  Positive for fatigue.   Respiratory:  Positive for cough.        PAST MEDICAL HISTORY         Diagnosis Date    Arthritis     Asthma     situational    Depression     Fibromyalgia     GERD (gastroesophageal reflux disease)     Hx of blood clots 2003    DVT after arthroscopy right knee    Hyperlipidemia     Hypertension     Hypothyroidism     Restless legs syndrome     Type II or unspecified type diabetes mellitus without mention of complication, not stated as uncontrolled     Unspecified sleep apnea     no CPAP       SURGICAL HISTORY     Patient  has a past surgical history that includes sinus surgery (1994); Tonsillectomy (1955); Hysterectomy (1992); Knee arthroscopy (Right, 2003); Vena Cava Filter Placement (2003); Cardiac catheterization (2006??); Colonoscopy; joint replacement (Right, 2013); eye surgery (feb. 2016); skin biopsy; Upper gastrointestinal endoscopy (07/25/2017); Colonoscopy (08/11/2017); pr bx of breast,vacuum asst,image guide (Right, 09/26/2013); Breast lumpectomy (Right, 10/17/2013); Cystoscopy (N/A, 2/26/2024); and Ureter surgery (Left, 3/15/2024).    CURRENT MEDICATIONS       Discharge Medication List as of 3/11/2025 12:29 PM        CONTINUE these medications which have NOT CHANGED    Details   aspirin 81 MG chewable tablet Take 1 tablet by mouth

## 2025-03-11 NOTE — DISCHARGE INSTRUCTIONS
Follow-up with your primary care provider as scheduled tomorrow.    Complete full course of oral antibiotics.    Report to the ER with any new or severe symptoms.

## 2025-04-05 ENCOUNTER — HOSPITAL ENCOUNTER (EMERGENCY)
Age: 75
Discharge: HOME | End: 2025-04-05
Payer: MEDICARE

## 2025-04-05 VITALS
OXYGEN SATURATION: 94 % | DIASTOLIC BLOOD PRESSURE: 71 MMHG | WEIGHT: 178 LBS | HEIGHT: 63 IN | BODY MASS INDEX: 31.54 KG/M2 | HEART RATE: 60 BPM | RESPIRATION RATE: 16 BRPM | SYSTOLIC BLOOD PRESSURE: 135 MMHG | TEMPERATURE: 98.1 F

## 2025-04-05 DIAGNOSIS — J30.9 ALLERGIC RHINITIS, UNSPECIFIED SEASONALITY, UNSPECIFIED TRIGGER: Primary | ICD-10-CM

## 2025-04-05 PROCEDURE — 99213 OFFICE O/P EST LOW 20 MIN: CPT | Performed by: NURSE PRACTITIONER

## 2025-04-05 PROCEDURE — 99213 OFFICE O/P EST LOW 20 MIN: CPT

## 2025-04-05 RX ORDER — FEXOFENADINE HCL 180 MG/1
180 TABLET ORAL DAILY
Qty: 30 TABLET | Refills: 0 | Status: SHIPPED | OUTPATIENT
Start: 2025-04-05 | End: 2025-05-05

## 2025-04-05 RX ORDER — BENZONATATE 200 MG/1
200 CAPSULE ORAL 3 TIMES DAILY PRN
Qty: 21 CAPSULE | Refills: 0 | Status: SHIPPED | OUTPATIENT
Start: 2025-04-05 | End: 2025-04-12

## 2025-04-05 ASSESSMENT — ENCOUNTER SYMPTOMS
SWOLLEN GLANDS: 0
CHOKING: 0
APNEA: 0
CHEST TIGHTNESS: 0
SHORTNESS OF BREATH: 0
COUGH: 1
SORE THROAT: 0
SINUS PAIN: 0
RHINORRHEA: 1
WHEEZING: 0
STRIDOR: 0

## 2025-04-05 ASSESSMENT — PAIN - FUNCTIONAL ASSESSMENT: PAIN_FUNCTIONAL_ASSESSMENT: NONE - DENIES PAIN

## 2025-04-05 ASSESSMENT — LIFESTYLE VARIABLES
HOW OFTEN DO YOU HAVE A DRINK CONTAINING ALCOHOL: NEVER
HOW MANY STANDARD DRINKS CONTAINING ALCOHOL DO YOU HAVE ON A TYPICAL DAY: PATIENT DOES NOT DRINK

## 2025-04-05 NOTE — ED PROVIDER NOTES
[facility] URGENT CARE  Urgent Care Encounter      CHIEF COMPLAINT       Chief Complaint   Patient presents with    Cough    Cold Symptoms       Nurses Notes reviewed and I agree except as noted in the HPI.  HISTORY OFPRESENT ILLNESS   Yeny Fine is a 74 y.o.  The history is provided by the patient. No  was used.   Cold Symptoms  Presenting symptoms: cough and rhinorrhea    Presenting symptoms: no congestion, no ear pain, no facial pain, no fatigue, no fever and no sore throat    Severity:  Moderate  Onset quality:  Gradual  Duration:  1 week  Timing:  Constant  Progression:  Unchanged  Chronicity:  New  Relieved by:  Nothing  Worsened by:  Nothing  Ineffective treatments:  None tried  Associated symptoms: headaches    Associated symptoms: no arthralgias, no myalgias, no neck pain, no sinus pain, no sneezing, no swollen glands and no wheezing    Risk factors: being elderly    Risk factors: no chronic cardiac disease, no chronic kidney disease, no chronic respiratory disease, no diabetes mellitus, no immunosuppression, no recent illness, no recent travel and no sick contacts        REVIEW OF SYSTEMS     Review of Systems   Constitutional:  Negative for activity change, appetite change, chills, diaphoresis, fatigue and fever.   HENT:  Positive for rhinorrhea. Negative for congestion, ear pain, sinus pain, sneezing and sore throat.    Respiratory:  Positive for cough. Negative for apnea, choking, chest tightness, shortness of breath, wheezing and stridor.    Cardiovascular:  Negative for chest pain, palpitations and leg swelling.   Musculoskeletal:  Negative for arthralgias, myalgias and neck pain.   Neurological:  Positive for headaches.       PAST MEDICAL HISTORY         Diagnosis Date    Arthritis     Asthma     situational    Depression     Fibromyalgia     GERD (gastroesophageal reflux disease)     Hx of blood clots 2003    DVT after arthroscopy right knee    Hyperlipidemia

## 2025-04-05 NOTE — DISCHARGE INSTRUCTIONS
I did discuss clinical findings with the patient as well as vital signs in assessment findings.  He was advised that the Patient has signs and symptoms of seasonal allergies. Patient is afebrile and stable. Patient can use Tylenol and/or OTC cough syrup. Avoid tobacco use/exposure,Take medication as directed,Drink Lots of fluids and Use Inhalers as directed if prescribed.   Advised to follow up with family doctor in the next 2-3 days for reevaluation.  The patient may return to urgent care if does not get better or symptoms worsen.  However the patient is advised to go to ER immediately if present symptoms worsen, high fever >102 , vomiting, breathing difficulty, chest pain, lethargy or new symptoms develop. Patient/ parents understands this approach of home management and agrees to the treatment plan.

## 2025-04-17 ENCOUNTER — APPOINTMENT (OUTPATIENT)
Dept: CT IMAGING | Age: 75
End: 2025-04-17
Payer: MEDICARE

## 2025-04-17 ENCOUNTER — HOSPITAL ENCOUNTER (EMERGENCY)
Age: 75
Discharge: HOME | End: 2025-04-17
Attending: FAMILY MEDICINE
Payer: MEDICARE

## 2025-04-17 VITALS
RESPIRATION RATE: 18 BRPM | SYSTOLIC BLOOD PRESSURE: 145 MMHG | TEMPERATURE: 98.5 F | BODY MASS INDEX: 35.44 KG/M2 | OXYGEN SATURATION: 98 % | WEIGHT: 200 LBS | DIASTOLIC BLOOD PRESSURE: 68 MMHG | HEART RATE: 59 BPM | HEIGHT: 63 IN

## 2025-04-17 DIAGNOSIS — N30.01 ACUTE CYSTITIS WITH HEMATURIA: Primary | ICD-10-CM

## 2025-04-17 DIAGNOSIS — R31.9 HEMATURIA, UNSPECIFIED TYPE: ICD-10-CM

## 2025-04-17 DIAGNOSIS — R10.9 RIGHT FLANK PAIN: ICD-10-CM

## 2025-04-17 LAB
ALBUMIN SERPL BCG-MCNC: 3.5 G/DL (ref 3.4–4.9)
ALP SERPL-CCNC: 94 U/L (ref 38–126)
ALT SERPL W/O P-5'-P-CCNC: 10 U/L (ref 10–35)
ANION GAP SERPL CALC-SCNC: 11 MEQ/L (ref 8–16)
AST SERPL-CCNC: 14 U/L (ref 10–35)
BACTERIA URNS QL MICRO: ABNORMAL /HPF
BASOPHILS ABSOLUTE: 0 THOU/MM3 (ref 0–0.1)
BASOPHILS NFR BLD AUTO: 0.2 %
BILIRUB SERPL-MCNC: 0.2 MG/DL (ref 0.3–1.2)
BILIRUB UR QL STRIP.AUTO: NEGATIVE
BUN SERPL-MCNC: 23 MG/DL (ref 8–23)
CALCIUM SERPL-MCNC: 8.8 MG/DL (ref 8.8–10.2)
CASTS #/AREA URNS LPF: ABNORMAL /LPF
CHARACTER UR: ABNORMAL
CHLORIDE SERPL-SCNC: 104 MEQ/L (ref 98–111)
CO2 SERPL-SCNC: 25 MEQ/L (ref 22–29)
COLOR, UA: ABNORMAL
CREAT SERPL-MCNC: 1 MG/DL (ref 0.5–0.9)
CRYSTALS URNS MICRO: ABNORMAL
DEPRECATED RDW RBC AUTO: 47 FL (ref 35–45)
EOSINOPHIL NFR BLD AUTO: 0.2 %
EOSINOPHILS ABSOLUTE: 0 THOU/MM3 (ref 0–0.4)
EPITHELIAL CELLS, UA: ABNORMAL /HPF
ERYTHROCYTE [DISTWIDTH] IN BLOOD BY AUTOMATED COUNT: 14.4 % (ref 11.5–14.5)
GFR SERPL CREATININE-BSD FRML MDRD: 58 ML/MIN/1.73M2
GLUCOSE SERPL-MCNC: 154 MG/DL (ref 74–109)
GLUCOSE UR QL STRIP.AUTO: 250 MG/DL
HCT VFR BLD AUTO: 37.3 % (ref 37–47)
HGB BLD-MCNC: 11.6 GM/DL (ref 12–16)
HGB UR QL STRIP.AUTO: ABNORMAL
IMM GRANULOCYTES # BLD AUTO: 0.11 THOU/MM3 (ref 0–0.07)
IMM GRANULOCYTES NFR BLD AUTO: 0.9 %
KETONES UR QL STRIP.AUTO: 15
LACTIC ACID, SEPSIS: 2.2 MMOL/L (ref 0.5–1.9)
LYMPHOCYTES ABSOLUTE: 1 THOU/MM3 (ref 1–4.8)
LYMPHOCYTES NFR BLD AUTO: 7.8 %
MCH RBC QN AUTO: 28 PG (ref 26–33)
MCHC RBC AUTO-ENTMCNC: 31.1 GM/DL (ref 32.2–35.5)
MCV RBC AUTO: 90.1 FL (ref 81–99)
MONOCYTES ABSOLUTE: 0.6 THOU/MM3 (ref 0.4–1.3)
MONOCYTES NFR BLD AUTO: 4.5 %
NEUTROPHILS ABSOLUTE: 10.6 THOU/MM3 (ref 1.8–7.7)
NEUTROPHILS NFR BLD AUTO: 86.4 %
NITRITE UR QL STRIP: POSITIVE
NRBC BLD AUTO-RTO: 0 /100 WBC
OSMOLALITY SERPL CALC.SUM OF ELEC: 286.2 MOSMOL/KG (ref 275–300)
PH UR STRIP.AUTO: 7 [PH] (ref 5–9)
PLATELET # BLD AUTO: 163 THOU/MM3 (ref 130–400)
PMV BLD AUTO: 10.2 FL (ref 9.4–12.4)
POTASSIUM SERPL-SCNC: 4.4 MEQ/L (ref 3.5–5.2)
PROT SERPL-MCNC: 6 G/DL (ref 6.4–8.3)
PROT UR STRIP.AUTO-MCNC: >= 300 MG/DL
RBC # BLD AUTO: 4.14 MILL/MM3 (ref 4.2–5.4)
RBC URINE: > 200 /HPF
RENAL EPI CELLS #/AREA URNS HPF: ABNORMAL /[HPF]
SODIUM SERPL-SCNC: 140 MEQ/L (ref 135–145)
SP GR UR REFRACT.AUTO: 1.02 (ref 1–1.03)
UROBILINOGEN, URINE: 2 EU/DL (ref 0–1)
WBC # BLD AUTO: 12.3 THOU/MM3 (ref 4.8–10.8)
WBC #/AREA URNS HPF: > 200 /HPF
WBC #/AREA URNS HPF: ABNORMAL /[HPF]
YEAST LIKE FUNGI URNS QL MICRO: ABNORMAL

## 2025-04-17 PROCEDURE — 96374 THER/PROPH/DIAG INJ IV PUSH: CPT

## 2025-04-17 PROCEDURE — 6360000002 HC RX W HCPCS: Performed by: FAMILY MEDICINE

## 2025-04-17 PROCEDURE — 6360000004 HC RX CONTRAST MEDICATION: Performed by: FAMILY MEDICINE

## 2025-04-17 PROCEDURE — 96375 TX/PRO/DX INJ NEW DRUG ADDON: CPT

## 2025-04-17 PROCEDURE — 87086 URINE CULTURE/COLONY COUNT: CPT

## 2025-04-17 PROCEDURE — 96361 HYDRATE IV INFUSION ADD-ON: CPT

## 2025-04-17 PROCEDURE — 36415 COLL VENOUS BLD VENIPUNCTURE: CPT

## 2025-04-17 PROCEDURE — 81001 URINALYSIS AUTO W/SCOPE: CPT

## 2025-04-17 PROCEDURE — 83605 ASSAY OF LACTIC ACID: CPT

## 2025-04-17 PROCEDURE — 2580000003 HC RX 258: Performed by: FAMILY MEDICINE

## 2025-04-17 PROCEDURE — 74177 CT ABD & PELVIS W/CONTRAST: CPT

## 2025-04-17 PROCEDURE — 85025 COMPLETE CBC W/AUTO DIFF WBC: CPT

## 2025-04-17 PROCEDURE — 2500000003 HC RX 250 WO HCPCS: Performed by: FAMILY MEDICINE

## 2025-04-17 PROCEDURE — 80053 COMPREHEN METABOLIC PANEL: CPT

## 2025-04-17 PROCEDURE — 99285 EMERGENCY DEPT VISIT HI MDM: CPT

## 2025-04-17 RX ORDER — ONDANSETRON 2 MG/ML
4 INJECTION INTRAMUSCULAR; INTRAVENOUS ONCE
Status: COMPLETED | OUTPATIENT
Start: 2025-04-17 | End: 2025-04-17

## 2025-04-17 RX ORDER — CEFUROXIME AXETIL 500 MG/1
500 TABLET ORAL 2 TIMES DAILY
Qty: 14 TABLET | Refills: 0 | Status: SHIPPED | OUTPATIENT
Start: 2025-04-17 | End: 2025-04-24

## 2025-04-17 RX ORDER — IOPAMIDOL 755 MG/ML
80 INJECTION, SOLUTION INTRAVASCULAR
Status: COMPLETED | OUTPATIENT
Start: 2025-04-17 | End: 2025-04-17

## 2025-04-17 RX ORDER — 0.9 % SODIUM CHLORIDE 0.9 %
1000 INTRAVENOUS SOLUTION INTRAVENOUS ONCE
Status: COMPLETED | OUTPATIENT
Start: 2025-04-17 | End: 2025-04-17

## 2025-04-17 RX ORDER — PHENAZOPYRIDINE HYDROCHLORIDE 100 MG/1
100 TABLET, FILM COATED ORAL 3 TIMES DAILY PRN
Qty: 9 TABLET | Refills: 0 | Status: SHIPPED | OUTPATIENT
Start: 2025-04-17 | End: 2025-04-20

## 2025-04-17 RX ADMIN — SODIUM CHLORIDE 1000 ML: 0.9 INJECTION, SOLUTION INTRAVENOUS at 19:01

## 2025-04-17 RX ADMIN — IOPAMIDOL 80 ML: 755 INJECTION, SOLUTION INTRAVENOUS at 20:30

## 2025-04-17 RX ADMIN — ONDANSETRON 4 MG: 2 INJECTION, SOLUTION INTRAMUSCULAR; INTRAVENOUS at 19:02

## 2025-04-17 RX ADMIN — WATER 1000 MG: 1 INJECTION INTRAMUSCULAR; INTRAVENOUS; SUBCUTANEOUS at 20:39

## 2025-04-17 ASSESSMENT — PAIN DESCRIPTION - LOCATION
LOCATION: VAGINA
LOCATION: VAGINA

## 2025-04-17 ASSESSMENT — PAIN - FUNCTIONAL ASSESSMENT
PAIN_FUNCTIONAL_ASSESSMENT: NONE - DENIES PAIN
PAIN_FUNCTIONAL_ASSESSMENT: NONE - DENIES PAIN
PAIN_FUNCTIONAL_ASSESSMENT: 0-10
PAIN_FUNCTIONAL_ASSESSMENT: 0-10

## 2025-04-17 ASSESSMENT — PAIN SCALES - GENERAL
PAINLEVEL_OUTOF10: 1
PAINLEVEL_OUTOF10: 1

## 2025-04-17 NOTE — ED PROVIDER NOTES
EMERGENCY DEPARTMENT ENCOUNTER     CHIEF COMPLAINT   Chief Complaint   Patient presents with    Hematuria        HPI   Yeny Fine is a 74 y.o. female with multiple medical co-morbidities, who presents with right flank and abdominal pain, and gross hematuria, onset was today. The duration has been constant since the onset.  The patient has associated nausea.  There are no alleviating factors.  The context is that the symptoms started spontaneously, without any known precipitants. She also has pain radiating from her right buttock down to her leg, without any bladder retention or incontinence.    REVIEW OF SYSTEMS   GI: pos nausea, denies vomiting or diarrhea  General: No fevers  See HPI for further details.   All other review of systems  are reviewed and are otherwise negative.     PAST MEDICAL & SURGICAL HISTORY   Past Medical History:   Diagnosis Date    Arthritis     Asthma     situational    Depression     Fibromyalgia     GERD (gastroesophageal reflux disease)     Hx of blood clots 2003    DVT after arthroscopy right knee    Hyperlipidemia     Hypertension     Hypothyroidism     Restless legs syndrome     Type II or unspecified type diabetes mellitus without mention of complication, not stated as uncontrolled     Unspecified sleep apnea     no CPAP     Past Surgical History:   Procedure Laterality Date    BREAST LUMPECTOMY Right 10/17/2013    radial scar removal    CARDIAC CATHETERIZATION  2006??    results ok    COLONOSCOPY      last one 2014??    COLONOSCOPY  08/11/2017    Dr Gaston    CYSTOSCOPY N/A 2/26/2024    Cystoscopy Left Stent Insertion performed by Fausto Short Jr., MD at [facility] OR    EYE SURGERY  feb. 2016    left cataract surgery    HYSTERECTOMY (CERVIX STATUS UNKNOWN)  1992    JOINT REPLACEMENT Right 2013    total knee replacement    KNEE ARTHROSCOPY Right 2003    OR BX OF BREAST,VACUUM ASST,IMAGE GUIDE Right 09/26/2013    Radial scar    SINUS SURGERY  1994    SKIN BIOPSY      TONSILLECTOMY

## 2025-04-17 NOTE — ED NOTES
Pt is laying in bed with lights off watching tv, pt asks for warm blanket and water. Pt is breathing regular and unlabored on room air at this time. Pt has no signs of distress to note at this time, pt states she has pain when urinating described as burning and rated 10/10. Call light within reach.

## 2025-04-17 NOTE — ED TRIAGE NOTES
Pt presents to the ED for difficulty with urination and blood in her urine x3 days. Pt states earlier today she passed a large clot when urinating. Upon arrival to the ED pt is incontinent of stool. Pt complaining of vaginal pain.

## 2025-04-18 ENCOUNTER — HOSPITAL ENCOUNTER (EMERGENCY)
Age: 75
Discharge: HOME | End: 2025-04-18
Payer: MEDICARE

## 2025-04-18 ENCOUNTER — APPOINTMENT (OUTPATIENT)
Dept: GENERAL RADIOLOGY | Age: 75
End: 2025-04-18
Payer: MEDICARE

## 2025-04-18 ENCOUNTER — APPOINTMENT (OUTPATIENT)
Dept: CT IMAGING | Age: 75
End: 2025-04-18
Payer: MEDICARE

## 2025-04-18 VITALS
HEART RATE: 69 BPM | TEMPERATURE: 98.4 F | WEIGHT: 178 LBS | OXYGEN SATURATION: 96 % | DIASTOLIC BLOOD PRESSURE: 78 MMHG | SYSTOLIC BLOOD PRESSURE: 158 MMHG | BODY MASS INDEX: 31.53 KG/M2 | RESPIRATION RATE: 18 BRPM

## 2025-04-18 DIAGNOSIS — N30.01 ACUTE CYSTITIS WITH HEMATURIA: ICD-10-CM

## 2025-04-18 DIAGNOSIS — M25.551 RIGHT HIP PAIN: Primary | ICD-10-CM

## 2025-04-18 PROCEDURE — 73502 X-RAY EXAM HIP UNI 2-3 VIEWS: CPT

## 2025-04-18 PROCEDURE — 72131 CT LUMBAR SPINE W/O DYE: CPT

## 2025-04-18 PROCEDURE — 76376 3D RENDER W/INTRP POSTPROCES: CPT

## 2025-04-18 PROCEDURE — 6370000000 HC RX 637 (ALT 250 FOR IP): Performed by: PHYSICIAN ASSISTANT

## 2025-04-18 PROCEDURE — 6370000000 HC RX 637 (ALT 250 FOR IP)

## 2025-04-18 PROCEDURE — 99284 EMERGENCY DEPT VISIT MOD MDM: CPT

## 2025-04-18 RX ORDER — CEFUROXIME AXETIL 250 MG/1
500 TABLET ORAL ONCE
Status: COMPLETED | OUTPATIENT
Start: 2025-04-18 | End: 2025-04-18

## 2025-04-18 RX ORDER — PREDNISONE 20 MG/1
60 TABLET ORAL ONCE
Status: COMPLETED | OUTPATIENT
Start: 2025-04-18 | End: 2025-04-18

## 2025-04-18 RX ORDER — LIDOCAINE 50 MG/G
1 PATCH TOPICAL DAILY
Qty: 10 PATCH | Refills: 0 | Status: SHIPPED | OUTPATIENT
Start: 2025-04-18 | End: 2025-04-26

## 2025-04-18 RX ORDER — ACETAMINOPHEN 325 MG/1
650 TABLET ORAL ONCE
Status: COMPLETED | OUTPATIENT
Start: 2025-04-18 | End: 2025-04-18

## 2025-04-18 RX ORDER — OXYCODONE AND ACETAMINOPHEN 5; 325 MG/1; MG/1
1 TABLET ORAL EVERY 6 HOURS PRN
Qty: 10 TABLET | Refills: 0 | Status: SHIPPED | OUTPATIENT
Start: 2025-04-18 | End: 2025-04-21

## 2025-04-18 RX ORDER — LIDOCAINE 4 G/G
1 PATCH TOPICAL ONCE
Status: DISCONTINUED | OUTPATIENT
Start: 2025-04-18 | End: 2025-04-19 | Stop reason: HOSPADM

## 2025-04-18 RX ORDER — OXYCODONE AND ACETAMINOPHEN 5; 325 MG/1; MG/1
1 TABLET ORAL ONCE
Refills: 0 | Status: COMPLETED | OUTPATIENT
Start: 2025-04-18 | End: 2025-04-18

## 2025-04-18 RX ADMIN — OXYCODONE AND ACETAMINOPHEN 1 TABLET: 325; 5 TABLET ORAL at 23:03

## 2025-04-18 RX ADMIN — CEFUROXIME AXETIL 500 MG: 250 TABLET ORAL at 20:28

## 2025-04-18 RX ADMIN — ACETAMINOPHEN 650 MG: 325 TABLET ORAL at 18:52

## 2025-04-18 RX ADMIN — PREDNISONE 60 MG: 20 TABLET ORAL at 23:21

## 2025-04-18 ASSESSMENT — PAIN - FUNCTIONAL ASSESSMENT: PAIN_FUNCTIONAL_ASSESSMENT: 0-10

## 2025-04-18 ASSESSMENT — PAIN DESCRIPTION - ORIENTATION: ORIENTATION: RIGHT

## 2025-04-18 ASSESSMENT — PAIN SCALES - GENERAL: PAINLEVEL_OUTOF10: 8

## 2025-04-18 ASSESSMENT — PAIN DESCRIPTION - LOCATION: LOCATION: HIP

## 2025-04-18 NOTE — DISCHARGE INSTRUCTIONS
WE EVALUATED YOU FOR YOUR BURNING UPON URINATION AND BLOOD. YOUR CAT SCAN SHOWED A LIKELY BLADDER INFECTION, WHICH SOMETIMES COULD CAUSE BLEEDING.    YOU WERE GIVEN A DOSE OF ANTIBIOTICS PRIOR TO DISCHARGE. TAKE CEFTIN ANTIBIOTICS AS DIRECTED FOR NEXT WEEK. TAKE PYRIDIUM FOR YOUR BURNING FEELING, BUT BE AWARE THIS MEDICATION WILL TURN YOUR URINE ORANGE IN COLOR.    RETURN IF WORSE.

## 2025-04-18 NOTE — ED PROVIDER NOTES
[facility] EMERGENCY DEPARTMENT      EMERGENCY MEDICINE     Pt Name: Yeny Fine  MRN: [id number]  Birthdate 1950  Date of evaluation: 4/18/2025  Provider: Jud England PA-C    CHIEF COMPLAINT       Chief Complaint   Patient presents with    Hip Pain     HISTORY OF PRESENT ILLNESS   Yeny Fine is a pleasant 74 y.o. female who presents to the emergency department from home, as a walk in to the ED lobby for evaluation of right hip pain radiating into right leg.  Patient has had this pain for a few days and was seen yesterday and brought it up, but feels it is not improving.  Patient unable to ambulate, but with pain.  Patient denies injury.  Denies fever, leg swelling, paresthesias, urinary retention or incontinence, bowel incontinence.     PASTMEDICAL HISTORY     Past Medical History:   Diagnosis Date    Arthritis     Asthma     situational    Depression     Fibromyalgia     GERD (gastroesophageal reflux disease)     Hx of blood clots 2003    DVT after arthroscopy right knee    Hyperlipidemia     Hypertension     Hypothyroidism     Restless legs syndrome     Type II or unspecified type diabetes mellitus without mention of complication, not stated as uncontrolled     Unspecified sleep apnea     no CPAP       Patient Active Problem List   Diagnosis Code    Depression F32.A    Type II or unspecified type diabetes mellitus without mention of complication, not stated as uncontrolled E11.9    GERD (gastroesophageal reflux disease) K21.9    Hypertension I10    Asthma J45.909    Hyperlipidemia E78.5    Personal history of DVT (deep vein thrombosis) Z86.718    Fibromyalgia M79.7    Obesity (BMI 30-39.9) E66.9    Radial scar of breast N64.89    Obstructive sleep apnea of adult G47.33     SURGICAL HISTORY       Past Surgical History:   Procedure Laterality Date    BREAST LUMPECTOMY Right 10/17/2013    radial scar removal    CARDIAC CATHETERIZATION  2006??    results ok    COLONOSCOPY      last one 2014??           The patient was seen and examined. Appropriate diagnostic testing was performed and results reviewed with the patient.      The results of pertinent diagnostic studies and exam findings were discussed. The patient’s provisional diagnosis and plan of care were discussed with the patient and present family who expressed understanding. Any medications were reviewed and indications and risks of medications were discussed with the patient /family present. Strict verbal and written return precautions, instructions and appropriate follow-up provided to  the patient.     ED Medications administered this visit:  (None if blank)  Medications   lidocaine 4 % external patch 1 patch (1 patch TransDERmal Patch Applied 4/18/25 1850)   cefUROXime (CEFTIN) tablet 500 mg (500 mg Oral Given 4/18/25 2028)   acetaminophen (TYLENOL) tablet 650 mg (650 mg Oral Given 4/18/25 1852)         PROCEDURES: (None if blank)      CRITICAL CARE: (None if blank)      DISCHARGE PRESCRIPTIONS: (None if blank)  New Prescriptions    DICLOFENAC SODIUM (VOLTAREN) 1 % GEL    Apply 4 g topically 4 times daily       FINAL IMPRESSION      1. Right hip pain          DISPOSITION/PLAN   DISPOSITION                 OUTPATIENT FOLLOW UP THE PATIENT:  No follow-up provider specified.    PAM Conley Rachel A, PA-C  04/18/25 [id number]

## 2025-04-18 NOTE — ED TRIAGE NOTES
Pt presents to the ED with complaints of R hip and leg pain. Pt was seen in ED yesterday and diagnosed with UTI.Pt states she told them yesterday about the pain but no one did anything. Pt reports medications are still at the pharmacy. Pt states she is having to use a cane to get around since the pain.

## 2025-04-18 NOTE — ED NOTES
Pt is laying in bed with no requests at this time. Pt is breathing regulra and unlabord on room air at this time. Call light wtihin reach.

## 2025-04-19 LAB — BACTERIA UR CULT: NORMAL

## 2025-04-19 NOTE — ED PROVIDER NOTES
Patient was turned over to me by Jud England PA-C pending lumbar reconstruction film result.  Unfortunately there was a long delay, many hours in getting that result.  Patient had been medicated with Tylenol, cefotetan, and Lidoderm patch.  On reexam patient felt improved and was able to ambulate to the bathroom.  She reported that the pain was decreased but still there.  Patient remedicated with Percocet and prednisone.  Imaging results showed:    CT LUMBAR RECONSTRUCTION WO POST PROCESS   Final Result   Multilevel lumbar spondylosis without acute fracture.      This document has been electronically signed by: Alex Stinson MD on    04/18/2025 10:50 PM      All CTs at this facility use dose modulation techniques and iterative    reconstructions, and/or weight-based dosing   when appropriate to reduce radiation to a low as reasonably achievable.      XR HIP 2-3 VW W PELVIS RIGHT   Final Result   No acute findings.      This document has been electronically signed by: Alex Stinson MD on    04/18/2025 08:36 PM        Patient and family were comfortable plan of discharge home and anticipatory guidance was given. I have given the patient strict written and verbal instructions about care at home, follow-up, and signs and symptoms of worsening of condition and they did verbalize understanding.      ICD-10-CM    1. Right hip pain  M25.551 oxyCODONE-acetaminophen (PERCOCET) 5-325 MG per tablet      2. Acute cystitis with hematuria  N30.01              Kelsy Tomlin PA-C  04/18/25 1214

## 2025-04-19 NOTE — DISCHARGE INSTRUCTIONS
Please take Tylenol per over-the-counter instructions as needed for pain.  You may also use gel prescribed to help relieve the pain.     Please  and take the medications that were prescribed to you for your urinary tract infection.    Return to the emergency department for worsening symptoms, inability to move or feel extremities, chest pain, shortness of breath, development of high fever, intractable vomiting, lightheadedness, discoloration of skin, or any other acute or concern.

## 2025-04-26 ENCOUNTER — APPOINTMENT (OUTPATIENT)
Dept: CT IMAGING | Age: 75
DRG: 270 | End: 2025-04-26
Payer: MEDICARE

## 2025-04-26 ENCOUNTER — APPOINTMENT (OUTPATIENT)
Dept: INTERVENTIONAL RADIOLOGY/VASCULAR | Age: 75
DRG: 270 | End: 2025-04-26
Payer: MEDICARE

## 2025-04-26 ENCOUNTER — HOSPITAL ENCOUNTER (INPATIENT)
Age: 75
LOS: 3 days | Discharge: HOME HEALTH | DRG: 270 | End: 2025-04-29
Attending: INTERNAL MEDICINE | Admitting: INTERNAL MEDICINE
Payer: MEDICARE

## 2025-04-26 DIAGNOSIS — I82.4Y1 ACUTE DEEP VEIN THROMBOSIS (DVT) OF PROXIMAL VEIN OF RIGHT LOWER EXTREMITY (HCC): Primary | ICD-10-CM

## 2025-04-26 DIAGNOSIS — I82.411 ACUTE DEEP VEIN THROMBOSIS (DVT) OF FEMORAL VEIN OF RIGHT LOWER EXTREMITY (HCC): ICD-10-CM

## 2025-04-26 DIAGNOSIS — E66.9 OBESITY (BMI 30-39.9): ICD-10-CM

## 2025-04-26 LAB
ANION GAP SERPL CALC-SCNC: 14 MEQ/L (ref 8–16)
APTT PPP: 28.8 SECONDS (ref 22–38)
BASOPHILS ABSOLUTE: 0 THOU/MM3 (ref 0–0.1)
BASOPHILS NFR BLD AUTO: 0.5 %
BUN SERPL-MCNC: 15 MG/DL (ref 8–23)
CALCIUM SERPL-MCNC: 8.9 MG/DL (ref 8.8–10.2)
CHLORIDE SERPL-SCNC: 102 MEQ/L (ref 98–111)
CO2 SERPL-SCNC: 23 MEQ/L (ref 22–29)
CREAT SERPL-MCNC: 0.7 MG/DL (ref 0.5–0.9)
CRP SERPL-MCNC: 5.93 MG/DL (ref 0–0.5)
DEPRECATED MEAN GLUCOSE BLD GHB EST-ACNC: 165 MG/DL (ref 70–126)
DEPRECATED RDW RBC AUTO: 48.1 FL (ref 35–45)
DEPRECATED RDW RBC AUTO: 49.3 FL (ref 35–45)
ECHO BSA: 1.89 M2
EOSINOPHIL NFR BLD AUTO: 1.1 %
EOSINOPHILS ABSOLUTE: 0.1 THOU/MM3 (ref 0–0.4)
ERYTHROCYTE [DISTWIDTH] IN BLOOD BY AUTOMATED COUNT: 14.8 % (ref 11.5–14.5)
ERYTHROCYTE [DISTWIDTH] IN BLOOD BY AUTOMATED COUNT: 15.1 % (ref 11.5–14.5)
GFR SERPL CREATININE-BSD FRML MDRD: 89 ML/MIN/1.73M2
GLUCOSE SERPL-MCNC: 151 MG/DL (ref 74–109)
HBA1C MFR BLD HPLC: 7.5 % (ref 4–6)
HCT VFR BLD AUTO: 32.3 % (ref 37–47)
HCT VFR BLD AUTO: 35.6 % (ref 37–47)
HEPARIN UNFRACTIONATED: 0.1 U/ML (ref 0.3–0.7)
HEPARIN UNFRACTIONATED: 1.41 U/ML (ref 0.3–0.7)
HGB BLD-MCNC: 10.3 GM/DL (ref 12–16)
HGB BLD-MCNC: 11.4 GM/DL (ref 12–16)
IMM GRANULOCYTES # BLD AUTO: 0.07 THOU/MM3 (ref 0–0.07)
IMM GRANULOCYTES NFR BLD AUTO: 0.9 %
INR PPP: 1.07 (ref 0.85–1.13)
LYMPHOCYTES ABSOLUTE: 1.2 THOU/MM3 (ref 1–4.8)
LYMPHOCYTES NFR BLD AUTO: 15.4 %
MCH RBC QN AUTO: 28.6 PG (ref 26–33)
MCH RBC QN AUTO: 29.2 PG (ref 26–33)
MCHC RBC AUTO-ENTMCNC: 31.9 GM/DL (ref 32.2–35.5)
MCHC RBC AUTO-ENTMCNC: 32 GM/DL (ref 32.2–35.5)
MCV RBC AUTO: 89.7 FL (ref 81–99)
MCV RBC AUTO: 91 FL (ref 81–99)
MONOCYTES ABSOLUTE: 0.8 THOU/MM3 (ref 0.4–1.3)
MONOCYTES NFR BLD AUTO: 10.4 %
NEUTROPHILS ABSOLUTE: 5.8 THOU/MM3 (ref 1.8–7.7)
NEUTROPHILS NFR BLD AUTO: 71.7 %
NRBC BLD AUTO-RTO: 0 /100 WBC
OSMOLALITY SERPL CALC.SUM OF ELEC: 281.3 MOSMOL/KG (ref 275–300)
PLATELET # BLD AUTO: 153 THOU/MM3 (ref 130–400)
PLATELET # BLD AUTO: 179 THOU/MM3 (ref 130–400)
PMV BLD AUTO: 10.2 FL (ref 9.4–12.4)
PMV BLD AUTO: 10.3 FL (ref 9.4–12.4)
POTASSIUM SERPL-SCNC: 4.4 MEQ/L (ref 3.5–5.2)
RBC # BLD AUTO: 3.6 MILL/MM3 (ref 4.2–5.4)
RBC # BLD AUTO: 3.91 MILL/MM3 (ref 4.2–5.4)
SODIUM SERPL-SCNC: 139 MEQ/L (ref 135–145)
WBC # BLD AUTO: 10.2 THOU/MM3 (ref 4.8–10.8)
WBC # BLD AUTO: 8.1 THOU/MM3 (ref 4.8–10.8)

## 2025-04-26 PROCEDURE — 85730 THROMBOPLASTIN TIME PARTIAL: CPT

## 2025-04-26 PROCEDURE — 85027 COMPLETE CBC AUTOMATED: CPT

## 2025-04-26 PROCEDURE — 99291 CRITICAL CARE FIRST HOUR: CPT | Performed by: INTERNAL MEDICINE

## 2025-04-26 PROCEDURE — 6360000004 HC RX CONTRAST MEDICATION: Performed by: INTERNAL MEDICINE

## 2025-04-26 PROCEDURE — 71275 CT ANGIOGRAPHY CHEST: CPT

## 2025-04-26 PROCEDURE — 85520 HEPARIN ASSAY: CPT

## 2025-04-26 PROCEDURE — 80048 BASIC METABOLIC PNL TOTAL CA: CPT

## 2025-04-26 PROCEDURE — 93971 EXTREMITY STUDY: CPT

## 2025-04-26 PROCEDURE — 96374 THER/PROPH/DIAG INJ IV PUSH: CPT

## 2025-04-26 PROCEDURE — 85025 COMPLETE CBC W/AUTO DIFF WBC: CPT

## 2025-04-26 PROCEDURE — 99285 EMERGENCY DEPT VISIT HI MDM: CPT

## 2025-04-26 PROCEDURE — 83036 HEMOGLOBIN GLYCOSYLATED A1C: CPT

## 2025-04-26 PROCEDURE — 1200000003 HC TELEMETRY R&B

## 2025-04-26 PROCEDURE — 85610 PROTHROMBIN TIME: CPT

## 2025-04-26 PROCEDURE — 6370000000 HC RX 637 (ALT 250 FOR IP): Performed by: INTERNAL MEDICINE

## 2025-04-26 PROCEDURE — 86140 C-REACTIVE PROTEIN: CPT

## 2025-04-26 PROCEDURE — 6360000002 HC RX W HCPCS: Performed by: PHYSICIAN ASSISTANT

## 2025-04-26 PROCEDURE — 36415 COLL VENOUS BLD VENIPUNCTURE: CPT

## 2025-04-26 RX ORDER — HEPARIN SODIUM 10000 [USP'U]/100ML
5-30 INJECTION, SOLUTION INTRAVENOUS CONTINUOUS
Status: DISCONTINUED | OUTPATIENT
Start: 2025-04-26 | End: 2025-04-28

## 2025-04-26 RX ORDER — ATORVASTATIN CALCIUM 10 MG/1
10 TABLET, FILM COATED ORAL DAILY
Status: DISCONTINUED | OUTPATIENT
Start: 2025-04-27 | End: 2025-04-29 | Stop reason: HOSPADM

## 2025-04-26 RX ORDER — ACETAMINOPHEN 325 MG/1
650 TABLET ORAL EVERY 6 HOURS PRN
Status: DISCONTINUED | OUTPATIENT
Start: 2025-04-26 | End: 2025-04-29 | Stop reason: HOSPADM

## 2025-04-26 RX ORDER — LOSARTAN POTASSIUM 100 MG/1
100 TABLET ORAL DAILY
Status: DISCONTINUED | OUTPATIENT
Start: 2025-04-27 | End: 2025-04-27

## 2025-04-26 RX ORDER — CITALOPRAM HYDROBROMIDE 20 MG/1
10 TABLET ORAL DAILY
Status: DISCONTINUED | OUTPATIENT
Start: 2025-04-27 | End: 2025-04-29 | Stop reason: HOSPADM

## 2025-04-26 RX ORDER — ONDANSETRON 2 MG/ML
4 INJECTION INTRAMUSCULAR; INTRAVENOUS EVERY 6 HOURS PRN
Status: DISCONTINUED | OUTPATIENT
Start: 2025-04-26 | End: 2025-04-29 | Stop reason: HOSPADM

## 2025-04-26 RX ORDER — MORPHINE SULFATE 2 MG/ML
1 INJECTION, SOLUTION INTRAMUSCULAR; INTRAVENOUS EVERY 6 HOURS PRN
Status: ACTIVE | OUTPATIENT
Start: 2025-04-26 | End: 2025-04-29

## 2025-04-26 RX ORDER — PANTOPRAZOLE SODIUM 40 MG/1
40 TABLET, DELAYED RELEASE ORAL DAILY
Status: DISCONTINUED | OUTPATIENT
Start: 2025-04-27 | End: 2025-04-29 | Stop reason: HOSPADM

## 2025-04-26 RX ORDER — HEPARIN SODIUM 1000 [USP'U]/ML
80 INJECTION, SOLUTION INTRAVENOUS; SUBCUTANEOUS PRN
Status: DISCONTINUED | OUTPATIENT
Start: 2025-04-26 | End: 2025-04-28

## 2025-04-26 RX ORDER — METOPROLOL TARTRATE 50 MG
25 TABLET ORAL 2 TIMES DAILY
Status: DISCONTINUED | OUTPATIENT
Start: 2025-04-26 | End: 2025-04-29 | Stop reason: HOSPADM

## 2025-04-26 RX ORDER — ASPIRIN 81 MG/1
81 TABLET, CHEWABLE ORAL DAILY
Status: DISCONTINUED | OUTPATIENT
Start: 2025-04-27 | End: 2025-04-29 | Stop reason: HOSPADM

## 2025-04-26 RX ORDER — IOPAMIDOL 755 MG/ML
80 INJECTION, SOLUTION INTRAVASCULAR
Status: COMPLETED | OUTPATIENT
Start: 2025-04-26 | End: 2025-04-26

## 2025-04-26 RX ORDER — DIPHENOXYLATE HYDROCHLORIDE AND ATROPINE SULFATE 2.5; .025 MG/1; MG/1
1 TABLET ORAL 4 TIMES DAILY PRN
Status: DISCONTINUED | OUTPATIENT
Start: 2025-04-26 | End: 2025-04-29 | Stop reason: HOSPADM

## 2025-04-26 RX ORDER — IOPAMIDOL 755 MG/ML
80 INJECTION, SOLUTION INTRAVASCULAR
Status: DISCONTINUED | OUTPATIENT
Start: 2025-04-26 | End: 2025-04-26

## 2025-04-26 RX ORDER — FLUTICASONE PROPIONATE 50 MCG
2 SPRAY, SUSPENSION (ML) NASAL DAILY
Status: DISCONTINUED | OUTPATIENT
Start: 2025-04-26 | End: 2025-04-26

## 2025-04-26 RX ORDER — CETIRIZINE HYDROCHLORIDE 10 MG/1
10 TABLET ORAL DAILY
Status: DISCONTINUED | OUTPATIENT
Start: 2025-04-27 | End: 2025-04-26

## 2025-04-26 RX ORDER — LEVOTHYROXINE SODIUM 25 UG/1
25 TABLET ORAL DAILY
Status: DISCONTINUED | OUTPATIENT
Start: 2025-04-27 | End: 2025-04-29 | Stop reason: HOSPADM

## 2025-04-26 RX ORDER — HEPARIN SODIUM 1000 [USP'U]/ML
80 INJECTION, SOLUTION INTRAVENOUS; SUBCUTANEOUS ONCE
Status: COMPLETED | OUTPATIENT
Start: 2025-04-26 | End: 2025-04-26

## 2025-04-26 RX ORDER — HEPARIN SODIUM 1000 [USP'U]/ML
40 INJECTION, SOLUTION INTRAVENOUS; SUBCUTANEOUS PRN
Status: DISCONTINUED | OUTPATIENT
Start: 2025-04-26 | End: 2025-04-28

## 2025-04-26 RX ADMIN — IOPAMIDOL 80 ML: 755 INJECTION, SOLUTION INTRAVENOUS at 21:45

## 2025-04-26 RX ADMIN — METOPROLOL TARTRATE 25 MG: 50 TABLET, FILM COATED ORAL at 20:29

## 2025-04-26 RX ADMIN — HEPARIN SODIUM 6500 UNITS: 1000 INJECTION INTRAVENOUS; SUBCUTANEOUS at 15:15

## 2025-04-26 RX ADMIN — HEPARIN SODIUM 18 UNITS/KG/HR: 10000 INJECTION, SOLUTION INTRAVENOUS at 15:15

## 2025-04-26 ASSESSMENT — PAIN SCALES - GENERAL
PAINLEVEL_OUTOF10: 0
PAINLEVEL_OUTOF10: 7

## 2025-04-26 ASSESSMENT — PAIN DESCRIPTION - PAIN TYPE: TYPE: ACUTE PAIN

## 2025-04-26 ASSESSMENT — PAIN DESCRIPTION - LOCATION: LOCATION: LEG

## 2025-04-26 ASSESSMENT — PAIN - FUNCTIONAL ASSESSMENT: PAIN_FUNCTIONAL_ASSESSMENT: 0-10

## 2025-04-26 ASSESSMENT — PAIN DESCRIPTION - ORIENTATION: ORIENTATION: RIGHT

## 2025-04-26 NOTE — ED NOTES
ED to inpatient nurses report      Chief Complaint:  Chief Complaint   Patient presents with    Leg Pain     Right upper    Leg Swelling     Right upper     Present to ED from: Home    MOA:     LOC: alert and orientated to name, place, date  Mobility: Independent  Oxygen Baseline: 0    Current needs required: 0     Code Status:   Prior    What abnormal results were found and what did you give/do to treat them? DVT (heparin gtt)  Any procedures or intervention occur? No    Mental Status:  Level of Consciousness: Alert (0)    Psych Assessment:        Vitals:  Patient Vitals for the past 24 hrs:   BP Temp Pulse Resp SpO2 Height Weight   04/26/25 1600 (!) 143/57 -- 58 14 98 % -- --   04/26/25 1536 -- -- 57 13 98 % -- --   04/26/25 1216 (!) 145/79 98.1 °F (36.7 °C) 58 16 99 % 1.6 m (5' 3\") 80.7 kg (178 lb)        LDAs:   Peripheral IV 04/26/25 Left Antecubital (Active)   Site Assessment Clean, dry & intact 04/26/25 1535   Line Status Infusing 04/26/25 1535   Phlebitis Assessment No symptoms 04/26/25 1535   Infiltration Assessment 0 04/26/25 1535   Dressing Status Clean, dry & intact 04/26/25 1535   Dressing Type Transparent 04/26/25 1535       Ambulatory Status:  No data recorded    Diagnosis:  DISPOSITION Admitted 04/26/2025 04:11:56 PM   Final diagnoses:   Acute deep vein thrombosis (DVT) of proximal vein of right lower extremity (HCC)        Consults:  IP CONSULT TO PHARMACY     Pain Score:  Pain Assessment  Pain Assessment: 0-10  Pain Level: 7  Pain Location: Leg  Pain Orientation: Right  Pain Type: Acute pain    C-SSRS:   Risk of Suicide: No Risk    Sepsis Screening:       [place] Fall Risk:       Swallow Screening        Preferred Language:   English      ALLERGIES     Patient has no known allergies.    SURGICAL HISTORY       Past Surgical History:   Procedure Laterality Date    BREAST LUMPECTOMY Right 10/17/2013    radial scar removal    CARDIAC CATHETERIZATION  2006??    results ok    COLONOSCOPY      last one

## 2025-04-26 NOTE — H&P
Hospitalist - History & Physical      Patient: Yeny KAMARA Rody    Unit/Bed:21/021A  YOB: 1950  MRN: [id number]   Acct: [id number]   PCP: Tracy Jon APRN - CNP    Date of Service: Pt seen/examined on 04/26/25  and Admitted to Inpatient with expected LOS greater than two midnights due to medical therapy.     Chief Complaint:  RLE swelling and pain    Assessment and Plan:-    Extensive DVT throughout the right lower extremity  History of CAD with stent followed by [facility] cardiology  Diabetes mellitus type  Essential hypertension  Depression  Fibromyalgia  YAS uses CPAP  Mild obesity      I am concerned about her extensive acute DVT throughout the right lower extremity including the right common femoral, SFJ, superficial femoral, greater saphenous popliteal posterior tibial and peroneal vein.  I recommended to the ER staff we need to get CTA of the chest  and I will order echocardiogram.  IV heparin has been started hemodynamically stable at this time.  Patient also denies any personal history of malignancy in the past    I also called and spoke with Dr. Farmer, our IR doc was on-call this weekend and explained the current situation.  He will   Evaluate in the morning to see if she is going to be candidate for thrombectomy, and I spoke to the pt about this in the emergency room.  Patient repeatedly denies any DVT in the past given that there is some documentation of that after arthroscopy she repeatedly claims she never had a DVT, had Rt TKR  at O in the past.    Diabetes wise patient does not check sugars on a regular basis ,she lost her glucometer last check claims was 130 the POC, and not sure about the A1c we will plan on checking 1.      Hypertension will continue her outpatient medications and adjust as tolerated/needed.    CAD will continue her outpatient medications no active anginal symptoms at this time will be going up to a telemetry floor.  As outlined above normally she follows up with  tablet Take 2 tablets by mouth every 6 hours as needed for Pain      Multiple Vitamins-Minerals (EYE VITAMINS PO) Take by mouth daily      MULTIPLE VITAMIN PO Take by mouth daily      Lactobacillus (ACIDOPHILUS PO) Take by mouth daily Indications: 1Billion 3 daily      atorvastatin (LIPITOR) 10 MG tablet Take 1 tablet by mouth daily      levothyroxine (SYNTHROID) 25 MCG tablet Take 1 tablet by mouth Daily      atenolol (TENORMIN) 25 MG tablet Take 1 tablet by mouth daily         Allergies:    Patient has no known allergies.    Social History:    reports that she has never smoked. She has never been exposed to tobacco smoke. She has never used smokeless tobacco. She reports that she does not drink alcohol and does not use drugs.    Family History:       Problem Relation Age of Onset    Arthritis Mother     Cancer Mother         lung    Diabetes Mother     Heart Disease Father     High Blood Pressure Father     Thyroid Cancer Sister     Other Sister         graves disease     No family history of DVTs or PE    Diet:  No diet orders on file    Review of systems:   Pertinent positives as noted in the HPI. All other systems reviewed and negative.    PHYSICAL EXAM:  BP (!) 145/79   Pulse 58   Temp 98.1 °F (36.7 °C)   Resp 16   Ht 1.6 m (5' 3\")   Wt 80.7 kg (178 lb)   SpO2 99%   BMI 31.53 kg/m²   General appearance: No apparent distress, appears stated age and cooperative.  HEENT: Normal cephalic, atraumatic without obvious deformity. Pupils equal, round, and reactive to light.  Extra ocular muscles intact. Conjunctivae/corneas clear.  Neck: Supple, with full range of motion. No jugular venous distention. Trachea midline.  Respiratory:  Normal respiratory effort. Clear to auscultation, bilaterally without Rales/Wheezes/Rhonchi.  Cardiovascular: Regular rate and rhythm with normal S1/S2 without murmurs, rubs or gallops.  Abdomen: Soft, non-tender, non-distended with normal bowel sounds.  Musculoskeletal:  No

## 2025-04-26 NOTE — ED PROVIDER NOTES
[facility] MED SURG 8AB      EMERGENCY MEDICINE     Pt Name: Yeny Fine  MRN: [id number]  Birthdate 1950  Date of evaluation: 4/26/2025  Provider: PIERCE Pollock    CHIEF COMPLAINT       Chief Complaint   Patient presents with    Leg Pain     Right upper    Leg Swelling     Right upper     HISTORY OF PRESENT ILLNESS   Yeny Fine is a pleasant 74 y.o. female who presents to the emergency department from from home, by private vehicle for evaluation of right eye pain and swelling.  She has had right leg pain for about 2 weeks.  She has been seen twice in the emergency department for this and she did have a swell at that time.  She states it started to swell up on her last night.  She was concerned about a DVT..        PASTMEDICAL HISTORY     Past Medical History:   Diagnosis Date    Arthritis     Asthma     situational    Depression     Fibromyalgia     GERD (gastroesophageal reflux disease)     Hx of blood clots 2003    DVT after arthroscopy right knee    Hyperlipidemia     Hypertension     Hypothyroidism     Restless legs syndrome     Type II or unspecified type diabetes mellitus without mention of complication, not stated as uncontrolled     Unspecified sleep apnea     no CPAP       Patient Active Problem List   Diagnosis Code    Depression F32.A    Type II or unspecified type diabetes mellitus without mention of complication, not stated as uncontrolled E11.9    GERD (gastroesophageal reflux disease) K21.9    Hypertension I10    Asthma J45.909    Hyperlipidemia E78.5    Personal history of DVT (deep vein thrombosis) Z86.718    Fibromyalgia M79.7    Obesity (BMI 30-39.9) E66.9    Radial scar of breast N64.89    Obstructive sleep apnea of adult G47.33    Acute deep vein thrombosis (DVT) of femoral vein of right lower extremity (HCC) I82.411     SURGICAL HISTORY       Past Surgical History:   Procedure Laterality Date    BREAST LUMPECTOMY Right 10/17/2013    radial scar removal    CARDIAC CATHETERIZATION     diphenoxylate-atropine (LOMOTIL) 2.5-0.025 MG per tablet 1 tablet (has no administration in time range)   cetirizine (ZYRTEC) tablet 10 mg (has no administration in time range)   fluticasone (FLONASE) 50 MCG/ACT nasal spray 2 spray (has no administration in time range)   levothyroxine (SYNTHROID) tablet 25 mcg (has no administration in time range)   losartan (COZAAR) tablet 100 mg (has no administration in time range)   metoprolol tartrate (LOPRESSOR) tablet 25 mg (has no administration in time range)   ondansetron (ZOFRAN) injection 4 mg (has no administration in time range)   pantoprazole (PROTONIX) tablet 40 mg (has no administration in time range)   Semaglutide TABS 7 mg (has no administration in time range)   sulfur hexafluoride microspheres (LUMASON) 60.7-25 MG injection 2 mL (has no administration in time range)   morphine (PF) injection 1 mg (has no administration in time range)   heparin (porcine) injection 6,500 Units (6,500 Units IntraVENous Given 4/26/25 1515)         PROCEDURES: (None if blank)      CRITICAL CARE: (None if blank)      DISCHARGE PRESCRIPTIONS: (None if blank)  Current Discharge Medication List          FINAL IMPRESSION      1. Acute deep vein thrombosis (DVT) of proximal vein of right lower extremity (HCC)    2. Obesity (BMI 30-39.9)          DISPOSITION/PLAN   DISPOSITION Admitted 04/26/2025 04:11:56 PM               OUTPATIENT FOLLOW UP THE PATIENT:  No follow-up provider specified.    PIERCE Pollock Jeremy R, PA  04/26/25 [id number]

## 2025-04-27 ENCOUNTER — APPOINTMENT (OUTPATIENT)
Dept: INTERVENTIONAL RADIOLOGY/VASCULAR | Age: 75
DRG: 270 | End: 2025-04-27
Payer: MEDICARE

## 2025-04-27 LAB
ANION GAP SERPL CALC-SCNC: 9 MEQ/L (ref 8–16)
BASOPHILS ABSOLUTE: 0 THOU/MM3 (ref 0–0.1)
BASOPHILS NFR BLD AUTO: 0.5 %
BUN SERPL-MCNC: 17 MG/DL (ref 8–23)
CALCIUM SERPL-MCNC: 9.1 MG/DL (ref 8.8–10.2)
CHLORIDE SERPL-SCNC: 104 MEQ/L (ref 98–111)
CO2 SERPL-SCNC: 25 MEQ/L (ref 22–29)
CREAT SERPL-MCNC: 0.7 MG/DL (ref 0.5–0.9)
DEPRECATED RDW RBC AUTO: 55.7 FL (ref 35–45)
EOSINOPHIL NFR BLD AUTO: 1.4 %
EOSINOPHILS ABSOLUTE: 0.1 THOU/MM3 (ref 0–0.4)
ERYTHROCYTE [DISTWIDTH] IN BLOOD BY AUTOMATED COUNT: 15.1 % (ref 11.5–14.5)
GFR SERPL CREATININE-BSD FRML MDRD: 89 ML/MIN/1.73M2
GLUCOSE SERPL-MCNC: 160 MG/DL (ref 74–109)
HCT VFR BLD AUTO: 34.1 % (ref 37–47)
HEPARIN UNFRACTIONATED: 0.43 U/ML (ref 0.3–0.7)
HEPARIN UNFRACTIONATED: 0.66 U/ML (ref 0.3–0.7)
HEPARIN UNFRACTIONATED: 0.81 U/ML (ref 0.3–0.7)
HEPARIN UNFRACTIONATED: 0.89 U/ML (ref 0.3–0.7)
HGB BLD-MCNC: 9.7 GM/DL (ref 12–16)
HYPOCHROMIA BLD QL SMEAR: PRESENT
IMM GRANULOCYTES # BLD AUTO: 0.09 THOU/MM3 (ref 0–0.07)
IMM GRANULOCYTES NFR BLD AUTO: 1 %
LYMPHOCYTES ABSOLUTE: 1.7 THOU/MM3 (ref 1–4.8)
LYMPHOCYTES NFR BLD AUTO: 19.4 %
MCH RBC QN AUTO: 28.7 PG (ref 26–33)
MCHC RBC AUTO-ENTMCNC: 28.4 GM/DL (ref 32.2–35.5)
MCV RBC AUTO: 100.9 FL (ref 81–99)
MONOCYTES ABSOLUTE: 1 THOU/MM3 (ref 0.4–1.3)
MONOCYTES NFR BLD AUTO: 11 %
NEUTROPHILS ABSOLUTE: 5.9 THOU/MM3 (ref 1.8–7.7)
NEUTROPHILS NFR BLD AUTO: 66.7 %
NRBC BLD AUTO-RTO: 0 /100 WBC
PLATELET # BLD AUTO: 157 THOU/MM3 (ref 130–400)
PMV BLD AUTO: 10.1 FL (ref 9.4–12.4)
POTASSIUM SERPL-SCNC: 4.7 MEQ/L (ref 3.5–5.2)
RBC # BLD AUTO: 3.38 MILL/MM3 (ref 4.2–5.4)
SODIUM SERPL-SCNC: 138 MEQ/L (ref 135–145)
WBC # BLD AUTO: 8.8 THOU/MM3 (ref 4.8–10.8)

## 2025-04-27 PROCEDURE — 37187 VENOUS MECH THROMBECTOMY: CPT

## 2025-04-27 PROCEDURE — 75774 ARTERY X-RAY EACH VESSEL: CPT

## 2025-04-27 PROCEDURE — 06CY3ZZ EXTIRPATION OF MATTER FROM LOWER VEIN, PERCUTANEOUS APPROACH: ICD-10-PCS | Performed by: RADIOLOGY

## 2025-04-27 PROCEDURE — 76937 US GUIDE VASCULAR ACCESS: CPT

## 2025-04-27 PROCEDURE — 36010 PLACE CATHETER IN VEIN: CPT

## 2025-04-27 PROCEDURE — 85025 COMPLETE CBC W/AUTO DIFF WBC: CPT

## 2025-04-27 PROCEDURE — 36415 COLL VENOUS BLD VENIPUNCTURE: CPT

## 2025-04-27 PROCEDURE — 75820 VEIN X-RAY ARM/LEG: CPT

## 2025-04-27 PROCEDURE — 99233 SBSQ HOSP IP/OBS HIGH 50: CPT | Performed by: INTERNAL MEDICINE

## 2025-04-27 PROCEDURE — 75825 VEIN X-RAY TRUNK: CPT

## 2025-04-27 PROCEDURE — 1200000003 HC TELEMETRY R&B

## 2025-04-27 PROCEDURE — 6360000002 HC RX W HCPCS: Performed by: RADIOLOGY

## 2025-04-27 PROCEDURE — 6360000002 HC RX W HCPCS: Performed by: INTERNAL MEDICINE

## 2025-04-27 PROCEDURE — 80048 BASIC METABOLIC PNL TOTAL CA: CPT

## 2025-04-27 PROCEDURE — 85520 HEPARIN ASSAY: CPT

## 2025-04-27 PROCEDURE — 2709999900 IR GUIDED THROMB MECH VEIN

## 2025-04-27 PROCEDURE — 6370000000 HC RX 637 (ALT 250 FOR IP): Performed by: INTERNAL MEDICINE

## 2025-04-27 PROCEDURE — C1894 INTRO/SHEATH, NON-LASER: HCPCS

## 2025-04-27 PROCEDURE — 6360000004 HC RX CONTRAST MEDICATION: Performed by: RADIOLOGY

## 2025-04-27 RX ORDER — LOSARTAN POTASSIUM 50 MG/1
50 TABLET ORAL DAILY
Status: DISCONTINUED | OUTPATIENT
Start: 2025-04-28 | End: 2025-04-29 | Stop reason: HOSPADM

## 2025-04-27 RX ORDER — FENTANYL CITRATE 50 UG/ML
INJECTION, SOLUTION INTRAMUSCULAR; INTRAVENOUS PRN
Status: COMPLETED | OUTPATIENT
Start: 2025-04-27 | End: 2025-04-27

## 2025-04-27 RX ORDER — IOPAMIDOL 612 MG/ML
INJECTION, SOLUTION INTRAVASCULAR PRN
Status: COMPLETED | OUTPATIENT
Start: 2025-04-27 | End: 2025-04-27

## 2025-04-27 RX ORDER — MIDAZOLAM HYDROCHLORIDE 1 MG/ML
INJECTION, SOLUTION INTRAMUSCULAR; INTRAVENOUS PRN
Status: COMPLETED | OUTPATIENT
Start: 2025-04-27 | End: 2025-04-27

## 2025-04-27 RX ADMIN — MIDAZOLAM 0.5 MG: 1 INJECTION INTRAMUSCULAR; INTRAVENOUS at 11:58

## 2025-04-27 RX ADMIN — ATORVASTATIN CALCIUM 10 MG: 10 TABLET, FILM COATED ORAL at 09:03

## 2025-04-27 RX ADMIN — MIDAZOLAM 1 MG: 1 INJECTION INTRAMUSCULAR; INTRAVENOUS at 11:30

## 2025-04-27 RX ADMIN — METOPROLOL TARTRATE 25 MG: 50 TABLET, FILM COATED ORAL at 09:04

## 2025-04-27 RX ADMIN — FENTANYL CITRATE 50 MCG: 50 INJECTION, SOLUTION INTRAMUSCULAR; INTRAVENOUS at 11:30

## 2025-04-27 RX ADMIN — LOSARTAN POTASSIUM 100 MG: 100 TABLET, FILM COATED ORAL at 09:03

## 2025-04-27 RX ADMIN — IOPAMIDOL 70 ML: 612 INJECTION, SOLUTION INTRAVENOUS at 12:35

## 2025-04-27 RX ADMIN — HEPARIN SODIUM 13 UNITS/KG/HR: 10000 INJECTION, SOLUTION INTRAVENOUS at 10:39

## 2025-04-27 RX ADMIN — LEVOTHYROXINE SODIUM 25 MCG: 0.03 TABLET ORAL at 09:03

## 2025-04-27 RX ADMIN — ASPIRIN 81 MG: 81 TABLET, CHEWABLE ORAL at 09:03

## 2025-04-27 RX ADMIN — METOPROLOL TARTRATE 25 MG: 50 TABLET, FILM COATED ORAL at 19:31

## 2025-04-27 RX ADMIN — FENTANYL CITRATE 50 MCG: 50 INJECTION, SOLUTION INTRAMUSCULAR; INTRAVENOUS at 11:58

## 2025-04-27 RX ADMIN — PANTOPRAZOLE SODIUM 40 MG: 40 TABLET, DELAYED RELEASE ORAL at 09:03

## 2025-04-27 RX ADMIN — CITALOPRAM HYDROBROMIDE 10 MG: 20 TABLET ORAL at 09:03

## 2025-04-27 RX ADMIN — ACETAMINOPHEN 650 MG: 325 TABLET ORAL at 19:31

## 2025-04-27 ASSESSMENT — PAIN DESCRIPTION - PAIN TYPE: TYPE: ACUTE PAIN

## 2025-04-27 ASSESSMENT — PAIN SCALES - GENERAL
PAINLEVEL_OUTOF10: 0
PAINLEVEL_OUTOF10: 2

## 2025-04-27 ASSESSMENT — PAIN DESCRIPTION - LOCATION: LOCATION: HEAD

## 2025-04-27 ASSESSMENT — PAIN DESCRIPTION - DESCRIPTORS: DESCRIPTORS: ACHING

## 2025-04-27 NOTE — H&P
[facility]  Sedation/Analgesia History & Physical    Pt Name: Yeny Fine  MRN: [id number]  YOB: 1950  Provider Performing Procedure: Noah Nolen MD, MD  Primary Care Physician: Tracy Jon APRN - CNP    Formulation and discussion of sedation / procedure plans, risks, benefits, side effects and alternatives with patient and/or responsible adult completed.    PRE-PROCEDURE   DNR-CCA/DNR-CC []Yes [x]No  Brief History/Pre-Procedure Diagnosis: Extensive DVT, RLE swelling and pain          MEDICAL HISTORY  []CAD/Valve  []Liver Disease  []Lung Disease []Diabetes  []Hypertension []Renal Disease  [x]Additional information:       has a past medical history of Arthritis, Asthma, Depression, Fibromyalgia, GERD (gastroesophageal reflux disease), Hx of blood clots, Hyperlipidemia, Hypertension, Hypothyroidism, Restless legs syndrome, Type II or unspecified type diabetes mellitus without mention of complication, not stated as uncontrolled, and Unspecified sleep apnea.    SURGICAL HISTORY   has a past surgical history that includes sinus surgery (1994); Tonsillectomy (1955); Hysterectomy (1992); Knee arthroscopy (Right, 2003); Vena Cava Filter Placement (2003); Cardiac catheterization (2006??); Colonoscopy; joint replacement (Right, 2013); eye surgery (feb. 2016); skin biopsy; Upper gastrointestinal endoscopy (07/25/2017); Colonoscopy (08/11/2017); pr bx of breast,vacuum asst,image guide (Right, 09/26/2013); Breast lumpectomy (Right, 10/17/2013); Cystoscopy (N/A, 2/26/2024); and Ureter surgery (Left, 3/15/2024).  Additional information:       ALLERGIES   Allergies as of 04/26/2025    (No Known Allergies)     Additional information:       MEDICATIONS   Coumadin Use Last 5 Days [x]No []Yes  Antiplatelet drug therapy use last 5 days  [x]No []Yes  Other anticoagulant use last 5 days  [x]No []Yes    Current Facility-Administered Medications:     heparin (porcine) injection 6,500 Units, 80

## 2025-04-27 NOTE — OP NOTE
Department of Radiology  Post Procedure Progress Note      Pre-Procedure Diagnosis:  RLE swelling, extensive DVT    Procedure Performed:  RLE thrombectomy    Anesthesia: local / versed and fentanyl    Findings: successful    Immediate Complications:  None    Estimated Blood Loss: minimal        SEE DICTATED PROCEDURE NOTE FOR COMPLETE DETAILS.    Electronically signed by Noah Nolen MD on 4/27/2025 at 12:42 PM

## 2025-04-27 NOTE — PROGRESS NOTES
Hospitalist Progress Note      Patient:  Yeny Fine 74 y.o. female     Unit/Bed:8B25/025-A    Date of Admission: 4/26/2025      ASSESSMENT AND PLAN    Active Problems  Extensive right-sided DVT, status post thrombectomy today by IR team , SPOKE WITH DR. Nolen.  And spoke with family extensively post  -needs to be on long-term anticoagulation, based on family's input today as outlined below she had previous DVTs which she declined yesterday repeatedly.  CTA done reveals small right pulmonary emboli-patient continued on heparin from admission and post thrombectomy -tolerated procedure well and had extensive clots resected as outlined in IR note  Patient with history of IVC filter and previous DVT per family -patient repeatedly denied this yesterday in the emergency room and have seen her.  Family claims she forgets things easily.  Morbid obesity  YAS uses CPAP on a regular basis  Essential hypertension        IV heparin continues with 2D echo has been ordered need to closely follow-up on that,  Will need long-term anticoagulation consider changing to apixaban based on the coverage.  Will consult pharmacy so they can look into the various options Coumadin versus DOAC        Level of care: []Step Down / []Med-Surg  Bed Status: [x]Inpatient / []Observation  Telemetry: [x]Yes / []No  PT/OT: []Yes / []No    DVT Prophylaxis: [] Lovenox / [x] Heparin / [] SCDs / [x] Already on Systemic Anticoagulation / [] None   Code status: Full Code     Expected discharge date: TBD  Disposition: Possibly home based on a course    ===================================================================    Chief Complaint: Right leg swelling  Subjective (past 24 hours):     Patient was seen this morning pre and post thrombectomy, tolerated the IV heparin overnight without difficulty no chest pain or significant shortness of breath..  Patient denied any bleeding issues no fever or chills overnight.      [facility]

## 2025-04-27 NOTE — PROGRESS NOTES
1100 Patient received in IR for DVT thrombectomy procedure with family taken to consult room.  1108 This procedure has been fully reviewed with the patient and written informed consent has been obtained. Dr. Nolen in; spoke to patient and assessment obtained.  1125 Patient prepped for procedure.  1132 Procedure started with Dr. Nolen.  1135 Access to right popliteal vein with use of sonosite.   1148 Mechanical thrombectomy of right leg with Triever 16.  1155 Upsized to a Triever 20 for additional thrombectomy of the right leg.    1235 Procedure completed; patient tolerated well. Right popliteal vein sheath removed. Slip knot device deployed along with manual pressure.  1245 Manual pressure discontinued. Gauze and op site to right popliteal site covering slip knot; area soft to touch with no bleeding noted. Family is no longer present in the waiting room.   1250 Patient on bed; comfort ensured. Right popliteal dressing remains dry and intact with area soft.    1253 Report called to 8B. Patient taken to 8B via bed. Right popliteal dressing remains dry and intact with area soft. Slip knot to remain in place until IR staff removes in 12-24 hours. Pt to remain on strict bedrest until slip knot is removed by IR staff. Pt alert and oriented x3; follows commands. Skin pink, warm, and dry. Respirations easy, regular, and nonlabored.

## 2025-04-28 ENCOUNTER — APPOINTMENT (OUTPATIENT)
Age: 75
DRG: 270 | End: 2025-04-28
Attending: INTERNAL MEDICINE
Payer: MEDICARE

## 2025-04-28 LAB
ANION GAP SERPL CALC-SCNC: 8 MEQ/L (ref 8–16)
BASOPHILS ABSOLUTE: 0 THOU/MM3 (ref 0–0.1)
BASOPHILS NFR BLD AUTO: 0.5 %
BUN SERPL-MCNC: 21 MG/DL (ref 8–23)
CALCIUM SERPL-MCNC: 8.9 MG/DL (ref 8.8–10.2)
CHLORIDE SERPL-SCNC: 104 MEQ/L (ref 98–111)
CO2 SERPL-SCNC: 26 MEQ/L (ref 22–29)
CREAT SERPL-MCNC: 0.9 MG/DL (ref 0.5–0.9)
DEPRECATED RDW RBC AUTO: 50.4 FL (ref 35–45)
ECHO AV CUSP MM: 1.4 CM
ECHO AV PEAK GRADIENT: 6 MMHG
ECHO AV PEAK VELOCITY: 1.2 M/S
ECHO AV VELOCITY RATIO: 0.83
ECHO BSA: 1.89 M2
ECHO EST RA PRESSURE: 3 MMHG
ECHO LA AREA 4C: 13.7 CM2
ECHO LA DIAMETER INDEX: 1.63 CM/M2
ECHO LA DIAMETER: 3 CM
ECHO LA MAJOR AXIS: 4.6 CM
ECHO LA VOL MOD A4C: 32 ML (ref 22–52)
ECHO LA VOLUME INDEX MOD A4C: 17 ML/M2 (ref 16–34)
ECHO LV E' LATERAL VELOCITY: 7.7 CM/S
ECHO LV E' SEPTAL VELOCITY: 5.8 CM/S
ECHO LV EF PHYSICIAN: 55 %
ECHO LV FRACTIONAL SHORTENING: 27 % (ref 28–44)
ECHO LV INTERNAL DIMENSION DIASTOLE INDEX: 2.23 CM/M2
ECHO LV INTERNAL DIMENSION DIASTOLIC: 4.1 CM (ref 3.9–5.3)
ECHO LV INTERNAL DIMENSION SYSTOLIC INDEX: 1.63 CM/M2
ECHO LV INTERNAL DIMENSION SYSTOLIC: 3 CM
ECHO LV IVSD: 0.9 CM (ref 0.6–0.9)
ECHO LV MASS 2D: 114.1 G (ref 67–162)
ECHO LV MASS INDEX 2D: 62 G/M2 (ref 43–95)
ECHO LV POSTERIOR WALL DIASTOLIC: 0.9 CM (ref 0.6–0.9)
ECHO LV RELATIVE WALL THICKNESS RATIO: 0.44
ECHO LVOT PEAK GRADIENT: 4 MMHG
ECHO LVOT PEAK VELOCITY: 1 M/S
ECHO MV A VELOCITY: 0.61 M/S
ECHO MV E DECELERATION TIME (DT): 218 MS
ECHO MV E VELOCITY: 0.55 M/S
ECHO MV E/A RATIO: 0.9
ECHO MV E/E' LATERAL: 7.14
ECHO MV E/E' RATIO (AVERAGED): 8.31
ECHO MV E/E' SEPTAL: 9.48
ECHO PV MAX VELOCITY: 0.9 M/S
ECHO PV PEAK GRADIENT: 3 MMHG
ECHO RIGHT VENTRICULAR SYSTOLIC PRESSURE (RVSP): 29 MMHG
ECHO RV GLOBAL SYSTOLIC STRAIN (GLS): -28.3 %
ECHO RV INTERNAL DIMENSION: 2.9 CM
ECHO RV TAPSE: 2.3 CM (ref 1.7–?)
ECHO TV E WAVE: 0.5 M/S
ECHO TV REGURGITANT MAX VELOCITY: 2.57 M/S
ECHO TV REGURGITANT PEAK GRADIENT: 26 MMHG
EOSINOPHIL NFR BLD AUTO: 1.8 %
EOSINOPHILS ABSOLUTE: 0.1 THOU/MM3 (ref 0–0.4)
ERYTHROCYTE [DISTWIDTH] IN BLOOD BY AUTOMATED COUNT: 15 % (ref 11.5–14.5)
GFR SERPL CREATININE-BSD FRML MDRD: 66 ML/MIN/1.73M2
GLUCOSE BLD STRIP.AUTO-MCNC: 226 MG/DL (ref 70–108)
GLUCOSE SERPL-MCNC: 221 MG/DL (ref 74–109)
HCT VFR BLD AUTO: 28.5 % (ref 37–47)
HEPARIN UNFRACTIONATED: 0.43 U/ML (ref 0.3–0.7)
HGB BLD-MCNC: 8.8 GM/DL (ref 12–16)
IMM GRANULOCYTES # BLD AUTO: 0.12 THOU/MM3 (ref 0–0.07)
IMM GRANULOCYTES NFR BLD AUTO: 1.6 %
LYMPHOCYTES ABSOLUTE: 1.9 THOU/MM3 (ref 1–4.8)
LYMPHOCYTES NFR BLD AUTO: 24.8 %
MCH RBC QN AUTO: 28.6 PG (ref 26–33)
MCHC RBC AUTO-ENTMCNC: 30.9 GM/DL (ref 32.2–35.5)
MCV RBC AUTO: 92.5 FL (ref 81–99)
MONOCYTES ABSOLUTE: 0.8 THOU/MM3 (ref 0.4–1.3)
MONOCYTES NFR BLD AUTO: 10 %
NEUTROPHILS ABSOLUTE: 4.7 THOU/MM3 (ref 1.8–7.7)
NEUTROPHILS NFR BLD AUTO: 61.3 %
NRBC BLD AUTO-RTO: 0 /100 WBC
PLATELET # BLD AUTO: 176 THOU/MM3 (ref 130–400)
PMV BLD AUTO: 10.2 FL (ref 9.4–12.4)
POTASSIUM SERPL-SCNC: 4.9 MEQ/L (ref 3.5–5.2)
RBC # BLD AUTO: 3.08 MILL/MM3 (ref 4.2–5.4)
SODIUM SERPL-SCNC: 138 MEQ/L (ref 135–145)
WBC # BLD AUTO: 7.6 THOU/MM3 (ref 4.8–10.8)

## 2025-04-28 PROCEDURE — 6370000000 HC RX 637 (ALT 250 FOR IP): Performed by: NURSE PRACTITIONER

## 2025-04-28 PROCEDURE — 6370000000 HC RX 637 (ALT 250 FOR IP): Performed by: INTERNAL MEDICINE

## 2025-04-28 PROCEDURE — 36415 COLL VENOUS BLD VENIPUNCTURE: CPT

## 2025-04-28 PROCEDURE — 93306 TTE W/DOPPLER COMPLETE: CPT

## 2025-04-28 PROCEDURE — 85025 COMPLETE CBC W/AUTO DIFF WBC: CPT

## 2025-04-28 PROCEDURE — 6360000002 HC RX W HCPCS: Performed by: INTERNAL MEDICINE

## 2025-04-28 PROCEDURE — 82948 REAGENT STRIP/BLOOD GLUCOSE: CPT

## 2025-04-28 PROCEDURE — 1200000003 HC TELEMETRY R&B

## 2025-04-28 PROCEDURE — 99233 SBSQ HOSP IP/OBS HIGH 50: CPT | Performed by: NURSE PRACTITIONER

## 2025-04-28 PROCEDURE — 93306 TTE W/DOPPLER COMPLETE: CPT | Performed by: INTERNAL MEDICINE

## 2025-04-28 PROCEDURE — 93356 MYOCRD STRAIN IMG SPCKL TRCK: CPT | Performed by: INTERNAL MEDICINE

## 2025-04-28 PROCEDURE — 85520 HEPARIN ASSAY: CPT

## 2025-04-28 PROCEDURE — 80048 BASIC METABOLIC PNL TOTAL CA: CPT

## 2025-04-28 RX ADMIN — METOPROLOL TARTRATE 25 MG: 50 TABLET, FILM COATED ORAL at 08:05

## 2025-04-28 RX ADMIN — LOSARTAN POTASSIUM 50 MG: 50 TABLET, FILM COATED ORAL at 08:04

## 2025-04-28 RX ADMIN — ATORVASTATIN CALCIUM 10 MG: 10 TABLET, FILM COATED ORAL at 08:05

## 2025-04-28 RX ADMIN — PANTOPRAZOLE SODIUM 40 MG: 40 TABLET, DELAYED RELEASE ORAL at 08:04

## 2025-04-28 RX ADMIN — APIXABAN 10 MG: 5 TABLET, FILM COATED ORAL at 13:42

## 2025-04-28 RX ADMIN — LEVOTHYROXINE SODIUM 25 MCG: 0.03 TABLET ORAL at 06:19

## 2025-04-28 RX ADMIN — APIXABAN 10 MG: 5 TABLET, FILM COATED ORAL at 20:27

## 2025-04-28 RX ADMIN — HEPARIN SODIUM 11 UNITS/KG/HR: 10000 INJECTION, SOLUTION INTRAVENOUS at 11:52

## 2025-04-28 RX ADMIN — ASPIRIN 81 MG: 81 TABLET, CHEWABLE ORAL at 08:04

## 2025-04-28 RX ADMIN — CITALOPRAM HYDROBROMIDE 10 MG: 20 TABLET ORAL at 08:05

## 2025-04-28 RX ADMIN — METOPROLOL TARTRATE 25 MG: 50 TABLET, FILM COATED ORAL at 20:27

## 2025-04-28 ASSESSMENT — PAIN SCALES - GENERAL: PAINLEVEL_OUTOF10: 0

## 2025-04-28 NOTE — PLAN OF CARE
Problem: Chronic Conditions and Co-morbidities  Goal: Patient's chronic conditions and co-morbidity symptoms are monitored and maintained or improved  4/27/2025 2127 by Donna Maher RN  Outcome: Progressing  4/27/2025 0925 by Julita Valdez RN  Outcome: Progressing     Problem: Discharge Planning  Goal: Discharge to home or other facility with appropriate resources  4/27/2025 2127 by Donna Maher RN  Outcome: Progressing  4/27/2025 0925 by Julita Valdez RN  Outcome: Progressing     Problem: Pain  Goal: Verbalizes/displays adequate comfort level or baseline comfort level  4/27/2025 2127 by Donna Maher RN  Outcome: Progressing  4/27/2025 0925 by Julita Valdez RN  Outcome: Progressing     Problem: Safety - Adult  Goal: Free from fall injury  4/27/2025 2127 by Donna Maher RN  Outcome: Progressing  4/27/2025 0925 by Julita Valdez RN  Outcome: Progressing     Problem: Neurosensory - Adult  Goal: Achieves stable or improved neurological status  Outcome: Progressing  Goal: Achieves maximal functionality and self care  Outcome: Progressing     Problem: Respiratory - Adult  Goal: Achieves optimal ventilation and oxygenation  Outcome: Progressing     Problem: Cardiovascular - Adult  Goal: Maintains optimal cardiac output and hemodynamic stability  Outcome: Progressing  Goal: Absence of cardiac dysrhythmias or at baseline  Outcome: Progressing     Problem: Skin/Tissue Integrity - Adult  Goal: Skin integrity remains intact  Outcome: Progressing  Goal: Incisions, wounds, or drain sites healing without S/S of infection  Outcome: Progressing     Problem: Musculoskeletal - Adult  Goal: Return mobility to safest level of function  Outcome: Progressing  Goal: Maintain proper alignment of affected body part  Outcome: Progressing  Goal: Return ADL status to a safe level of function  Outcome: Progressing     Problem: Gastrointestinal - Adult  Goal: Minimal or absence of nausea and vomiting  Outcome:

## 2025-04-28 NOTE — CARE COORDINATION
Case Management Assessment Initial Evaluation    Date/Time of Evaluation: 2025 8:24 AM  Assessment Completed by: Brooklyn Bernal RN    If patient is discharged prior to next notation, then this note serves as note for discharge by case management.    Patient Name: Yeny Fine                   YOB: 1950  Diagnosis: Obesity (BMI 30-39.9) [E66.9]  Acute deep vein thrombosis (DVT) of femoral vein of right lower extremity (HCC) [I82.411]  Acute deep vein thrombosis (DVT) of proximal vein of right lower extremity (HCC) [I82.4Y1]                   Date / Time: 2025 12:14 PM  [facility]     Patient Admission Status: Inpatient   Readmission Risk Low 0-14, Mod 15-19), High > 20: Readmission Risk Score: 13.9    Current PCP: Tracy Jon APRN - CNP  Health Care Decision Makers:     Additional Case Management Notes: Patient presented with RLE pain. Found to have right DVT, and and PE. Successful thrombectomy yesterday. Heparin gtt. Bedrest with plans for slipknot removal and mobility later today. OAC tomorrow then anticipate dc.     Procedures:    IR Thrombectomy: Extensive right lower extremity DVT extending into the IVC which was successfully treated with mechanical thromboaspiration with an excellent result.    Imagin/26 RLE Doppler: Extensive acute DVT throughout the right lower extremity.   CTA chest:  Small right lower lobe subsegmental pulmonary embolus.     Patient Goals/Plan/Treatment Preferences: Yeny plans to return home alone w family support. Her significant other recently went to LTC. She has a cane and drives. Denies needs.        25 1025   Service Assessment   Patient Orientation Alert and Oriented   Cognition Alert   History Provided By Patient   Primary Caregiver Self   Accompanied By/Relationship n/a   Support Systems Family Members;Friends/Neighbors   Patient's Healthcare Decision Maker is: Legal Next of Kin   PCP Verified by CM Yes   Last Visit to

## 2025-04-28 NOTE — PROGRESS NOTES
Price comp Eliquis v Xarelto v Warfarin:    Yeny has a $242.20 deductible to meet.    Eliquis 5mg #60: $289.20 initially, then $47/mo. (Free 30 day trial eligible)  Warfarin (5mg #60): $0  Xarelto 20mg #30: $289.20 initislly, then $47/mo (Free 30 day free trial eligible)    Angi Pratt CPhT  Patient  Ext [phone]  Outpatient Pharmacy  4/28/2025,8:12 AM

## 2025-04-28 NOTE — PROGRESS NOTES
Hospitalist Progress Note      Patient:  Yeny Fine 74 y.o. female     Unit/Bed:8B-25/025-A    Date of Admission: 4/26/2025      ASSESSMENT AND PLAN    Active Problems    Extensive right-sided DVT, status post thrombectomy by IR team 4/27. Currently on IV heparin. Trending aPPTs. Discussed cost of DOAC. She is agreeable tor Eliquis. This has been ordered. Has history of previous DVTs and IVC filter placement.   Small right pulmonary emboli noted on CTA. Echo pending. No hypoxia.   Morbid obesity. BMI 31.53.  YAS uses CPAP on a regular basis  Essential hypertension  Physical deconditioning. Add PT/OT. Agreeable to SNF placement if needed. Will consult SW.  Obesity. BMI 31.53.        Level of care: []Step Down / [x]Med-Surg  Bed Status: [x]Inpatient / []Observation  Telemetry: [x]Yes / []No  PT/OT: [x]Yes / []No    DVT Prophylaxis: [] Lovenox / [] Heparin / [] SCDs / [x] Already on Systemic Anticoagulation / [] None   Code status: Full Code     Expected discharge date: TBD  Disposition: Possibly home based on a course    ===================================================================    Chief Complaint: Right leg swelling  Subjective (past 24 hours):     Reports pain in her right leg is better. No SOB. Has not been up. She reports being weak.      HPI / Hospital Course:    Pleasant obese white female came to the emergency room for having right leg pain for about 2 weeks mild degree of swelling.  Patient claims she was in the ER before about 10 days ago at that time she had UTI and hematuria was a problem claims she was taking antibiotics which she is not sure if she completed them.  No actual dysuria but her family physician informed her recently she \" probably still has UTI \".     Patient denies any extensive traveling recently no recent bleeding issues such as hematemesis hemoptysis.  And she also denies any active GI problem with ulcers etc.  She also denies repeatedly of being on warfarin or any

## 2025-04-28 NOTE — PROGRESS NOTES
Patient received sacramental anointing by a . If you are in need of  support, please call [phone]. If you are in need of a  after 6:30pm, please call the house supervisor for the on-call .      Geneva Layton  [facility] Health Coordinator  [phone]

## 2025-04-28 NOTE — PLAN OF CARE
Problem: Chronic Conditions and Co-morbidities  Goal: Patient's chronic conditions and co-morbidity symptoms are monitored and maintained or improved  4/28/2025 0855 by Julita Valdez RN  Outcome: Progressing  4/27/2025 2127 by Donna Maher RN  Outcome: Progressing     Problem: Discharge Planning  Goal: Discharge to home or other facility with appropriate resources  4/28/2025 0855 by Julita Valdez RN  Outcome: Progressing  4/27/2025 2127 by Donna Maher RN  Outcome: Progressing     Problem: Pain  Goal: Verbalizes/displays adequate comfort level or baseline comfort level  4/28/2025 0855 by Julita Valdez RN  Outcome: Progressing  4/27/2025 2127 by Donna Maher RN  Outcome: Progressing     Problem: Safety - Adult  Goal: Free from fall injury  4/28/2025 0855 by Julita Valdez RN  Outcome: Progressing  4/27/2025 2127 by Donna Maher RN  Outcome: Progressing     Problem: Neurosensory - Adult  Goal: Achieves stable or improved neurological status  4/28/2025 0855 by Julita Valdez RN  Outcome: Progressing  4/27/2025 2127 by Donna Maher RN  Outcome: Progressing  Goal: Achieves maximal functionality and self care  4/28/2025 0855 by Julita Valdez RN  Outcome: Progressing  4/27/2025 2127 by Donna Maher RN  Outcome: Progressing     Problem: Respiratory - Adult  Goal: Achieves optimal ventilation and oxygenation  4/28/2025 0855 by Julita Valdez RN  Outcome: Progressing  4/27/2025 2127 by Donna Maher RN  Outcome: Progressing     Problem: Cardiovascular - Adult  Goal: Maintains optimal cardiac output and hemodynamic stability  4/28/2025 0855 by Julita Valdez RN  Outcome: Progressing  4/27/2025 2127 by Donna Maher RN  Outcome: Progressing  Goal: Absence of cardiac dysrhythmias or at baseline  4/28/2025 0855 by Julita Valdez RN  Outcome: Progressing  4/27/2025 2127 by Zourdos, Donna, RN  Outcome: Progressing     Problem: Skin/Tissue Integrity -

## 2025-04-29 VITALS
WEIGHT: 178 LBS | OXYGEN SATURATION: 99 % | SYSTOLIC BLOOD PRESSURE: 123 MMHG | RESPIRATION RATE: 17 BRPM | DIASTOLIC BLOOD PRESSURE: 52 MMHG | HEIGHT: 63 IN | HEART RATE: 64 BPM | BODY MASS INDEX: 31.54 KG/M2 | TEMPERATURE: 98 F

## 2025-04-29 PROBLEM — R53.81 PHYSICAL DECONDITIONING: Status: ACTIVE | Noted: 2025-04-29

## 2025-04-29 LAB
ANION GAP SERPL CALC-SCNC: 7 MEQ/L (ref 8–16)
BASOPHILS ABSOLUTE: 0 THOU/MM3 (ref 0–0.1)
BASOPHILS NFR BLD AUTO: 0.4 %
BUN SERPL-MCNC: 19 MG/DL (ref 8–23)
CALCIUM SERPL-MCNC: 9.4 MG/DL (ref 8.8–10.2)
CHLORIDE SERPL-SCNC: 105 MEQ/L (ref 98–111)
CO2 SERPL-SCNC: 29 MEQ/L (ref 22–29)
CREAT SERPL-MCNC: 1 MG/DL (ref 0.5–0.9)
DEPRECATED RDW RBC AUTO: 50.2 FL (ref 35–45)
EOSINOPHIL NFR BLD AUTO: 1.4 %
EOSINOPHILS ABSOLUTE: 0.1 THOU/MM3 (ref 0–0.4)
ERYTHROCYTE [DISTWIDTH] IN BLOOD BY AUTOMATED COUNT: 15.1 % (ref 11.5–14.5)
GFR SERPL CREATININE-BSD FRML MDRD: 58 ML/MIN/1.73M2
GLUCOSE SERPL-MCNC: 176 MG/DL (ref 74–109)
HCT VFR BLD AUTO: 27.2 % (ref 37–47)
HGB BLD-MCNC: 8.5 GM/DL (ref 12–16)
IMM GRANULOCYTES # BLD AUTO: 0.16 THOU/MM3 (ref 0–0.07)
IMM GRANULOCYTES NFR BLD AUTO: 2.3 %
LYMPHOCYTES ABSOLUTE: 1.7 THOU/MM3 (ref 1–4.8)
LYMPHOCYTES NFR BLD AUTO: 24.6 %
MCH RBC QN AUTO: 28.7 PG (ref 26–33)
MCHC RBC AUTO-ENTMCNC: 31.3 GM/DL (ref 32.2–35.5)
MCV RBC AUTO: 91.9 FL (ref 81–99)
MONOCYTES ABSOLUTE: 0.7 THOU/MM3 (ref 0.4–1.3)
MONOCYTES NFR BLD AUTO: 9.4 %
NEUTROPHILS ABSOLUTE: 4.3 THOU/MM3 (ref 1.8–7.7)
NEUTROPHILS NFR BLD AUTO: 61.9 %
NRBC BLD AUTO-RTO: 0 /100 WBC
PLATELET # BLD AUTO: 202 THOU/MM3 (ref 130–400)
PMV BLD AUTO: 10.5 FL (ref 9.4–12.4)
POTASSIUM SERPL-SCNC: 5.2 MEQ/L (ref 3.5–5.2)
RBC # BLD AUTO: 2.96 MILL/MM3 (ref 4.2–5.4)
SODIUM SERPL-SCNC: 141 MEQ/L (ref 135–145)
WBC # BLD AUTO: 7 THOU/MM3 (ref 4.8–10.8)

## 2025-04-29 PROCEDURE — 80048 BASIC METABOLIC PNL TOTAL CA: CPT

## 2025-04-29 PROCEDURE — 36415 COLL VENOUS BLD VENIPUNCTURE: CPT

## 2025-04-29 PROCEDURE — 99239 HOSP IP/OBS DSCHRG MGMT >30: CPT | Performed by: NURSE PRACTITIONER

## 2025-04-29 PROCEDURE — 6370000000 HC RX 637 (ALT 250 FOR IP): Performed by: INTERNAL MEDICINE

## 2025-04-29 PROCEDURE — 97166 OT EVAL MOD COMPLEX 45 MIN: CPT

## 2025-04-29 PROCEDURE — 97535 SELF CARE MNGMENT TRAINING: CPT

## 2025-04-29 PROCEDURE — 97530 THERAPEUTIC ACTIVITIES: CPT

## 2025-04-29 PROCEDURE — 97161 PT EVAL LOW COMPLEX 20 MIN: CPT

## 2025-04-29 PROCEDURE — 6370000000 HC RX 637 (ALT 250 FOR IP): Performed by: NURSE PRACTITIONER

## 2025-04-29 PROCEDURE — 85025 COMPLETE CBC W/AUTO DIFF WBC: CPT

## 2025-04-29 RX ADMIN — ATORVASTATIN CALCIUM 10 MG: 10 TABLET, FILM COATED ORAL at 08:23

## 2025-04-29 RX ADMIN — CITALOPRAM HYDROBROMIDE 10 MG: 20 TABLET ORAL at 08:23

## 2025-04-29 RX ADMIN — LOSARTAN POTASSIUM 50 MG: 50 TABLET, FILM COATED ORAL at 08:23

## 2025-04-29 RX ADMIN — APIXABAN 10 MG: 5 TABLET, FILM COATED ORAL at 08:23

## 2025-04-29 RX ADMIN — LEVOTHYROXINE SODIUM 25 MCG: 0.03 TABLET ORAL at 06:13

## 2025-04-29 RX ADMIN — ASPIRIN 81 MG: 81 TABLET, CHEWABLE ORAL at 08:23

## 2025-04-29 RX ADMIN — PANTOPRAZOLE SODIUM 40 MG: 40 TABLET, DELAYED RELEASE ORAL at 08:23

## 2025-04-29 NOTE — PROGRESS NOTES
[facility]  INPATIENT PHYSICAL THERAPY  EVALUATION  [facility] MED SURG 8AB - 8B-25/025-A    Discharge Recommendations: Home with Home health PT  Equipment Recommendations: No               Time In: 1119  Time Out: 1144  Timed Code Treatment Minutes: 17 Minutes  Minutes: 25          Date: 2025  Patient Name: Yeny Fine,  Gender:  female        MRN: [id number]  : 1950  (74 y.o.)      Referring Practitioner: Ira Puente APRN - CNP  Diagnosis: Acute deep vein thrombosis (DVT) of femoral vein of right lower extremity (HCC)  Additional Pertinent Hx: Per EMR: \"Pleasant obese white female came to the emergency room for having right leg pain for about 2 weeks mild degree of swelling.  Patient claims she was in the ER before about 10 days ago at that time she had UTI and hematuria was a problem claims she was taking antibiotics which she is not sure if she completed them.  No actual dysuria but her family physician informed her recently she \" probably still has UTI \"\"     Restrictions/Precautions:  Restrictions/Precautions: General Precautions       Other Position/Activity Restrictions: hx DVT in RLE s/p thrombectomy    Required Braces or Orthoses?: No      Subjective:  Chart Reviewed: Yes  Patient assessed for rehabilitation services?: Yes  Family/Caregiver Present: No  Subjective: Clearance from RN to see pt this date. Pt was supine in bed when PT arrived. Pt agreeable to PT session.    General:  Overall Orientation Status: Within Normal Limits  Orientation Level: Oriented X4  Overall Cognitive Status: WNL  Vision: Impaired  Vision Exceptions: Wears glasses at all times  Hearing: Exceptions to WFL  Hearing Exceptions: Hard of hearing/hearing concerns       Pain: -/10: Pt reports some discomfort in the posterior R knee with ambulation but states that it is tolerable.     Vitals: Vitals not assessed per clinical judgement, see nursing flowsheet    Social/Functional History:    Lives With:

## 2025-04-29 NOTE — PROGRESS NOTES
[facility]  INPATIENT OCCUPATIONAL THERAPY  [facility] MED SURG 8AB  EVALUATION      Discharge Recommendations: Home with Home health OT  Equipment Recommendations: No continue to monitor      Time In: 0648  Time Out: 07  Timed Code Treatment Minutes: 9 Minutes  Minutes: 19          Date: 2025  Patient Name: Yeny Fine,   Gender: female      MRN: [id number]  : 1950  (74 y.o.)  Referring Practitioner: Ira Puente APRN - CNP  Diagnosis: Acute deep vein thrombosis (DVT) of femoral vein of right lower extremity (HCC)  Additional Pertinent Hx: Pleasant obese white female came to the emergency room for having right leg pain for about 2 weeks mild degree of swelling.  Patient claims she was in the ER before about 10 days ago at that time she had UTI and hematuria was a problem claims she was taking antibiotics which she is not sure if she completed them.  No actual dysuria but her family physician informed her recently she \" probably still has UTI \"    Restrictions/Precautions:  Restrictions/Precautions: Fall Risk  Position Activity Restriction  Other Position/Activity Restrictions: hx DVT in RLE s/p thrombectomy    Subjective  Chart Reviewed: Yes, Orders, Progress Notes, History and Physical  Patient assessed for rehabilitation services?: Yes  Family / Caregiver Present: No    Subjective: RN approved OT session, patient pleasant and agreeable to session. no bed alarm on and patient up in room walking independently. patient in recliner at end of session with chair alarm on.    Pain: 3-4/10: right leg pain     Vitals: Nurse checked vitals prior to session    Social/Functional History:  Lives With: Alone  Type of Home: House  Home Layout: One level, Laundry in basement  Home Access: Stairs to enter with rails  Entrance Stairs - Number of Steps: 3  Entrance Stairs - Rails: Both  Home Equipment: Cane, None   Bathroom Shower/Tub: Walk-in shower  Bathroom Toilet: Standard  Bathroom

## 2025-04-29 NOTE — PROGRESS NOTES
Received call from lab, pt has abnormal anti xa result. Lab trying to confirm why this could be resulting as a critical high value. Explained to lab, pt had been transitioned to eliquis and no longer on heparin drip. Per lab, this causes abnormal high level. Secure chat sent to Cindi LARKIN requesting to dc anti xa lab draws and updating on results. Awaiting response    Per NP, okay to dc xa orders.

## 2025-04-29 NOTE — PLAN OF CARE
Problem: Safety - Adult  Goal: Free from fall injury  4/28/2025 2228 by Sanjuana Davidson, RN  Outcome: Progressing  4/28/2025 0855 by Julita Valdez, RN  Outcome: Progressing

## 2025-04-29 NOTE — DISCHARGE SUMMARY
Hospital Medicine Discharge Summary      Patient Identification:   Yeny Fine   : 1950  MRN: [id number]   Account: [id number]      Patient's PCP: Tracy Jon APRN - CNP    Admit Date: 2025     Discharge Date:   2025    Admitting Physician: Ramon Blum MD      Discharge Physician: ANA Chaves CNP     Discharge Diagnoses and Hospital Course:    Presented to the ED right leg edema.     Extensive right-sided DVT, status post thrombectomy by IR team . IV Heparin was transitioned to DOAC . Discussed cost of DOAC. She is agreeable tor Eliquis. Rx given. Has history of previous DVTs and IVC filter placement. She will need OAC indefinitely unless risk start to outweigh the benefits..   Small right pulmonary emboli noted on CTA. Echo with no right heart strain, preserved EF. No hypoxia.   Moderate tricuspid regurgitation.   Obesity. BMI 31.53.  YAS uses CPAP on a regular basis  Essential hypertension  Physical deconditioning. PT/OT seen.       She has been optimized. Home today with PCP and Home health. OP referral to hematology for recurrent DVT given.       The patient was seen and examined on day of discharge and this discharge summary is in conjunction with any daily progress note from day of discharge.        Exam:     Vitals:  Vitals:    25 0413 25 0815 25 1208   BP: 130/83 129/62 135/61 117/61   Pulse: 65 57 54 70   Resp: 16  16 17   Temp: 98.4 °F (36.9 °C) 98.1 °F (36.7 °C) 98.2 °F (36.8 °C) 97.9 °F (36.6 °C)   TempSrc: Oral Oral Oral Oral   SpO2: 98% 98% 98%    Weight:       Height:         Weight: Weight - Scale: 80.7 kg (178 lb)     24 hour intake/output:  Intake/Output Summary (Last 24 hours) at 2025 1447  Last data filed at 2025 1534  Gross per 24 hour   Intake 180 ml   Output --   Net 180 ml       Labs: For convenience and continuity at follow-up the following most recent labs are provided:      CBC:    Lab

## 2025-04-29 NOTE — PROGRESS NOTES
Hospitalist Progress Note      Patient:  Yeny Fine 74 y.o. female     Unit/Bed:8B-25/025-A    Date of Admission: 4/26/2025      ASSESSMENT AND PLAN    Active Problems    Extensive right-sided DVT, status post thrombectomy by IR team 4/27. IV Heparin was transitioned to DOAC 4/28. Discussed cost of DOAC. She is agreeable tor Citlaly. Has history of previous DVTs and IVC filter placement.   Small right pulmonary emboli noted on CTA. Echo with no right heart strain, preserved EF. No hypoxia.   Moderate tricuspid regurgitation.   Obesity. BMI 31.53.  YAS uses CPAP on a regular basis  Essential hypertension  Physical deconditioning. PT/OT. Agreeable to SNF placement if needed. SW following. Awaiting PT recs. If home she is agreeable to .      Level of care: []Step Down / [x]Med-Surg  Bed Status: [x]Inpatient / []Observation  Telemetry: [x]Yes / []No  PT/OT: [x]Yes / []No    DVT Prophylaxis: [] Lovenox / [] Heparin / [] SCDs / [x] Already on Systemic Anticoagulation / [] None   Code status: Full Code     Expected discharge date: TBD  Disposition: Possibly home based on a course    ===================================================================    Chief Complaint: Right leg swelling  Subjective (past 24 hours):     Hopeful to go home. States she got up to the chair this AM with staff easily. No CP/SOB      HPI / Hospital Course:    Pleasant obese white female came to the emergency room for having right leg pain for about 2 weeks mild degree of swelling.  Patient claims she was in the ER before about 10 days ago at that time she had UTI and hematuria was a problem claims she was taking antibiotics which she is not sure if she completed them.  No actual dysuria but her family physician informed her recently she \" probably still has UTI \".     Patient denies any extensive traveling recently no recent bleeding issues such as hematemesis hemoptysis.  And she also denies any active GI problem with ulcers etc.  She

## 2025-04-29 NOTE — CARE COORDINATION
DISCHARGE PLANNING EVALUATION  4/29/25, 3:35 PM EDT    Reason for Referral: SNF likely  Decision Maker: Patient makes own decisions  Current Services: none  New Services Requested: HH  Family/ Social/ Home environment: Spoke with patient she lives alone.  Patient states she is independent with ADL's and household chores and drives. Patient is agreeable to HH; nursing, PT & OT.  Preference is [facility].  Payment Source:[employer] Medicare  Transportation at Discharge: family  Post-acute (PAC) provider list was provided to patient. Patient was informed of their freedom to choose PAC provider. Discussed and offered to show the patient the relevant PAC Providers quality and resource use measures on Medicare Compare web site via computer based on patient's goals of care and treatment preferences. Questions regarding selection process were answered.      Teach Back Method used with patient regarding care plan and discharge planning.  Patient  verbalized understanding of the plan of care and contribute to goal setting.       Patient preferences and discharge plan: Plan home alone with new .  Referral made to [facility].    Electronically signed by NADEEM Lazaro on 4/29/2025 at 3:35 PM    4/29/25, 3:48 PM EDT    Patient goals/plan/ treatment preferences discussed by  and .  Patient goals/plan/ treatment preferences reviewed with patient/ family.  Patient/ family verbalize understanding of discharge plan and are in agreement with goal/plan/treatment preferences.  Understanding was demonstrated using the teach back method.  AVS provided by RN at time of discharge, which includes all necessary medical information pertaining to the patients current course of illness, treatment, post-discharge goals of care, and treatment preferences.     Services At/After Discharge: Home Health, Nursing service, OT, and PT     Patient discharge home alone with new [facility].

## 2025-05-03 ENCOUNTER — HOSPITAL ENCOUNTER (EMERGENCY)
Age: 75
Discharge: HOME | End: 2025-05-03
Attending: EMERGENCY MEDICINE
Payer: MEDICARE

## 2025-05-03 ENCOUNTER — APPOINTMENT (OUTPATIENT)
Dept: INTERVENTIONAL RADIOLOGY/VASCULAR | Age: 75
End: 2025-05-03
Payer: MEDICARE

## 2025-05-03 ENCOUNTER — APPOINTMENT (OUTPATIENT)
Dept: CT IMAGING | Age: 75
End: 2025-05-03
Payer: MEDICARE

## 2025-05-03 VITALS
BODY MASS INDEX: 31.53 KG/M2 | HEART RATE: 72 BPM | WEIGHT: 178 LBS | DIASTOLIC BLOOD PRESSURE: 76 MMHG | RESPIRATION RATE: 18 BRPM | SYSTOLIC BLOOD PRESSURE: 157 MMHG | OXYGEN SATURATION: 97 % | TEMPERATURE: 97.8 F

## 2025-05-03 DIAGNOSIS — R58 ECCHYMOSIS: Primary | ICD-10-CM

## 2025-05-03 LAB
ANION GAP SERPL CALC-SCNC: 8 MEQ/L (ref 8–16)
APTT PPP: 34 SECONDS (ref 22–38)
BASOPHILS ABSOLUTE: 0 THOU/MM3 (ref 0–0.1)
BASOPHILS NFR BLD AUTO: 0.5 %
BUN SERPL-MCNC: 21 MG/DL (ref 8–23)
CALCIUM SERPL-MCNC: 8.7 MG/DL (ref 8.8–10.2)
CHLORIDE SERPL-SCNC: 102 MEQ/L (ref 98–111)
CO2 SERPL-SCNC: 29 MEQ/L (ref 22–29)
CREAT SERPL-MCNC: 1 MG/DL (ref 0.5–0.9)
DEPRECATED RDW RBC AUTO: 51.1 FL (ref 35–45)
EOSINOPHIL NFR BLD AUTO: 1.2 %
EOSINOPHILS ABSOLUTE: 0.1 THOU/MM3 (ref 0–0.4)
ERYTHROCYTE [DISTWIDTH] IN BLOOD BY AUTOMATED COUNT: 15.6 % (ref 11.5–14.5)
GFR SERPL CREATININE-BSD FRML MDRD: 58 ML/MIN/1.73M2
GLUCOSE SERPL-MCNC: 177 MG/DL (ref 74–109)
HCT VFR BLD AUTO: 27.5 % (ref 37–47)
HGB BLD-MCNC: 8.5 GM/DL (ref 12–16)
IMM GRANULOCYTES # BLD AUTO: 0.07 THOU/MM3 (ref 0–0.07)
IMM GRANULOCYTES NFR BLD AUTO: 0.9 %
INR PPP: 2.19 (ref 0.85–1.13)
LYMPHOCYTES ABSOLUTE: 1.5 THOU/MM3 (ref 1–4.8)
LYMPHOCYTES NFR BLD AUTO: 20.6 %
MCH RBC QN AUTO: 28.3 PG (ref 26–33)
MCHC RBC AUTO-ENTMCNC: 30.9 GM/DL (ref 32.2–35.5)
MCV RBC AUTO: 91.7 FL (ref 81–99)
MONOCYTES ABSOLUTE: 0.6 THOU/MM3 (ref 0.4–1.3)
MONOCYTES NFR BLD AUTO: 8.1 %
NEUTROPHILS ABSOLUTE: 5.2 THOU/MM3 (ref 1.8–7.7)
NEUTROPHILS NFR BLD AUTO: 68.7 %
NRBC BLD AUTO-RTO: 0 /100 WBC
OSMOLALITY SERPL CALC.SUM OF ELEC: 284.9 MOSMOL/KG (ref 275–300)
PLATELET # BLD AUTO: 255 THOU/MM3 (ref 130–400)
PMV BLD AUTO: 9.7 FL (ref 9.4–12.4)
POTASSIUM SERPL-SCNC: 4.2 MEQ/L (ref 3.5–5.2)
RBC # BLD AUTO: 3 MILL/MM3 (ref 4.2–5.4)
SODIUM SERPL-SCNC: 139 MEQ/L (ref 135–145)
WBC # BLD AUTO: 7.5 THOU/MM3 (ref 4.8–10.8)

## 2025-05-03 PROCEDURE — 85610 PROTHROMBIN TIME: CPT

## 2025-05-03 PROCEDURE — 36415 COLL VENOUS BLD VENIPUNCTURE: CPT

## 2025-05-03 PROCEDURE — 75635 CT ANGIO ABDOMINAL ARTERIES: CPT

## 2025-05-03 PROCEDURE — 85025 COMPLETE CBC W/AUTO DIFF WBC: CPT

## 2025-05-03 PROCEDURE — 6360000004 HC RX CONTRAST MEDICATION: Performed by: EMERGENCY MEDICINE

## 2025-05-03 PROCEDURE — 80048 BASIC METABOLIC PNL TOTAL CA: CPT

## 2025-05-03 PROCEDURE — 93971 EXTREMITY STUDY: CPT

## 2025-05-03 PROCEDURE — 85730 THROMBOPLASTIN TIME PARTIAL: CPT

## 2025-05-03 PROCEDURE — 99285 EMERGENCY DEPT VISIT HI MDM: CPT

## 2025-05-03 RX ORDER — IOPAMIDOL 755 MG/ML
80 INJECTION, SOLUTION INTRAVASCULAR
Status: COMPLETED | OUTPATIENT
Start: 2025-05-03 | End: 2025-05-03

## 2025-05-03 RX ADMIN — IOPAMIDOL 80 ML: 755 INJECTION, SOLUTION INTRAVENOUS at 18:27

## 2025-05-03 ASSESSMENT — PAIN DESCRIPTION - DESCRIPTORS: DESCRIPTORS: ACHING;THROBBING

## 2025-05-03 ASSESSMENT — PAIN DESCRIPTION - ONSET: ONSET: GRADUAL

## 2025-05-03 ASSESSMENT — PAIN - FUNCTIONAL ASSESSMENT
PAIN_FUNCTIONAL_ASSESSMENT: 0-10
PAIN_FUNCTIONAL_ASSESSMENT: NONE - DENIES PAIN

## 2025-05-03 ASSESSMENT — PAIN DESCRIPTION - FREQUENCY: FREQUENCY: CONTINUOUS

## 2025-05-03 ASSESSMENT — PAIN DESCRIPTION - PAIN TYPE: TYPE: ACUTE PAIN

## 2025-05-03 ASSESSMENT — PAIN SCALES - GENERAL: PAINLEVEL_OUTOF10: 5

## 2025-05-03 ASSESSMENT — PAIN DESCRIPTION - ORIENTATION: ORIENTATION: RIGHT

## 2025-05-03 ASSESSMENT — PAIN DESCRIPTION - LOCATION: LOCATION: LEG

## 2025-05-03 NOTE — DISCHARGE INSTRUCTIONS
You are seen here for bruising along her thigh.  Continue to follow-up with your primary care doctor as scheduled.  Return here if you have any chest pain, difficulty breathing, or significant worsening pain in your leg.  Take your medications as prescribed.

## 2025-05-03 NOTE — ED NOTES
Patient to the ED with right leg swelling. Patient states that this has been developing for the past day. She reports history of DVT's. She denies any chest pain, shortness of breath or any thoracic complaints. She denies any other complaints or symptomology. Patient is resting in bed with easy and unlabored respirations. Call light in reach. Side rails up x2. No distress is noted. Patient denies further complaints or concerns. Will monitor.

## 2025-05-03 NOTE — ED PROVIDER NOTES
[facility] EMERGENCY DEPARTMENT      EMERGENCY MEDICINE     Pt Name: Yeny Fine  MRN: [id number]  Birthdate 1950  Date of evaluation: 5/3/2025  Provider: Calin Lino DO  Supervising Physician: Franki Worley DO    CHIEF COMPLAINT       Chief Complaint   Patient presents with    Leg Swelling     Right leg, pain     HISTORY OF PRESENT ILLNESS   Yeny Fine is a 74 y.o. female with a history of DM, HTN, DVT on Eliquis status post thrombectomy for extensive right lower extremity DVT on 4/27/2025 who presents to the emergency department from home for evaluation of right medial thigh ecchymosis that she noticed this morning.  Patient having some mild pain to her right lower extremity which is not new.  Patient denies any other rashes or new ecchymosis to any other parts of her body, denies any fever, chills, nausea, vomiting.    PASTMEDICAL HISTORY     Past Medical History:   Diagnosis Date    Arthritis     Asthma     situational    Depression     Fibromyalgia     GERD (gastroesophageal reflux disease)     Hx of blood clots 2003    DVT after arthroscopy right knee    Hyperlipidemia     Hypertension     Hypothyroidism     Restless legs syndrome     Type II or unspecified type diabetes mellitus without mention of complication, not stated as uncontrolled     Unspecified sleep apnea     no CPAP       Patient Active Problem List   Diagnosis Code    Depression F32.A    Type II or unspecified type diabetes mellitus without mention of complication, not stated as uncontrolled E11.9    GERD (gastroesophageal reflux disease) K21.9    Hypertension I10    Asthma J45.909    Hyperlipidemia E78.5    Personal history of DVT (deep vein thrombosis) Z86.718    Fibromyalgia M79.7    Obesity (BMI 30-39.9) E66.9    Radial scar of breast N64.89    Obstructive sleep apnea of adult G47.33    Acute deep vein thrombosis (DVT) of femoral vein of right lower extremity (HCC) I82.411    Physical deconditioning R53.81     SURGICAL  thrombus, DIC            2)  Treatment and Disposition         ED Reassessment:  See ED course         Shared Decision-Making was performed and disposition discussed with the        Patient/Family and questions answered          Social determinants of health impacting treatment or disposition: N/A         Code Status: Full      Summary of Patient Presentation:      Medical Decision Making  Well-appearing 74-year-old female with a history of recent thrombectomy for extensive DVT in the right lower extremity who presents with new ecchymosis to the right medial thigh.  Vitals are within normal limits, soft compartments, mild pain, ecchymosis is limited to that leg.  Good pulses. Duplex to assess for clot and CTA to assess for hematoma or possible thrombus. Work up reassuring. Pt provided with reassurance, d/c home. Ecchymosis likely related to access from her recent procedure.     Amount and/or Complexity of Data Reviewed  Labs: ordered.  Radiology: ordered.    /  ED Course as of 05/03/25 1955   Sat May 03, 2025   1533 Discharge summary on 4/29/2025 reviewed [AC]   1741 IMPRESSION:  1. One of the paired right anterior tibial veins appears to demonstrate absent  flow and hypoechoic appearance likely representing DVT.     2. There is thrombus present within the greater saphenous vein proximally. This  was present on the previous examination dated 4/26/2025.   [AC]   1749 Hemoglobin Quant(!): 8.5  At her baseline [AC]   1834 Dr Zambrano says these clots would not be amenable to his intervention based upon their location. He does not have a recommend disposition [AC]   1835 Pt was just started on Eliquis 1 week ago, she has an IVC filter. I do not think there would be any benefit in admitting her. Pt has superficial clot and a clot that is distal to the popliteal vein. Pt not having any pain at this site.  [AC]   1839 Awaiting CTA for evaluation of possible deep hematoma [AC]   1850 Resting comfortably in bed, updated on

## 2025-05-04 NOTE — DISCHARGE INSTR - COC
Continuity of Care Form    Patient Name: Yeny Fine   :  1950  MRN:  [id number]    Admit date:  5/3/2025  Discharge date:  ***    Code Status Order: Prior   Advance Directives:     Admitting Physician:  No admitting provider for patient encounter.  PCP: Tracy Jon APRN - CNP    Discharging Nurse: ***  Discharging Hospital Unit/Room#:   Discharging Unit Phone Number: ***    Emergency Contact:   Extended Emergency Contact Information  Primary Emergency Contact: KamaljitLevar mayic  Address: [address] [place] [address]  Home Phone: [phone]  Mobile Phone: [phone]  Relation: Child  Hearing or visual needs: None  Other needs: None  Preferred language: English   needed? No  Secondary Emergency Contact: JESSIE KENNY  Home Phone: [phone]  Mobile Phone: [phone]  Relation: Brother/Sister    Past Surgical History:  Past Surgical History:   Procedure Laterality Date    BREAST LUMPECTOMY Right 10/17/2013    radial scar removal    CARDIAC CATHETERIZATION  ??    results ok    COLONOSCOPY      last one ??    COLONOSCOPY  2017    Dr Gaston    CYSTOSCOPY N/A 2024    Cystoscopy Left Stent Insertion performed by Fausto Short Jr., MD at [facility] [place] OR    EYE SURGERY  2016    left cataract surgery    HYSTERECTOMY (CERVIX STATUS UNKNOWN)      IR GUIDED THROMB MECH VEIN  2025    IR GUIDED THROMB MECH VEIN 2025 Noah Nolen MD [facility] [place] SPECIAL PROCEDURES    JOINT REPLACEMENT Right     total knee replacement    KNEE ARTHROSCOPY Right     AL BX OF BREAST,VACUUM ASST,IMAGE GUIDE Right 2013    Radial scar    SINUS SURGERY  1994    SKIN BIOPSY      TONSILLECTOMY  89    UPPER GASTROINTESTINAL ENDOSCOPY  2017    Dr Gaston    URETER SURGERY Left 3/15/2024    Cystoscopy Left Ureteroscopy Laser Lithotripsy and Left Ureteral Stent Exchange performed by Fausto Short Jr., MD at [facility] [place] OR    VENA CAVA

## 2025-05-07 NOTE — PROGRESS NOTES
Physician Progress Note      PATIENT:               SONYA GOMEZ  CSN #:                  [id number]  :                       1950  ADMIT DATE:       2025 12:14 PM  DISCH DATE:        2025 4:58 PM  RESPONDING  PROVIDER #:        Ira Puente CNP          QUERY TEXT:    The attending physician is required to clarify conflicting documentation in   the medical record.  Noted documentation of \"obesity\" in ED provider note,   \"mild obesity\" in H&P, \"and \"morbid obesity\" in  IM progress note.    The clinical indicators include:  BMI per H&P= 31.53  Per ED note, weight= 80.7kg/178 lbs, Height= 1.6m/5'3\"    Risk factors: age 74, DM, HTN, depression, fibromyalgia  Treatment: labs, low fat diet, diet education  Options provided:  -- Morbid obesity confirmed.  -- Morbid obesity ruled out and obesity, class 1 confirmed.  -- Morbid obesity ruled out and obesity, class 2 confirmed.  -- Morbid obesity ruled out and obesity, class 3 confirmed.  -- Other - I will add my own diagnosis  -- Disagree - Not applicable / Not valid  -- Disagree - Clinically unable to determine / Unknown  -- Refer to Clinical Documentation Reviewer    PROVIDER RESPONSE TEXT:    After study, Morbid obesity ruled out and obesity, class 1 confirmed.    Query created by: Kelsy Mahmood on 2025 11:13 AM      Electronically signed by:  Ira Puente CNP 2025 12:10 PM

## 2025-06-18 ENCOUNTER — HOSPITAL ENCOUNTER (EMERGENCY)
Age: 75
Discharge: HOME | End: 2025-06-18
Payer: MEDICARE

## 2025-06-18 VITALS
RESPIRATION RATE: 16 BRPM | OXYGEN SATURATION: 97 % | SYSTOLIC BLOOD PRESSURE: 145 MMHG | DIASTOLIC BLOOD PRESSURE: 72 MMHG | HEIGHT: 63 IN | TEMPERATURE: 97.7 F | BODY MASS INDEX: 31.54 KG/M2 | HEART RATE: 48 BPM | WEIGHT: 178 LBS

## 2025-06-18 DIAGNOSIS — N30.00 ACUTE CYSTITIS WITHOUT HEMATURIA: Primary | ICD-10-CM

## 2025-06-18 LAB
BILIRUB UR STRIP.AUTO-MCNC: NEGATIVE MG/DL
CHARACTER UR: CLEAR
COLOR, UA: YELLOW
GLUCOSE UR QL STRIP.AUTO: NEGATIVE MG/DL
KETONES UR QL STRIP.AUTO: NEGATIVE
NITRITE UR QL STRIP.AUTO: POSITIVE
PH UR STRIP.AUTO: 5 [PH] (ref 5–9)
PROT UR STRIP.AUTO-MCNC: 30 MG/DL
RBC #/AREA URNS HPF: NEGATIVE /[HPF]
SP GR UR STRIP.AUTO: >= 1.03 (ref 1–1.03)
UROBILINOGEN, URINE: 0.2 EU/DL (ref 0.2–1)
WBC #/AREA URNS HPF: ABNORMAL /[HPF]

## 2025-06-18 PROCEDURE — 87186 SC STD MICRODIL/AGAR DIL: CPT

## 2025-06-18 PROCEDURE — 87077 CULTURE AEROBIC IDENTIFY: CPT

## 2025-06-18 PROCEDURE — 87086 URINE CULTURE/COLONY COUNT: CPT

## 2025-06-18 PROCEDURE — 99213 OFFICE O/P EST LOW 20 MIN: CPT

## 2025-06-18 PROCEDURE — 81003 URINALYSIS AUTO W/O SCOPE: CPT

## 2025-06-18 RX ORDER — CEPHALEXIN 500 MG/1
500 CAPSULE ORAL 2 TIMES DAILY
Qty: 14 CAPSULE | Refills: 0 | Status: SHIPPED | OUTPATIENT
Start: 2025-06-18 | End: 2025-06-25

## 2025-06-18 RX ORDER — PHENAZOPYRIDINE HYDROCHLORIDE 100 MG/1
100 TABLET, FILM COATED ORAL 3 TIMES DAILY PRN
Qty: 6 TABLET | Refills: 0 | Status: SHIPPED | OUTPATIENT
Start: 2025-06-18 | End: 2025-06-21

## 2025-06-18 ASSESSMENT — PAIN DESCRIPTION - ORIENTATION: ORIENTATION: RIGHT;UPPER

## 2025-06-18 ASSESSMENT — PAIN SCALES - GENERAL: PAINLEVEL_OUTOF10: 3

## 2025-06-18 ASSESSMENT — PAIN DESCRIPTION - LOCATION: LOCATION: LEG

## 2025-06-18 ASSESSMENT — PAIN - FUNCTIONAL ASSESSMENT: PAIN_FUNCTIONAL_ASSESSMENT: 0-10

## 2025-06-18 NOTE — ED PROVIDER NOTES
[facility]      URGENT CARE     Pt Name: Yeny Fine  MRN: [id number]  Birthdate 1950  Date of evaluation: 6/18/2025  Provider: ANA Rey CNP    Urgent Care Encounter     CHIEF COMPLAINT       Chief Complaint   Patient presents with    Urinary Frequency     Go frequently small amounts, cloudy, fatigue     HISTORY OF PRESENT ILLNESS   Yeny Fine is a 74 y.o. female who presents to urgent care chief complaint of concerns over potential urinary tract infection.  Describes urinary frequency/urgency over the last week.  Denies any burning with urination.  Denies seeing blood in her urine.  Denies flank pain.  Denies nausea or vomiting.  Denies fevers.  Denies taking any medications for treatments.    History obtained from patient    PAST MEDICAL HISTORY         Diagnosis Date    Arthritis     Asthma     situational    Depression     Fibromyalgia     GERD (gastroesophageal reflux disease)     Hx of blood clots 2003    DVT after arthroscopy right knee    Hyperlipidemia     Hypertension     Hypothyroidism     Kidney stone     Restless legs syndrome     Type II or unspecified type diabetes mellitus without mention of complication, not stated as uncontrolled     Unspecified sleep apnea     no CPAP     SURGICALHISTORY     Patient  has a past surgical history that includes sinus surgery (1994); Tonsillectomy (1955); Hysterectomy (1992); Knee arthroscopy (Right, 2003); Vena Cava Filter Placement (2003); Cardiac catheterization (2006??); Colonoscopy; joint replacement (Right, 2013); eye surgery (feb. 2016); skin biopsy; Upper gastrointestinal endoscopy (07/25/2017); Colonoscopy (08/11/2017); pr bx of breast,vacuum asst,image guide (Right, 09/26/2013); Breast lumpectomy (Right, 10/17/2013); Cystoscopy (N/A, 2/26/2024); Ureter surgery (Left, 3/15/2024); and IR GUIDED THROMB MECH VEIN (4/27/2025).  CURRENT MEDICATIONS       Previous Medications    ACETAMINOPHEN (TYLENOL) 325 MG TABLET

## 2025-06-18 NOTE — DISCHARGE INSTRUCTIONS
Your urinalysis was consistent with urinary tract infection today.  Take antibiotics until they are gone even if you are feeling better.  Please hydrate well keeping urine clear/pale yellow, this is the best way to treat UTIs.  Okay to use concomitant phenazopyridine (tradename Azo) to help with urinary tract symptoms for the next 1-2 days while antibiotics are kicking in.  Phenazopyridine will make your urine orange.  Do not be alarmed, this is a normal anticipated finding while you are taking phenazopyridine.    We are sending urine for culture, if indicated we will call you in a few days to optimize/change antibiotics.     If you are experiencing symptoms including but not limited to uncontrolled pain, fevers (especially while on antibiotics), uncontrolled bleeding or any other urgent/emergent medical concerns please attend the ER for ongoing care.    If your symptoms fail to improve or worsen please see your family doctor ASAP.    I hope you are feeling better soon!

## 2025-06-18 NOTE — ED TRIAGE NOTES
To room 2 c/o \" Stephany got a UTI\" Pt reports \" NICO Anton released me and referred me to Dr Tineo but I dont want to see him again\"

## 2025-06-22 LAB
BACTERIA UR CULT: ABNORMAL
BACTERIA UR CULT: ABNORMAL
ORGANISM: ABNORMAL

## 2025-06-23 DIAGNOSIS — N30.00 ACUTE CYSTITIS WITHOUT HEMATURIA: Primary | ICD-10-CM

## 2025-06-23 RX ORDER — CIPROFLOXACIN 500 MG/1
500 TABLET, FILM COATED ORAL 2 TIMES DAILY
Qty: 14 TABLET | Refills: 0 | Status: SHIPPED | OUTPATIENT
Start: 2025-06-23 | End: 2025-06-30

## 2025-07-07 ENCOUNTER — OFFICE VISIT (OUTPATIENT)
Age: 75
End: 2025-07-07

## 2025-07-07 VITALS
HEART RATE: 61 BPM | WEIGHT: 179.4 LBS | SYSTOLIC BLOOD PRESSURE: 120 MMHG | BODY MASS INDEX: 31.79 KG/M2 | DIASTOLIC BLOOD PRESSURE: 72 MMHG | TEMPERATURE: 98.3 F | OXYGEN SATURATION: 97 % | HEIGHT: 63 IN

## 2025-07-07 DIAGNOSIS — E78.2 MIXED HYPERLIPIDEMIA: ICD-10-CM

## 2025-07-07 DIAGNOSIS — G89.29 CHRONIC PAIN OF LEFT KNEE: ICD-10-CM

## 2025-07-07 DIAGNOSIS — E11.9 TYPE 2 DIABETES MELLITUS WITHOUT COMPLICATION, WITHOUT LONG-TERM CURRENT USE OF INSULIN (HCC): Primary | ICD-10-CM

## 2025-07-07 DIAGNOSIS — R15.9 FULL INCONTINENCE OF FECES: ICD-10-CM

## 2025-07-07 DIAGNOSIS — Z86.718 PERSONAL HISTORY OF DVT (DEEP VEIN THROMBOSIS): ICD-10-CM

## 2025-07-07 DIAGNOSIS — R53.83 FATIGUE, UNSPECIFIED TYPE: ICD-10-CM

## 2025-07-07 DIAGNOSIS — M25.562 CHRONIC PAIN OF LEFT KNEE: ICD-10-CM

## 2025-07-07 DIAGNOSIS — D64.9 ANEMIA, UNSPECIFIED TYPE: ICD-10-CM

## 2025-07-07 LAB
BILIRUBIN, POC: NEGATIVE
BLOOD URINE, POC: NEGATIVE
CLARITY, POC: NORMAL
COLOR, POC: NORMAL
GLUCOSE URINE, POC: NEGATIVE MG/DL
KETONES, POC: NEGATIVE MG/DL
LEUKOCYTE EST, POC: NEGATIVE
NITRITE, POC: NEGATIVE
PH, POC: 5.5
PROTEIN, POC: 100 MG/DL
SPECIFIC GRAVITY, POC: >=1.03
UROBILINOGEN, POC: 0.2 MG/DL

## 2025-07-07 PROCEDURE — 1160F RVW MEDS BY RX/DR IN RCRD: CPT | Performed by: NURSE PRACTITIONER

## 2025-07-07 PROCEDURE — 81003 URINALYSIS AUTO W/O SCOPE: CPT | Performed by: NURSE PRACTITIONER

## 2025-07-07 PROCEDURE — 1159F MED LIST DOCD IN RCRD: CPT | Performed by: NURSE PRACTITIONER

## 2025-07-07 PROCEDURE — 82962 GLUCOSE BLOOD TEST: CPT | Performed by: NURSE PRACTITIONER

## 2025-07-07 PROCEDURE — 3074F SYST BP LT 130 MM HG: CPT | Performed by: NURSE PRACTITIONER

## 2025-07-07 PROCEDURE — 3078F DIAST BP <80 MM HG: CPT | Performed by: NURSE PRACTITIONER

## 2025-07-07 PROCEDURE — 3051F HG A1C>EQUAL 7.0%<8.0%: CPT | Performed by: NURSE PRACTITIONER

## 2025-07-07 PROCEDURE — 1123F ACP DISCUSS/DSCN MKR DOCD: CPT | Performed by: NURSE PRACTITIONER

## 2025-07-07 PROCEDURE — 99205 OFFICE O/P NEW HI 60 MIN: CPT | Performed by: NURSE PRACTITIONER

## 2025-07-07 RX ORDER — GLUCOSAMINE HCL/CHONDROITIN SU 500-400 MG
CAPSULE ORAL
Qty: 90 STRIP | Refills: 0 | Status: SHIPPED | OUTPATIENT
Start: 2025-07-07

## 2025-07-07 RX ORDER — DICYCLOMINE HYDROCHLORIDE 10 MG/1
10 CAPSULE ORAL
Qty: 120 CAPSULE | Refills: 0 | Status: SHIPPED | OUTPATIENT
Start: 2025-07-07 | End: 2025-07-08

## 2025-07-07 SDOH — ECONOMIC STABILITY: FOOD INSECURITY: WITHIN THE PAST 12 MONTHS, YOU WORRIED THAT YOUR FOOD WOULD RUN OUT BEFORE YOU GOT MONEY TO BUY MORE.: SOMETIMES TRUE

## 2025-07-07 SDOH — ECONOMIC STABILITY: FOOD INSECURITY: WITHIN THE PAST 12 MONTHS, THE FOOD YOU BOUGHT JUST DIDN'T LAST AND YOU DIDN'T HAVE MONEY TO GET MORE.: SOMETIMES TRUE

## 2025-07-07 ASSESSMENT — ENCOUNTER SYMPTOMS
BACK PAIN: 0
WHEEZING: 0
EYE PAIN: 0
ABDOMINAL DISTENTION: 0
BLOOD IN STOOL: 0
PHOTOPHOBIA: 0
ABDOMINAL PAIN: 0
RHINORRHEA: 0
NAUSEA: 0
SHORTNESS OF BREATH: 1
SORE THROAT: 0
VOMITING: 0
DIARRHEA: 1

## 2025-07-07 ASSESSMENT — PATIENT HEALTH QUESTIONNAIRE - PHQ9
SUM OF ALL RESPONSES TO PHQ QUESTIONS 1-9: 1
SUM OF ALL RESPONSES TO PHQ QUESTIONS 1-9: 1
2. FEELING DOWN, DEPRESSED OR HOPELESS: NOT AT ALL
SUM OF ALL RESPONSES TO PHQ QUESTIONS 1-9: 1
SUM OF ALL RESPONSES TO PHQ QUESTIONS 1-9: 1
1. LITTLE INTEREST OR PLEASURE IN DOING THINGS: SEVERAL DAYS

## 2025-07-07 NOTE — PATIENT INSTRUCTIONS
[place] [employer] Resources*  (Call [employer]/[facility] if need more resources.)    Home Delivered Meals:  [place] Meals: [phone] for people and pets  [employer] Delivered Meals:  Phone: 1-505.113.3363  [facility] ([place] only):  Phone: 817.939.8938  Moms Meals:  What they offer: Nutritious refrigerated meals. Available to individuals with Medicare advantage plans, Medicaid plans, Long-Term services and supports (LTSS) programs and self-pay at $7.99 a meal.  Renal-Friendly, pureed and Gluten free at $8.99 a meal plus flat rate shipping.   Phone: 1-227.700.3683  Website: https://www.[employer]    Senior Nutrition program:  Phone: 1-887.769.6401  [employer]Z Home Delivered Meals (From [place]):  What they offer: Meals for seniors, disabled, recovering from an injury or illness on [facility]. Both privately and through homecare services like [facility] and [facility].   Phone: 1-415.383.1599  Website: https://[employer].Emerging Threats    Other Food Resources:  MOW (Meals on Wheels) Contact Area Agency on Aging   Phone: 971.600.6680 or 1-672.989.6651    [facility]- Nutrition Program   What they offer: Nutrition education and nutritious foods for Pregnant women, women who just had a baby, breastfeeding moms, infants and children up to the age of 5.   Phone: 803.263.2091   Website: https://www.InvertirOnline.comcoTintriypublichGetAppth.org  [facility]- Provides Meals  What they offer: Provide food boxes to those in need monthly.  Phone: 700.700.8479  Website: https://[employer].org/ministries/[facility]  [employer]-Food/ Clothing   What they offer:  Provides a 3-day supply of food for individuals once a month.   Phone: 329.164.8804  Website: https://[employer].org/author/limacup1  [employer]-Meal Site   Phone: 704.114.4568  [place] [employer]- Meal Site  Phone: 1-762.462.5826  Our Daily [employer]:  What they offer: Provides continental breakfast and

## 2025-07-07 NOTE — PROGRESS NOTES
Expiration Date:   7/7/2026    GEORGE - Cristiano Sanches MD, Orthopedic Surgery, [place]     Referral Priority:   Routine     Referral Type:   Eval and Treat     Referral Reason:   Specialty Services Required     Referred to Provider:   Cristiano Sanches MD     Requested Specialty:   Orthopedic Surgery     Number of Visits Requested:   1    POCT Glucose    POCT Urinalysis No Micro (Auto)         Follow-up  A follow-up appointment is scheduled for 1 month from now.      Yeny Fine was counseled regarding symptoms of current diagnosis, course and complications of disease if inadequately treated.  Discussed side effects of medications, diagnosis, treatment options, and prognosis along with risks, benefits, complications, and alternatives of treatment including labs, imaging and other studies/treatment targets and goals.  she verbalized understanding of instructions and counseling.      Return in about 4 weeks (around 8/4/2025) for Medicare wellness.    On this date 7/7/2025 I have spent 65 minutes reviewing previous notes, test results and face to face with the patient discussing the diagnosis and importance of compliance with the treatment plan as well as documenting on the day of the visit.    The patient (or guardian, if applicable) and other individuals in attendance with the patient were advised that Artificial Intelligence will be utilized during this visit to record, process the conversation to generate a clinical note, and support improvement of the AI technology. The patient (or guardian, if applicable) and other individuals in attendance at the appointment consented to the use of AI, including the recording.          Electronically signed by ANA Reynolds CNP on 7/7/2025 at 3:34 PM

## 2025-07-08 ENCOUNTER — RESULTS FOLLOW-UP (OUTPATIENT)
Age: 75
End: 2025-07-08

## 2025-07-08 ENCOUNTER — LAB (OUTPATIENT)
Dept: LAB | Age: 75
End: 2025-07-08

## 2025-07-08 DIAGNOSIS — R53.83 FATIGUE, UNSPECIFIED TYPE: ICD-10-CM

## 2025-07-08 DIAGNOSIS — E78.2 MIXED HYPERLIPIDEMIA: ICD-10-CM

## 2025-07-08 DIAGNOSIS — E11.9 TYPE 2 DIABETES MELLITUS WITHOUT COMPLICATION, WITHOUT LONG-TERM CURRENT USE OF INSULIN (HCC): ICD-10-CM

## 2025-07-08 DIAGNOSIS — D64.9 ANEMIA, UNSPECIFIED TYPE: ICD-10-CM

## 2025-07-08 LAB
ALBUMIN URINE, EXTERNAL: NORMAL
BASOPHILS ABSOLUTE: 0 THOU/MM3 (ref 0–0.1)
BASOPHILS NFR BLD AUTO: 0.6 %
CHOLEST SERPL-MCNC: 117 MG/DL (ref 100–199)
CREAT UR-MCNC: 291 MG/DL
CREATININE, URINE, EXTERNAL: 366
DEPRECATED MEAN GLUCOSE BLD GHB EST-ACNC: 120 MG/DL (ref 70–126)
DEPRECATED RDW RBC AUTO: 45.4 FL (ref 35–45)
EOSINOPHIL NFR BLD AUTO: 2.3 %
EOSINOPHILS ABSOLUTE: 0.1 THOU/MM3 (ref 0–0.4)
ERYTHROCYTE [DISTWIDTH] IN BLOOD BY AUTOMATED COUNT: 13.7 % (ref 11.5–14.5)
FERRITIN SERPL IA-MCNC: 44 NG/ML (ref 13–150)
HBA1C MFR BLD HPLC: 6 % (ref 4–6)
HCT VFR BLD AUTO: 41 % (ref 37–47)
HDLC SERPL-MCNC: 49 MG/DL
HGB BLD-MCNC: 12.7 GM/DL (ref 12–16)
IMM GRANULOCYTES # BLD AUTO: 0.01 THOU/MM3 (ref 0–0.07)
IMM GRANULOCYTES NFR BLD AUTO: 0.2 %
IRON SATN MFR SERPL: 14 % (ref 20–50)
IRON SERPL-MCNC: 44 UG/DL (ref 37–145)
LDLC SERPL CALC-MCNC: 47 MG/DL
LYMPHOCYTES ABSOLUTE: 1.7 THOU/MM3 (ref 1–4.8)
LYMPHOCYTES NFR BLD AUTO: 32.6 %
MCH RBC QN AUTO: 27.9 PG (ref 26–33)
MCHC RBC AUTO-ENTMCNC: 31 GM/DL (ref 32.2–35.5)
MCV RBC AUTO: 90.1 FL (ref 81–99)
MICROALBUMIN UR-MCNC: 8.03 MG/DL
MICROALBUMIN/CREAT RATIO PNL UR: 28 MG/G (ref 0–30)
MICROALBUMIN/CREAT UR: 11 MG/G{CREAT}
MONOCYTES ABSOLUTE: 0.4 THOU/MM3 (ref 0.4–1.3)
MONOCYTES NFR BLD AUTO: 8.3 %
NEUTROPHILS ABSOLUTE: 3 THOU/MM3 (ref 1.8–7.7)
NEUTROPHILS NFR BLD AUTO: 56 %
NRBC BLD AUTO-RTO: 0 /100 WBC
PLATELET # BLD AUTO: 194 THOU/MM3 (ref 130–400)
PMV BLD AUTO: 10.8 FL (ref 9.4–12.4)
RBC # BLD AUTO: 4.55 MILL/MM3 (ref 4.2–5.4)
TIBC SERPL-MCNC: 305 UG/DL (ref 171–450)
TRIGL SERPL-MCNC: 107 MG/DL (ref 0–199)
TSH SERPL DL<=0.05 MIU/L-ACNC: 2.39 UIU/ML (ref 0.27–4.2)
WBC # BLD AUTO: 5.3 THOU/MM3 (ref 4.8–10.8)

## 2025-07-08 RX ORDER — DICYCLOMINE HYDROCHLORIDE 10 MG/1
CAPSULE ORAL
Qty: 360 CAPSULE | Refills: 0 | Status: SHIPPED | OUTPATIENT
Start: 2025-07-08

## 2025-07-09 ENCOUNTER — LAB (OUTPATIENT)
Dept: LAB | Age: 75
End: 2025-07-09

## 2025-07-09 LAB
CREAT UR-MCNC: 366 MG/DL
MICROALBUMIN UR-MCNC: 3.91 MG/DL
MICROALBUMIN/CREAT RATIO PNL UR: 11 MG/G (ref 0–30)

## 2025-08-05 ENCOUNTER — OFFICE VISIT (OUTPATIENT)
Age: 75
End: 2025-08-05
Payer: MEDICARE

## 2025-08-05 VITALS
SYSTOLIC BLOOD PRESSURE: 120 MMHG | TEMPERATURE: 98.2 F | BODY MASS INDEX: 30.76 KG/M2 | DIASTOLIC BLOOD PRESSURE: 70 MMHG | HEART RATE: 50 BPM | OXYGEN SATURATION: 96 % | WEIGHT: 173.6 LBS | HEIGHT: 63 IN

## 2025-08-05 DIAGNOSIS — E11.9 TYPE 2 DIABETES MELLITUS WITHOUT COMPLICATION, WITHOUT LONG-TERM CURRENT USE OF INSULIN (HCC): ICD-10-CM

## 2025-08-05 DIAGNOSIS — Z86.718 PERSONAL HISTORY OF DVT (DEEP VEIN THROMBOSIS): ICD-10-CM

## 2025-08-05 DIAGNOSIS — E78.2 MIXED HYPERLIPIDEMIA: ICD-10-CM

## 2025-08-05 DIAGNOSIS — F34.1 PERSISTENT DEPRESSIVE DISORDER: ICD-10-CM

## 2025-08-05 DIAGNOSIS — Z12.31 ENCOUNTER FOR SCREENING MAMMOGRAM FOR MALIGNANT NEOPLASM OF BREAST: ICD-10-CM

## 2025-08-05 DIAGNOSIS — R15.9 FULL INCONTINENCE OF FECES: ICD-10-CM

## 2025-08-05 DIAGNOSIS — Z78.0 ASYMPTOMATIC MENOPAUSAL STATE: ICD-10-CM

## 2025-08-05 DIAGNOSIS — Z00.00 WELCOME TO MEDICARE PREVENTIVE VISIT: Primary | ICD-10-CM

## 2025-08-05 PROCEDURE — 1123F ACP DISCUSS/DSCN MKR DOCD: CPT | Performed by: NURSE PRACTITIONER

## 2025-08-05 PROCEDURE — 3044F HG A1C LEVEL LT 7.0%: CPT | Performed by: NURSE PRACTITIONER

## 2025-08-05 PROCEDURE — G0402 INITIAL PREVENTIVE EXAM: HCPCS | Performed by: NURSE PRACTITIONER

## 2025-08-05 PROCEDURE — 1159F MED LIST DOCD IN RCRD: CPT | Performed by: NURSE PRACTITIONER

## 2025-08-05 PROCEDURE — 1160F RVW MEDS BY RX/DR IN RCRD: CPT | Performed by: NURSE PRACTITIONER

## 2025-08-05 PROCEDURE — 1125F AMNT PAIN NOTED PAIN PRSNT: CPT | Performed by: NURSE PRACTITIONER

## 2025-08-05 PROCEDURE — 3074F SYST BP LT 130 MM HG: CPT | Performed by: NURSE PRACTITIONER

## 2025-08-05 PROCEDURE — 3078F DIAST BP <80 MM HG: CPT | Performed by: NURSE PRACTITIONER

## 2025-08-05 RX ORDER — CITALOPRAM HYDROBROMIDE 20 MG/1
20 TABLET ORAL DAILY
Qty: 90 TABLET | Refills: 3 | Status: SHIPPED | OUTPATIENT
Start: 2025-08-05 | End: 2026-07-31

## 2025-08-05 RX ORDER — ATORVASTATIN CALCIUM 20 MG/1
20 TABLET, FILM COATED ORAL DAILY
Qty: 90 TABLET | Refills: 3 | Status: SHIPPED | OUTPATIENT
Start: 2025-08-05

## 2025-08-05 ASSESSMENT — PATIENT HEALTH QUESTIONNAIRE - PHQ9
10. IF YOU CHECKED OFF ANY PROBLEMS, HOW DIFFICULT HAVE THESE PROBLEMS MADE IT FOR YOU TO DO YOUR WORK, TAKE CARE OF THINGS AT HOME, OR GET ALONG WITH OTHER PEOPLE: NOT DIFFICULT AT ALL
8. MOVING OR SPEAKING SO SLOWLY THAT OTHER PEOPLE COULD HAVE NOTICED. OR THE OPPOSITE, BEING SO FIGETY OR RESTLESS THAT YOU HAVE BEEN MOVING AROUND A LOT MORE THAN USUAL: NOT AT ALL
2. FEELING DOWN, DEPRESSED OR HOPELESS: NOT AT ALL
5. POOR APPETITE OR OVEREATING: NOT AT ALL
2. FEELING DOWN, DEPRESSED OR HOPELESS: NOT AT ALL
SUM OF ALL RESPONSES TO PHQ QUESTIONS 1-9: 0
3. TROUBLE FALLING OR STAYING ASLEEP: NOT AT ALL
9. THOUGHTS THAT YOU WOULD BE BETTER OFF DEAD, OR OF HURTING YOURSELF: NOT AT ALL
SUM OF ALL RESPONSES TO PHQ QUESTIONS 1-9: 0
7. TROUBLE CONCENTRATING ON THINGS, SUCH AS READING THE NEWSPAPER OR WATCHING TELEVISION: NOT AT ALL
9. THOUGHTS THAT YOU WOULD BE BETTER OFF DEAD, OR OF HURTING YOURSELF: NOT AT ALL
1. LITTLE INTEREST OR PLEASURE IN DOING THINGS: NOT AT ALL
SUM OF ALL RESPONSES TO PHQ QUESTIONS 1-9: 0
4. FEELING TIRED OR HAVING LITTLE ENERGY: NOT AT ALL
5. POOR APPETITE OR OVEREATING: NOT AT ALL
8. MOVING OR SPEAKING SO SLOWLY THAT OTHER PEOPLE COULD HAVE NOTICED. OR THE OPPOSITE, BEING SO FIGETY OR RESTLESS THAT YOU HAVE BEEN MOVING AROUND A LOT MORE THAN USUAL: NOT AT ALL
SUM OF ALL RESPONSES TO PHQ QUESTIONS 1-9: 0
4. FEELING TIRED OR HAVING LITTLE ENERGY: NOT AT ALL
3. TROUBLE FALLING OR STAYING ASLEEP: NOT AT ALL
SUM OF ALL RESPONSES TO PHQ QUESTIONS 1-9: 0
7. TROUBLE CONCENTRATING ON THINGS, SUCH AS READING THE NEWSPAPER OR WATCHING TELEVISION: NOT AT ALL
10. IF YOU CHECKED OFF ANY PROBLEMS, HOW DIFFICULT HAVE THESE PROBLEMS MADE IT FOR YOU TO DO YOUR WORK, TAKE CARE OF THINGS AT HOME, OR GET ALONG WITH OTHER PEOPLE: NOT DIFFICULT AT ALL

## 2025-08-05 ASSESSMENT — VISUAL ACUITY
OS_CC: 20/25
OD_CC: 20/225

## 2025-08-05 ASSESSMENT — ENCOUNTER SYMPTOMS
SHORTNESS OF BREATH: 0
NAUSEA: 0
PHOTOPHOBIA: 0
ABDOMINAL PAIN: 0

## 2025-08-09 ENCOUNTER — HOSPITAL ENCOUNTER (EMERGENCY)
Age: 75
Discharge: HOME | End: 2025-08-09
Payer: MEDICARE

## 2025-08-09 VITALS
HEIGHT: 63 IN | HEART RATE: 58 BPM | DIASTOLIC BLOOD PRESSURE: 81 MMHG | OXYGEN SATURATION: 95 % | WEIGHT: 173 LBS | SYSTOLIC BLOOD PRESSURE: 155 MMHG | BODY MASS INDEX: 30.65 KG/M2 | RESPIRATION RATE: 16 BRPM | TEMPERATURE: 96.9 F

## 2025-08-09 DIAGNOSIS — M25.562 CHRONIC PAIN OF LEFT KNEE: Primary | ICD-10-CM

## 2025-08-09 DIAGNOSIS — G89.29 CHRONIC PAIN OF LEFT KNEE: Primary | ICD-10-CM

## 2025-08-09 PROCEDURE — 99213 OFFICE O/P EST LOW 20 MIN: CPT | Performed by: EMERGENCY MEDICINE

## 2025-08-09 PROCEDURE — 99213 OFFICE O/P EST LOW 20 MIN: CPT

## 2025-08-09 RX ORDER — PREDNISONE 20 MG/1
20 TABLET ORAL 2 TIMES DAILY
Qty: 10 TABLET | Refills: 0 | Status: SHIPPED | OUTPATIENT
Start: 2025-08-09 | End: 2025-08-14

## 2025-08-09 ASSESSMENT — PAIN DESCRIPTION - PAIN TYPE: TYPE: ACUTE PAIN

## 2025-08-09 ASSESSMENT — PAIN - FUNCTIONAL ASSESSMENT
PAIN_FUNCTIONAL_ASSESSMENT: ACTIVITIES ARE NOT PREVENTED
PAIN_FUNCTIONAL_ASSESSMENT: 0-10

## 2025-08-09 ASSESSMENT — PAIN DESCRIPTION - FREQUENCY: FREQUENCY: INTERMITTENT

## 2025-08-09 ASSESSMENT — PAIN SCALES - GENERAL: PAINLEVEL_OUTOF10: 5

## 2025-08-09 ASSESSMENT — PAIN DESCRIPTION - ONSET: ONSET: ON-GOING

## 2025-08-09 ASSESSMENT — PAIN DESCRIPTION - ORIENTATION: ORIENTATION: LEFT

## 2025-08-09 ASSESSMENT — PAIN DESCRIPTION - LOCATION: LOCATION: KNEE

## 2025-08-09 ASSESSMENT — PAIN DESCRIPTION - DESCRIPTORS: DESCRIPTORS: ACHING

## 2025-08-11 DIAGNOSIS — Z01.810 PRE-OPERATIVE CARDIOVASCULAR EXAMINATION: Primary | ICD-10-CM

## 2025-08-13 ENCOUNTER — TELEPHONE (OUTPATIENT)
Age: 75
End: 2025-08-13

## 2025-08-19 ENCOUNTER — TELEPHONE (OUTPATIENT)
Age: 75
End: 2025-08-19

## 2025-08-29 ENCOUNTER — HOSPITAL ENCOUNTER (OUTPATIENT)
Dept: NUCLEAR MEDICINE | Age: 75
Discharge: HOME | End: 2025-08-29
Payer: MEDICARE

## 2025-08-29 ENCOUNTER — HOSPITAL ENCOUNTER (OUTPATIENT)
Age: 75
Discharge: HOME | End: 2025-08-31
Payer: MEDICARE

## 2025-08-29 VITALS — HEIGHT: 63 IN | BODY MASS INDEX: 30.12 KG/M2 | WEIGHT: 170 LBS

## 2025-08-29 DIAGNOSIS — Z01.810 PRE-OPERATIVE CARDIOVASCULAR EXAMINATION: ICD-10-CM

## 2025-08-29 PROCEDURE — 3430000000 HC RX DIAGNOSTIC RADIOPHARMACEUTICAL: Performed by: NURSE PRACTITIONER

## 2025-08-29 PROCEDURE — A9500 TC99M SESTAMIBI: HCPCS | Performed by: NURSE PRACTITIONER

## 2025-08-29 PROCEDURE — 6360000002 HC RX W HCPCS: Performed by: NURSE PRACTITIONER

## 2025-08-29 PROCEDURE — 93017 CV STRESS TEST TRACING ONLY: CPT

## 2025-08-29 PROCEDURE — 78452 HT MUSCLE IMAGE SPECT MULT: CPT

## 2025-08-29 RX ORDER — TETRAKIS(2-METHOXYISOBUTYLISOCYANIDE)COPPER(I) TETRAFLUOROBORATE 1 MG/ML
31.1 INJECTION, POWDER, LYOPHILIZED, FOR SOLUTION INTRAVENOUS
Status: COMPLETED | OUTPATIENT
Start: 2025-08-29 | End: 2025-08-29

## 2025-08-29 RX ORDER — REGADENOSON 0.08 MG/ML
0.4 INJECTION, SOLUTION INTRAVENOUS
Status: COMPLETED | OUTPATIENT
Start: 2025-08-29 | End: 2025-08-29

## 2025-08-29 RX ORDER — TETRAKIS(2-METHOXYISOBUTYLISOCYANIDE)COPPER(I) TETRAFLUOROBORATE 1 MG/ML
9 INJECTION, POWDER, LYOPHILIZED, FOR SOLUTION INTRAVENOUS
Status: COMPLETED | OUTPATIENT
Start: 2025-08-29 | End: 2025-08-29

## 2025-08-29 RX ADMIN — Medication 9 MILLICURIE: at 12:00

## 2025-08-29 RX ADMIN — REGADENOSON 0.4 MG: 0.08 INJECTION, SOLUTION INTRAVENOUS at 12:49

## 2025-08-29 RX ADMIN — Medication 31.1 MILLICURIE: at 12:50

## 2025-08-30 LAB
ECHO BSA: 1.85 M2
NUC STRESS EJECTION FRACTION: 72 %
STRESS BASELINE DIAS BP: 74 MMHG
STRESS BASELINE HR: 56 BPM
STRESS BASELINE SYS BP: 137 MMHG
STRESS ESTIMATED WORKLOAD: 1 METS
STRESS PEAK DIAS BP: 74 MMHG
STRESS PEAK SYS BP: 148 MMHG
STRESS PERCENT HR ACHIEVED: 50 %
STRESS POST PEAK HR: 72 BPM
STRESS RATE PRESSURE PRODUCT: NORMAL BPM*MMHG
STRESS STAGE 1 BP: NORMAL MMHG
STRESS STAGE 1 DURATION: 1 MIN:SEC
STRESS STAGE 1 HR: 55 BPM
STRESS STAGE 2 BP: NORMAL MMHG
STRESS STAGE 2 DURATION: 1 MIN:SEC
STRESS STAGE 2 HR: 63 BPM
STRESS STAGE 3 BP: NORMAL MMHG
STRESS STAGE 3 DURATION: 1 MIN:SEC
STRESS STAGE 3 HR: 59 BPM
STRESS STAGE RECOVERY 1 BP: NORMAL MMHG
STRESS STAGE RECOVERY 1 DURATION: 1 MIN:SEC
STRESS STAGE RECOVERY 1 HR: 58 BPM
STRESS STAGE RECOVERY 2 BP: NORMAL MMHG
STRESS STAGE RECOVERY 2 DURATION: 1 MIN:SEC
STRESS STAGE RECOVERY 2 HR: 66 BPM
STRESS STAGE RECOVERY 3 BP: NORMAL MMHG
STRESS STAGE RECOVERY 3 DURATION: 1 MIN:SEC
STRESS STAGE RECOVERY 3 HR: 57 BPM
STRESS STAGE RECOVERY 4 BP: NORMAL MMHG
STRESS STAGE RECOVERY 4 DURATION: 2 MIN:SEC
STRESS STAGE RECOVERY 4 HR: 56 BPM
STRESS TARGET HR: 144 BPM

## 2025-09-02 ENCOUNTER — OFFICE VISIT (OUTPATIENT)
Age: 75
End: 2025-09-02
Payer: MEDICARE

## 2025-09-02 ENCOUNTER — LAB (OUTPATIENT)
Dept: LAB | Age: 75
End: 2025-09-02

## 2025-09-02 VITALS
SYSTOLIC BLOOD PRESSURE: 120 MMHG | TEMPERATURE: 98.3 F | DIASTOLIC BLOOD PRESSURE: 82 MMHG | HEART RATE: 47 BPM | BODY MASS INDEX: 30.83 KG/M2 | HEIGHT: 63 IN | WEIGHT: 174 LBS | OXYGEN SATURATION: 98 %

## 2025-09-02 DIAGNOSIS — Z86.718 PERSONAL HISTORY OF DVT (DEEP VEIN THROMBOSIS): ICD-10-CM

## 2025-09-02 DIAGNOSIS — R53.83 FATIGUE, UNSPECIFIED TYPE: ICD-10-CM

## 2025-09-02 DIAGNOSIS — I10 PRIMARY HYPERTENSION: ICD-10-CM

## 2025-09-02 DIAGNOSIS — Z01.818 PRE-OPERATIVE CLEARANCE: Primary | ICD-10-CM

## 2025-09-02 PROBLEM — I82.411 ACUTE DEEP VEIN THROMBOSIS (DVT) OF FEMORAL VEIN OF RIGHT LOWER EXTREMITY (HCC): Status: RESOLVED | Noted: 2025-04-26 | Resolved: 2025-09-02

## 2025-09-02 LAB
ANION GAP SERPL CALC-SCNC: 11 MEQ/L (ref 8–16)
BACTERIA URNS QL MICRO: ABNORMAL /HPF
BASOPHILS ABSOLUTE: 0 THOU/MM3 (ref 0–0.1)
BASOPHILS NFR BLD AUTO: 0.5 %
BILIRUB UR QL STRIP.AUTO: NEGATIVE
BUN SERPL-MCNC: 13 MG/DL (ref 8–23)
CALCIUM SERPL-MCNC: 9.5 MG/DL (ref 8.5–10.5)
CASTS #/AREA URNS LPF: ABNORMAL /LPF
CASTS 2: ABNORMAL /LPF
CHARACTER UR: ABNORMAL
CHLORIDE SERPL-SCNC: 105 MEQ/L (ref 98–111)
CO2 SERPL-SCNC: 26 MEQ/L (ref 22–29)
COLOR, UA: YELLOW
CREAT SERPL-MCNC: 0.8 MG/DL (ref 0.5–0.9)
CRYSTALS URNS MICRO: ABNORMAL
DEPRECATED MEAN GLUCOSE BLD GHB EST-ACNC: 147 MG/DL (ref 70–126)
DEPRECATED RDW RBC AUTO: 50.2 FL (ref 35–45)
EOSINOPHIL NFR BLD AUTO: 2.4 %
EOSINOPHILS ABSOLUTE: 0.2 THOU/MM3 (ref 0–0.4)
EPITHELIAL CELLS, UA: ABNORMAL /HPF
ERYTHROCYTE [DISTWIDTH] IN BLOOD BY AUTOMATED COUNT: 15.6 % (ref 11.5–14.5)
GFR SERPL CREATININE-BSD FRML MDRD: 76 ML/MIN/1.73M2
GLUCOSE SERPL-MCNC: 125 MG/DL (ref 74–109)
GLUCOSE UR QL STRIP.AUTO: NEGATIVE MG/DL
HBA1C MFR BLD HPLC: 6.9 % (ref 4–6)
HCT VFR BLD AUTO: 44 % (ref 37–47)
HGB BLD-MCNC: 13.4 GM/DL (ref 12–16)
HGB UR QL STRIP.AUTO: NEGATIVE
IMM GRANULOCYTES # BLD AUTO: 0.04 THOU/MM3 (ref 0–0.07)
IMM GRANULOCYTES NFR BLD AUTO: 0.5 %
KETONES UR QL STRIP.AUTO: ABNORMAL
LYMPHOCYTES ABSOLUTE: 1.7 THOU/MM3 (ref 1–4.8)
LYMPHOCYTES NFR BLD AUTO: 20.1 %
MCH RBC QN AUTO: 27.1 PG (ref 26–33)
MCHC RBC AUTO-ENTMCNC: 30.5 GM/DL (ref 32.2–35.5)
MCV RBC AUTO: 89.1 FL (ref 81–99)
MISCELLANEOUS 2: ABNORMAL
MONOCYTES ABSOLUTE: 0.7 THOU/MM3 (ref 0.4–1.3)
MONOCYTES NFR BLD AUTO: 8.3 %
NEUTROPHILS ABSOLUTE: 5.9 THOU/MM3 (ref 1.8–7.7)
NEUTROPHILS NFR BLD AUTO: 68.2 %
NITRITE UR QL STRIP: NEGATIVE
NRBC BLD AUTO-RTO: 0 /100 WBC
PH UR STRIP.AUTO: 5.5 [PH] (ref 5–9)
PLATELET # BLD AUTO: 206 THOU/MM3 (ref 130–400)
PMV BLD AUTO: 11.7 FL (ref 9.4–12.4)
POTASSIUM SERPL-SCNC: 4.1 MEQ/L (ref 3.5–5.2)
PROT UR STRIP.AUTO-MCNC: ABNORMAL MG/DL
RBC # BLD AUTO: 4.94 MILL/MM3 (ref 4.2–5.4)
RBC URINE: ABNORMAL /HPF
RENAL EPI CELLS #/AREA URNS HPF: ABNORMAL /[HPF]
SODIUM SERPL-SCNC: 142 MEQ/L (ref 135–145)
SP GR UR REFRACT.AUTO: 1.02 (ref 1–1.03)
UROBILINOGEN, URINE: 0.2 EU/DL (ref 0–1)
WBC # BLD AUTO: 8.7 THOU/MM3 (ref 4.8–10.8)
WBC #/AREA URNS HPF: ABNORMAL /HPF
WBC #/AREA URNS HPF: ABNORMAL /[HPF]
YEAST LIKE FUNGI URNS QL MICRO: ABNORMAL

## 2025-09-02 PROCEDURE — 99213 OFFICE O/P EST LOW 20 MIN: CPT | Performed by: NURSE PRACTITIONER

## 2025-09-02 PROCEDURE — 3074F SYST BP LT 130 MM HG: CPT | Performed by: NURSE PRACTITIONER

## 2025-09-02 PROCEDURE — 1159F MED LIST DOCD IN RCRD: CPT | Performed by: NURSE PRACTITIONER

## 2025-09-02 PROCEDURE — 1160F RVW MEDS BY RX/DR IN RCRD: CPT | Performed by: NURSE PRACTITIONER

## 2025-09-02 PROCEDURE — 1123F ACP DISCUSS/DSCN MKR DOCD: CPT | Performed by: NURSE PRACTITIONER

## 2025-09-02 PROCEDURE — 3079F DIAST BP 80-89 MM HG: CPT | Performed by: NURSE PRACTITIONER

## 2025-09-02 RX ORDER — METOPROLOL TARTRATE 25 MG/1
12.5 TABLET, FILM COATED ORAL 2 TIMES DAILY
Qty: 30 TABLET | Refills: 0
Start: 2025-09-02

## 2025-09-02 RX ORDER — METOPROLOL TARTRATE 25 MG/1
25 TABLET, FILM COATED ORAL 2 TIMES DAILY
Qty: 60 TABLET | Refills: 0
Start: 2025-09-02 | End: 2025-09-02

## 2025-09-02 SDOH — ECONOMIC STABILITY: FOOD INSECURITY: WITHIN THE PAST 12 MONTHS, THE FOOD YOU BOUGHT JUST DIDN'T LAST AND YOU DIDN'T HAVE MONEY TO GET MORE.: OFTEN TRUE

## 2025-09-02 SDOH — ECONOMIC STABILITY: FOOD INSECURITY: WITHIN THE PAST 12 MONTHS, YOU WORRIED THAT YOUR FOOD WOULD RUN OUT BEFORE YOU GOT MONEY TO BUY MORE.: OFTEN TRUE

## 2025-09-02 ASSESSMENT — PATIENT HEALTH QUESTIONNAIRE - PHQ9
SUM OF ALL RESPONSES TO PHQ QUESTIONS 1-9: 0
SUM OF ALL RESPONSES TO PHQ QUESTIONS 1-9: 0
1. LITTLE INTEREST OR PLEASURE IN DOING THINGS: NOT AT ALL
SUM OF ALL RESPONSES TO PHQ QUESTIONS 1-9: 0
2. FEELING DOWN, DEPRESSED OR HOPELESS: NOT AT ALL
SUM OF ALL RESPONSES TO PHQ QUESTIONS 1-9: 0

## 2025-09-02 ASSESSMENT — ENCOUNTER SYMPTOMS
NAUSEA: 0
CHEST TIGHTNESS: 0
SHORTNESS OF BREATH: 0
ABDOMINAL PAIN: 0

## 2025-09-03 LAB
BACTERIA UR CULT: ABNORMAL
MECA+MECC+MREJ ISLT/SPM QL: NEGATIVE
ORGANISM: ABNORMAL

## 2025-09-04 ENCOUNTER — HOSPITAL ENCOUNTER (EMERGENCY)
Age: 75
Discharge: HOME | End: 2025-09-04
Payer: MEDICARE

## 2025-09-04 VITALS
RESPIRATION RATE: 16 BRPM | TEMPERATURE: 97.9 F | OXYGEN SATURATION: 95 % | SYSTOLIC BLOOD PRESSURE: 127 MMHG | HEIGHT: 63 IN | DIASTOLIC BLOOD PRESSURE: 102 MMHG | WEIGHT: 170 LBS | BODY MASS INDEX: 30.12 KG/M2 | HEART RATE: 60 BPM

## 2025-09-04 DIAGNOSIS — U07.1 COVID: Primary | ICD-10-CM

## 2025-09-04 LAB — SARS-COV-2 RDRP RESP QL NAA+PROBE: DETECTED

## 2025-09-04 PROCEDURE — 87635 SARS-COV-2 COVID-19 AMP PRB: CPT

## 2025-09-04 PROCEDURE — 99213 OFFICE O/P EST LOW 20 MIN: CPT | Performed by: NURSE PRACTITIONER

## 2025-09-04 ASSESSMENT — PAIN SCALES - GENERAL: PAINLEVEL_OUTOF10: 0

## 2025-09-04 ASSESSMENT — ENCOUNTER SYMPTOMS
RHINORRHEA: 1
COUGH: 1
SORE THROAT: 0

## 2025-09-04 ASSESSMENT — PAIN - FUNCTIONAL ASSESSMENT: PAIN_FUNCTIONAL_ASSESSMENT: 0-10

## 2025-09-04 ASSESSMENT — LIFESTYLE VARIABLES
HOW OFTEN DO YOU HAVE A DRINK CONTAINING ALCOHOL: NEVER
HOW MANY STANDARD DRINKS CONTAINING ALCOHOL DO YOU HAVE ON A TYPICAL DAY: PATIENT DOES NOT DRINK

## (undated) DEVICE — SOLUTION IRRIG 1000ML STRL H2O USP PLAS POUR BTL

## (undated) DEVICE — SINGLE ACTION PUMPING SYSTEM

## (undated) DEVICE — CYSTO: Brand: MEDLINE INDUSTRIES, INC.

## (undated) DEVICE — SOLUTION IRRIG 3000ML 0.9% SOD CHL USP UROMATIC PLAS CONT

## (undated) DEVICE — Device

## (undated) DEVICE — SINGLE-USE DIGITAL FLEXIBLE URETEROSCOPE: Brand: LITHOVUE

## (undated) DEVICE — GUIDEWIRE URO L150CM DIA .035IN STIFF NIT HYDRPHL STR TIP

## (undated) DEVICE — DUAL LUMEN URETERAL CATHETER